# Patient Record
Sex: FEMALE | Race: BLACK OR AFRICAN AMERICAN | Employment: OTHER | URBAN - METROPOLITAN AREA
[De-identification: names, ages, dates, MRNs, and addresses within clinical notes are randomized per-mention and may not be internally consistent; named-entity substitution may affect disease eponyms.]

---

## 2017-03-30 ENCOUNTER — HOSPITAL ENCOUNTER (OUTPATIENT)
Age: 62
Setting detail: OBSERVATION
Discharge: HOME HEALTH | DRG: 202 | End: 2017-04-04
Attending: FAMILY MEDICINE | Admitting: FAMILY MEDICINE
Payer: COMMERCIAL

## 2017-03-30 ENCOUNTER — APPOINTMENT (OUTPATIENT)
Dept: GENERAL RADIOLOGY | Age: 62
DRG: 202 | End: 2017-03-30
Attending: FAMILY MEDICINE
Payer: COMMERCIAL

## 2017-03-30 DIAGNOSIS — J45.901 ASTHMATIC BRONCHITIS WITH ACUTE EXACERBATION: Primary | ICD-10-CM

## 2017-03-30 LAB
ABSOLUTE EOS #: 0.1 K/UL (ref 0–0.4)
ABSOLUTE LYMPH #: 1.5 K/UL (ref 1–4.8)
ABSOLUTE MONO #: 0.5 K/UL (ref 0.1–1.3)
ANION GAP SERPL CALCULATED.3IONS-SCNC: 13 MMOL/L (ref 9–17)
BASOPHILS # BLD: 0 % (ref 0–2)
BASOPHILS ABSOLUTE: 0 K/UL (ref 0–0.2)
BUN BLDV-MCNC: 15 MG/DL (ref 8–23)
BUN/CREAT BLD: ABNORMAL (ref 9–20)
CALCIUM SERPL-MCNC: 8.4 MG/DL (ref 8.6–10.4)
CHLORIDE BLD-SCNC: 102 MMOL/L (ref 98–107)
CO2: 24 MMOL/L (ref 20–31)
CREAT SERPL-MCNC: 0.95 MG/DL (ref 0.5–0.9)
DIFFERENTIAL TYPE: ABNORMAL
EOSINOPHILS RELATIVE PERCENT: 2 % (ref 0–4)
GFR AFRICAN AMERICAN: >60 ML/MIN
GFR NON-AFRICAN AMERICAN: 59 ML/MIN
GFR SERPL CREATININE-BSD FRML MDRD: ABNORMAL ML/MIN/{1.73_M2}
GFR SERPL CREATININE-BSD FRML MDRD: ABNORMAL ML/MIN/{1.73_M2}
GLUCOSE BLD-MCNC: 116 MG/DL (ref 70–99)
HCT VFR BLD CALC: 41 % (ref 36–46)
HEMOGLOBIN: 13 G/DL (ref 12–16)
LYMPHOCYTES # BLD: 28 % (ref 24–44)
MCH RBC QN AUTO: 28.9 PG (ref 26–34)
MCHC RBC AUTO-ENTMCNC: 31.8 G/DL (ref 31–37)
MCV RBC AUTO: 90.7 FL (ref 80–100)
MONOCYTES # BLD: 9 % (ref 1–7)
PDW BLD-RTO: 14.4 % (ref 11.5–14.9)
PLATELET # BLD: 199 K/UL (ref 150–450)
PLATELET ESTIMATE: ABNORMAL
PMV BLD AUTO: 8.1 FL (ref 6–12)
POTASSIUM SERPL-SCNC: 4.6 MMOL/L (ref 3.7–5.3)
RBC # BLD: 4.52 M/UL (ref 4–5.2)
RBC # BLD: ABNORMAL 10*6/UL
SEG NEUTROPHILS: 61 % (ref 36–66)
SEGMENTED NEUTROPHILS ABSOLUTE COUNT: 3.3 K/UL (ref 1.3–9.1)
SODIUM BLD-SCNC: 139 MMOL/L (ref 135–144)
WBC # BLD: 5.3 K/UL (ref 3.5–11)
WBC # BLD: ABNORMAL 10*3/UL

## 2017-03-30 PROCEDURE — 71020 XR CHEST STANDARD TWO VW: CPT

## 2017-03-30 PROCEDURE — 6360000002 HC RX W HCPCS: Performed by: FAMILY MEDICINE

## 2017-03-30 PROCEDURE — 80048 BASIC METABOLIC PNL TOTAL CA: CPT

## 2017-03-30 PROCEDURE — 36415 COLL VENOUS BLD VENIPUNCTURE: CPT

## 2017-03-30 PROCEDURE — 96365 THER/PROPH/DIAG IV INF INIT: CPT

## 2017-03-30 PROCEDURE — 96375 TX/PRO/DX INJ NEW DRUG ADDON: CPT

## 2017-03-30 PROCEDURE — 2580000003 HC RX 258: Performed by: FAMILY MEDICINE

## 2017-03-30 PROCEDURE — 94640 AIRWAY INHALATION TREATMENT: CPT

## 2017-03-30 PROCEDURE — 87040 BLOOD CULTURE FOR BACTERIA: CPT

## 2017-03-30 PROCEDURE — 1200000000 HC SEMI PRIVATE

## 2017-03-30 PROCEDURE — G0378 HOSPITAL OBSERVATION PER HR: HCPCS

## 2017-03-30 PROCEDURE — G0379 DIRECT REFER HOSPITAL OBSERV: HCPCS

## 2017-03-30 PROCEDURE — 85025 COMPLETE CBC W/AUTO DIFF WBC: CPT

## 2017-03-30 RX ORDER — ALBUTEROL SULFATE 2.5 MG/3ML
2.5 SOLUTION RESPIRATORY (INHALATION) 4 TIMES DAILY
Status: DISCONTINUED | OUTPATIENT
Start: 2017-03-30 | End: 2017-04-04 | Stop reason: HOSPADM

## 2017-03-30 RX ORDER — FUROSEMIDE 40 MG/1
40 TABLET ORAL DAILY
Status: DISCONTINUED | OUTPATIENT
Start: 2017-03-31 | End: 2017-04-04 | Stop reason: HOSPADM

## 2017-03-30 RX ORDER — FUROSEMIDE 20 MG/1
40 TABLET ORAL DAILY
COMMUNITY
End: 2019-09-04 | Stop reason: DRUGHIGH

## 2017-03-30 RX ORDER — METHYLPREDNISOLONE SODIUM SUCCINATE 125 MG/2ML
60 INJECTION, POWDER, LYOPHILIZED, FOR SOLUTION INTRAMUSCULAR; INTRAVENOUS EVERY 6 HOURS
Status: DISCONTINUED | OUTPATIENT
Start: 2017-03-30 | End: 2017-04-02

## 2017-03-30 RX ORDER — ALBUTEROL SULFATE 2.5 MG/3ML
2.5 SOLUTION RESPIRATORY (INHALATION) EVERY 6 HOURS PRN
Status: DISCONTINUED | OUTPATIENT
Start: 2017-03-30 | End: 2017-04-04 | Stop reason: HOSPADM

## 2017-03-30 RX ORDER — SERTRALINE HYDROCHLORIDE 25 MG/1
25 TABLET, FILM COATED ORAL NIGHTLY
COMMUNITY
End: 2019-09-04 | Stop reason: DRUGHIGH

## 2017-03-30 RX ORDER — FERROUS SULFATE 325(65) MG
325 TABLET ORAL
COMMUNITY

## 2017-03-30 RX ADMIN — ALBUTEROL SULFATE 2.5 MG: 2.5 SOLUTION RESPIRATORY (INHALATION) at 20:28

## 2017-03-30 RX ADMIN — METHYLPREDNISOLONE SODIUM SUCCINATE 60 MG: 125 INJECTION, POWDER, FOR SOLUTION INTRAMUSCULAR; INTRAVENOUS at 21:40

## 2017-03-30 RX ADMIN — CEFTRIAXONE SODIUM 1 G: 1 INJECTION, POWDER, FOR SOLUTION INTRAMUSCULAR; INTRAVENOUS at 21:40

## 2017-03-31 PROBLEM — M17.0 PRIMARY OSTEOARTHRITIS OF BOTH KNEES: Status: ACTIVE | Noted: 2017-03-31

## 2017-03-31 PROBLEM — J45.901 ASTHMATIC BRONCHITIS WITH ACUTE EXACERBATION: Status: ACTIVE | Noted: 2017-03-31

## 2017-03-31 PROCEDURE — 96366 THER/PROPH/DIAG IV INF ADDON: CPT

## 2017-03-31 PROCEDURE — 2580000003 HC RX 258: Performed by: FAMILY MEDICINE

## 2017-03-31 PROCEDURE — 96376 TX/PRO/DX INJ SAME DRUG ADON: CPT

## 2017-03-31 PROCEDURE — 6370000000 HC RX 637 (ALT 250 FOR IP): Performed by: FAMILY MEDICINE

## 2017-03-31 PROCEDURE — G0378 HOSPITAL OBSERVATION PER HR: HCPCS

## 2017-03-31 PROCEDURE — 94761 N-INVAS EAR/PLS OXIMETRY MLT: CPT

## 2017-03-31 PROCEDURE — 6360000002 HC RX W HCPCS: Performed by: FAMILY MEDICINE

## 2017-03-31 PROCEDURE — 1200000000 HC SEMI PRIVATE

## 2017-03-31 PROCEDURE — 94640 AIRWAY INHALATION TREATMENT: CPT

## 2017-03-31 RX ORDER — DICLOFENAC SODIUM 75 MG/1
75 TABLET, DELAYED RELEASE ORAL 2 TIMES DAILY
Status: DISCONTINUED | OUTPATIENT
Start: 2017-03-31 | End: 2017-03-31

## 2017-03-31 RX ORDER — AZITHROMYCIN 250 MG/1
500 TABLET, FILM COATED ORAL ONCE
Status: COMPLETED | OUTPATIENT
Start: 2017-03-31 | End: 2017-03-31

## 2017-03-31 RX ORDER — AZITHROMYCIN 250 MG/1
250 TABLET, FILM COATED ORAL DAILY
Status: COMPLETED | OUTPATIENT
Start: 2017-04-01 | End: 2017-04-04

## 2017-03-31 RX ORDER — MISOPROSTOL 200 UG/1
200 TABLET ORAL DAILY
Status: DISCONTINUED | OUTPATIENT
Start: 2017-03-31 | End: 2017-04-04 | Stop reason: HOSPADM

## 2017-03-31 RX ORDER — BENZONATATE 100 MG/1
200 CAPSULE ORAL 3 TIMES DAILY PRN
Status: DISCONTINUED | OUTPATIENT
Start: 2017-03-31 | End: 2017-04-04 | Stop reason: HOSPADM

## 2017-03-31 RX ORDER — IBUPROFEN 600 MG/1
600 TABLET ORAL
Status: DISCONTINUED | OUTPATIENT
Start: 2017-03-31 | End: 2017-04-04 | Stop reason: HOSPADM

## 2017-03-31 RX ADMIN — ALBUTEROL SULFATE 2.5 MG: 2.5 SOLUTION RESPIRATORY (INHALATION) at 15:30

## 2017-03-31 RX ADMIN — ALBUTEROL SULFATE 2.5 MG: 2.5 SOLUTION RESPIRATORY (INHALATION) at 11:48

## 2017-03-31 RX ADMIN — IBUPROFEN 600 MG: 600 TABLET, FILM COATED ORAL at 10:03

## 2017-03-31 RX ADMIN — METHYLPREDNISOLONE SODIUM SUCCINATE 60 MG: 125 INJECTION, POWDER, FOR SOLUTION INTRAMUSCULAR; INTRAVENOUS at 10:03

## 2017-03-31 RX ADMIN — ALBUTEROL SULFATE 2.5 MG: 2.5 SOLUTION RESPIRATORY (INHALATION) at 07:42

## 2017-03-31 RX ADMIN — BENZONATATE 200 MG: 100 CAPSULE ORAL at 19:58

## 2017-03-31 RX ADMIN — BENZONATATE 200 MG: 100 CAPSULE ORAL at 10:03

## 2017-03-31 RX ADMIN — MISOPROSTOL 200 MCG: 200 TABLET ORAL at 10:03

## 2017-03-31 RX ADMIN — SERTRALINE HYDROCHLORIDE 25 MG: 50 TABLET ORAL at 19:58

## 2017-03-31 RX ADMIN — METHYLPREDNISOLONE SODIUM SUCCINATE 60 MG: 125 INJECTION, POWDER, FOR SOLUTION INTRAMUSCULAR; INTRAVENOUS at 16:27

## 2017-03-31 RX ADMIN — AZITHROMYCIN 500 MG: 250 TABLET, FILM COATED ORAL at 10:03

## 2017-03-31 RX ADMIN — METHYLPREDNISOLONE SODIUM SUCCINATE 60 MG: 125 INJECTION, POWDER, FOR SOLUTION INTRAMUSCULAR; INTRAVENOUS at 04:13

## 2017-03-31 RX ADMIN — IBUPROFEN 600 MG: 600 TABLET, FILM COATED ORAL at 16:28

## 2017-03-31 RX ADMIN — METHYLPREDNISOLONE SODIUM SUCCINATE 60 MG: 125 INJECTION, POWDER, FOR SOLUTION INTRAMUSCULAR; INTRAVENOUS at 22:57

## 2017-03-31 RX ADMIN — CEFTRIAXONE SODIUM 1 G: 1 INJECTION, POWDER, FOR SOLUTION INTRAMUSCULAR; INTRAVENOUS at 17:11

## 2017-03-31 RX ADMIN — SERTRALINE HYDROCHLORIDE 25 MG: 50 TABLET ORAL at 00:02

## 2017-03-31 RX ADMIN — FUROSEMIDE 40 MG: 40 TABLET ORAL at 10:03

## 2017-03-31 RX ADMIN — ALBUTEROL SULFATE 2.5 MG: 2.5 SOLUTION RESPIRATORY (INHALATION) at 19:22

## 2017-03-31 ASSESSMENT — PAIN SCALES - GENERAL
PAINLEVEL_OUTOF10: 0
PAINLEVEL_OUTOF10: 0

## 2017-04-01 PROBLEM — G44.83 PRIMARY COUGH HEADACHE: Status: ACTIVE | Noted: 2017-04-01

## 2017-04-01 LAB — GLUCOSE BLD-MCNC: 156 MG/DL (ref 65–105)

## 2017-04-01 PROCEDURE — 94760 N-INVAS EAR/PLS OXIMETRY 1: CPT

## 2017-04-01 PROCEDURE — G0378 HOSPITAL OBSERVATION PER HR: HCPCS

## 2017-04-01 PROCEDURE — 82947 ASSAY GLUCOSE BLOOD QUANT: CPT

## 2017-04-01 PROCEDURE — 2580000003 HC RX 258: Performed by: FAMILY MEDICINE

## 2017-04-01 PROCEDURE — 6360000002 HC RX W HCPCS: Performed by: FAMILY MEDICINE

## 2017-04-01 PROCEDURE — 1200000000 HC SEMI PRIVATE

## 2017-04-01 PROCEDURE — 94640 AIRWAY INHALATION TREATMENT: CPT

## 2017-04-01 PROCEDURE — 96376 TX/PRO/DX INJ SAME DRUG ADON: CPT

## 2017-04-01 PROCEDURE — 96366 THER/PROPH/DIAG IV INF ADDON: CPT

## 2017-04-01 PROCEDURE — 6370000000 HC RX 637 (ALT 250 FOR IP): Performed by: FAMILY MEDICINE

## 2017-04-01 PROCEDURE — 94761 N-INVAS EAR/PLS OXIMETRY MLT: CPT

## 2017-04-01 RX ORDER — ACETAMINOPHEN 325 MG/1
325 TABLET ORAL EVERY 4 HOURS PRN
Status: DISCONTINUED | OUTPATIENT
Start: 2017-04-01 | End: 2017-04-04 | Stop reason: HOSPADM

## 2017-04-01 RX ORDER — ACETAMINOPHEN 325 MG/1
325 TABLET ORAL EVERY 6 HOURS PRN
Status: DISCONTINUED | OUTPATIENT
Start: 2017-04-01 | End: 2017-04-01

## 2017-04-01 RX ORDER — HYDROCODONE BITARTRATE AND ACETAMINOPHEN 5; 325 MG/1; MG/1
1 TABLET ORAL EVERY 4 HOURS PRN
Status: DISCONTINUED | OUTPATIENT
Start: 2017-04-01 | End: 2017-04-04 | Stop reason: HOSPADM

## 2017-04-01 RX ORDER — ACETAMINOPHEN 325 MG/1
650 TABLET ORAL EVERY 6 HOURS PRN
Status: DISCONTINUED | OUTPATIENT
Start: 2017-04-01 | End: 2017-04-01

## 2017-04-01 RX ADMIN — METHYLPREDNISOLONE SODIUM SUCCINATE 60 MG: 125 INJECTION, POWDER, FOR SOLUTION INTRAMUSCULAR; INTRAVENOUS at 04:59

## 2017-04-01 RX ADMIN — IBUPROFEN 600 MG: 600 TABLET, FILM COATED ORAL at 10:30

## 2017-04-01 RX ADMIN — MISOPROSTOL 200 MCG: 200 TABLET ORAL at 10:31

## 2017-04-01 RX ADMIN — IBUPROFEN 600 MG: 600 TABLET, FILM COATED ORAL at 18:43

## 2017-04-01 RX ADMIN — ACETAMINOPHEN 650 MG: 325 TABLET ORAL at 02:27

## 2017-04-01 RX ADMIN — ALBUTEROL SULFATE 2.5 MG: 2.5 SOLUTION RESPIRATORY (INHALATION) at 20:54

## 2017-04-01 RX ADMIN — IBUPROFEN 600 MG: 600 TABLET, FILM COATED ORAL at 14:46

## 2017-04-01 RX ADMIN — ALBUTEROL SULFATE 2.5 MG: 2.5 SOLUTION RESPIRATORY (INHALATION) at 02:41

## 2017-04-01 RX ADMIN — METHYLPREDNISOLONE SODIUM SUCCINATE 60 MG: 125 INJECTION, POWDER, FOR SOLUTION INTRAMUSCULAR; INTRAVENOUS at 21:13

## 2017-04-01 RX ADMIN — CEFTRIAXONE SODIUM 1 G: 1 INJECTION, POWDER, FOR SOLUTION INTRAMUSCULAR; INTRAVENOUS at 19:35

## 2017-04-01 RX ADMIN — AZITHROMYCIN 250 MG: 250 TABLET, FILM COATED ORAL at 10:30

## 2017-04-01 RX ADMIN — METHYLPREDNISOLONE SODIUM SUCCINATE 60 MG: 125 INJECTION, POWDER, FOR SOLUTION INTRAMUSCULAR; INTRAVENOUS at 10:30

## 2017-04-01 RX ADMIN — ALBUTEROL SULFATE 2.5 MG: 2.5 SOLUTION RESPIRATORY (INHALATION) at 07:41

## 2017-04-01 RX ADMIN — METHYLPREDNISOLONE SODIUM SUCCINATE 60 MG: 125 INJECTION, POWDER, FOR SOLUTION INTRAMUSCULAR; INTRAVENOUS at 18:55

## 2017-04-01 RX ADMIN — ALBUTEROL SULFATE 2.5 MG: 2.5 SOLUTION RESPIRATORY (INHALATION) at 11:42

## 2017-04-01 RX ADMIN — FUROSEMIDE 40 MG: 40 TABLET ORAL at 10:31

## 2017-04-01 RX ADMIN — BENZONATATE 200 MG: 100 CAPSULE ORAL at 10:31

## 2017-04-01 RX ADMIN — SERTRALINE HYDROCHLORIDE 25 MG: 50 TABLET ORAL at 21:13

## 2017-04-01 RX ADMIN — ALBUTEROL SULFATE 2.5 MG: 2.5 SOLUTION RESPIRATORY (INHALATION) at 16:01

## 2017-04-01 ASSESSMENT — PAIN SCALES - GENERAL
PAINLEVEL_OUTOF10: 5
PAINLEVEL_OUTOF10: 0
PAINLEVEL_OUTOF10: 5
PAINLEVEL_OUTOF10: 0
PAINLEVEL_OUTOF10: 6
PAINLEVEL_OUTOF10: 6

## 2017-04-02 PROCEDURE — 94761 N-INVAS EAR/PLS OXIMETRY MLT: CPT

## 2017-04-02 PROCEDURE — 96366 THER/PROPH/DIAG IV INF ADDON: CPT

## 2017-04-02 PROCEDURE — 6360000002 HC RX W HCPCS: Performed by: FAMILY MEDICINE

## 2017-04-02 PROCEDURE — 96376 TX/PRO/DX INJ SAME DRUG ADON: CPT

## 2017-04-02 PROCEDURE — 6370000000 HC RX 637 (ALT 250 FOR IP): Performed by: FAMILY MEDICINE

## 2017-04-02 PROCEDURE — 1200000000 HC SEMI PRIVATE

## 2017-04-02 PROCEDURE — G0378 HOSPITAL OBSERVATION PER HR: HCPCS

## 2017-04-02 PROCEDURE — 94664 DEMO&/EVAL PT USE INHALER: CPT

## 2017-04-02 PROCEDURE — 2580000003 HC RX 258: Performed by: FAMILY MEDICINE

## 2017-04-02 PROCEDURE — 94640 AIRWAY INHALATION TREATMENT: CPT

## 2017-04-02 RX ORDER — METHYLPREDNISOLONE SODIUM SUCCINATE 40 MG/ML
40 INJECTION, POWDER, LYOPHILIZED, FOR SOLUTION INTRAMUSCULAR; INTRAVENOUS EVERY 8 HOURS
Status: DISCONTINUED | OUTPATIENT
Start: 2017-04-02 | End: 2017-04-03

## 2017-04-02 RX ORDER — METHYLPREDNISOLONE SODIUM SUCCINATE 125 MG/2ML
INJECTION, POWDER, LYOPHILIZED, FOR SOLUTION INTRAMUSCULAR; INTRAVENOUS
Status: DISPENSED
Start: 2017-04-02 | End: 2017-04-02

## 2017-04-02 RX ADMIN — METHYLPREDNISOLONE SODIUM SUCCINATE 60 MG: 125 INJECTION, POWDER, FOR SOLUTION INTRAMUSCULAR; INTRAVENOUS at 04:13

## 2017-04-02 RX ADMIN — IBUPROFEN 600 MG: 600 TABLET, FILM COATED ORAL at 17:46

## 2017-04-02 RX ADMIN — ALBUTEROL SULFATE 2.5 MG: 2.5 SOLUTION RESPIRATORY (INHALATION) at 19:14

## 2017-04-02 RX ADMIN — IBUPROFEN 600 MG: 600 TABLET, FILM COATED ORAL at 09:24

## 2017-04-02 RX ADMIN — METHYLPREDNISOLONE SODIUM SUCCINATE 40 MG: 40 INJECTION, POWDER, FOR SOLUTION INTRAMUSCULAR; INTRAVENOUS at 17:47

## 2017-04-02 RX ADMIN — ALBUTEROL SULFATE 2.5 MG: 2.5 SOLUTION RESPIRATORY (INHALATION) at 15:41

## 2017-04-02 RX ADMIN — FUROSEMIDE 40 MG: 40 TABLET ORAL at 09:24

## 2017-04-02 RX ADMIN — SERTRALINE HYDROCHLORIDE 25 MG: 50 TABLET ORAL at 20:35

## 2017-04-02 RX ADMIN — IBUPROFEN 600 MG: 600 TABLET, FILM COATED ORAL at 11:33

## 2017-04-02 RX ADMIN — MISOPROSTOL 200 MCG: 200 TABLET ORAL at 09:26

## 2017-04-02 RX ADMIN — ALBUTEROL SULFATE 2.5 MG: 2.5 SOLUTION RESPIRATORY (INHALATION) at 11:07

## 2017-04-02 RX ADMIN — BECLOMETHASONE DIPROPIONATE 2 PUFF: 80 AEROSOL, METERED RESPIRATORY (INHALATION) at 11:33

## 2017-04-02 RX ADMIN — BECLOMETHASONE DIPROPIONATE 2 PUFF: 80 AEROSOL, METERED RESPIRATORY (INHALATION) at 20:35

## 2017-04-02 RX ADMIN — METHYLPREDNISOLONE SODIUM SUCCINATE 60 MG: 125 INJECTION, POWDER, FOR SOLUTION INTRAMUSCULAR; INTRAVENOUS at 09:29

## 2017-04-02 RX ADMIN — AZITHROMYCIN 250 MG: 250 TABLET, FILM COATED ORAL at 09:24

## 2017-04-02 RX ADMIN — ALBUTEROL SULFATE 2.5 MG: 2.5 SOLUTION RESPIRATORY (INHALATION) at 07:26

## 2017-04-02 RX ADMIN — CEFTRIAXONE SODIUM 1 G: 1 INJECTION, POWDER, FOR SOLUTION INTRAMUSCULAR; INTRAVENOUS at 17:45

## 2017-04-02 ASSESSMENT — PAIN DESCRIPTION - PAIN TYPE
TYPE: CHRONIC PAIN

## 2017-04-02 ASSESSMENT — PAIN SCALES - GENERAL
PAINLEVEL_OUTOF10: 3
PAINLEVEL_OUTOF10: 5
PAINLEVEL_OUTOF10: 5
PAINLEVEL_OUTOF10: 2
PAINLEVEL_OUTOF10: 5
PAINLEVEL_OUTOF10: 3

## 2017-04-02 ASSESSMENT — PAIN DESCRIPTION - LOCATION
LOCATION: GENERALIZED

## 2017-04-03 LAB
LV EF: 65 %
LVEF MODALITY: NORMAL

## 2017-04-03 PROCEDURE — 94762 N-INVAS EAR/PLS OXIMTRY CONT: CPT

## 2017-04-03 PROCEDURE — 94760 N-INVAS EAR/PLS OXIMETRY 1: CPT

## 2017-04-03 PROCEDURE — 6360000004 HC RX CONTRAST MEDICATION: Performed by: INTERNAL MEDICINE

## 2017-04-03 PROCEDURE — 6360000002 HC RX W HCPCS: Performed by: FAMILY MEDICINE

## 2017-04-03 PROCEDURE — 6360000002 HC RX W HCPCS: Performed by: INTERNAL MEDICINE

## 2017-04-03 PROCEDURE — 94640 AIRWAY INHALATION TREATMENT: CPT

## 2017-04-03 PROCEDURE — 93306 TTE W/DOPPLER COMPLETE: CPT

## 2017-04-03 PROCEDURE — 2580000003 HC RX 258: Performed by: FAMILY MEDICINE

## 2017-04-03 PROCEDURE — 1200000000 HC SEMI PRIVATE

## 2017-04-03 PROCEDURE — 6370000000 HC RX 637 (ALT 250 FOR IP): Performed by: FAMILY MEDICINE

## 2017-04-03 PROCEDURE — G0378 HOSPITAL OBSERVATION PER HR: HCPCS

## 2017-04-03 PROCEDURE — 94761 N-INVAS EAR/PLS OXIMETRY MLT: CPT

## 2017-04-03 PROCEDURE — 96366 THER/PROPH/DIAG IV INF ADDON: CPT

## 2017-04-03 PROCEDURE — 96376 TX/PRO/DX INJ SAME DRUG ADON: CPT

## 2017-04-03 RX ORDER — METHYLPREDNISOLONE SODIUM SUCCINATE 40 MG/ML
30 INJECTION, POWDER, LYOPHILIZED, FOR SOLUTION INTRAMUSCULAR; INTRAVENOUS EVERY 8 HOURS
Status: DISCONTINUED | OUTPATIENT
Start: 2017-04-03 | End: 2017-04-04

## 2017-04-03 RX ADMIN — ALBUTEROL SULFATE 2.5 MG: 2.5 SOLUTION RESPIRATORY (INHALATION) at 01:37

## 2017-04-03 RX ADMIN — METHYLPREDNISOLONE SODIUM SUCCINATE 40 MG: 40 INJECTION, POWDER, FOR SOLUTION INTRAMUSCULAR; INTRAVENOUS at 08:08

## 2017-04-03 RX ADMIN — FUROSEMIDE 40 MG: 40 TABLET ORAL at 08:04

## 2017-04-03 RX ADMIN — ALBUTEROL SULFATE 2.5 MG: 2.5 SOLUTION RESPIRATORY (INHALATION) at 12:05

## 2017-04-03 RX ADMIN — SERTRALINE HYDROCHLORIDE 25 MG: 50 TABLET ORAL at 22:46

## 2017-04-03 RX ADMIN — IBUPROFEN 600 MG: 600 TABLET, FILM COATED ORAL at 08:05

## 2017-04-03 RX ADMIN — BECLOMETHASONE DIPROPIONATE 2 PUFF: 80 AEROSOL, METERED RESPIRATORY (INHALATION) at 08:05

## 2017-04-03 RX ADMIN — MISOPROSTOL 200 MCG: 200 TABLET ORAL at 08:08

## 2017-04-03 RX ADMIN — METHYLPREDNISOLONE SODIUM SUCCINATE 30 MG: 40 INJECTION, POWDER, FOR SOLUTION INTRAMUSCULAR; INTRAVENOUS at 18:19

## 2017-04-03 RX ADMIN — BECLOMETHASONE DIPROPIONATE 2 PUFF: 80 AEROSOL, METERED RESPIRATORY (INHALATION) at 22:54

## 2017-04-03 RX ADMIN — CEFTRIAXONE SODIUM 1 G: 1 INJECTION, POWDER, FOR SOLUTION INTRAMUSCULAR; INTRAVENOUS at 18:19

## 2017-04-03 RX ADMIN — IBUPROFEN 600 MG: 600 TABLET, FILM COATED ORAL at 17:34

## 2017-04-03 RX ADMIN — ALBUTEROL SULFATE 2.5 MG: 2.5 SOLUTION RESPIRATORY (INHALATION) at 15:14

## 2017-04-03 RX ADMIN — ALBUTEROL SULFATE 2.5 MG: 2.5 SOLUTION RESPIRATORY (INHALATION) at 20:31

## 2017-04-03 RX ADMIN — ALBUTEROL SULFATE 2.5 MG: 2.5 SOLUTION RESPIRATORY (INHALATION) at 08:25

## 2017-04-03 RX ADMIN — AZITHROMYCIN 250 MG: 250 TABLET, FILM COATED ORAL at 08:05

## 2017-04-03 RX ADMIN — PERFLUTREN 2.2 MG: 6.52 INJECTION, SUSPENSION INTRAVENOUS at 13:41

## 2017-04-03 RX ADMIN — IBUPROFEN 600 MG: 600 TABLET, FILM COATED ORAL at 13:46

## 2017-04-03 RX ADMIN — METHYLPREDNISOLONE SODIUM SUCCINATE 40 MG: 40 INJECTION, POWDER, FOR SOLUTION INTRAMUSCULAR; INTRAVENOUS at 01:21

## 2017-04-03 ASSESSMENT — PAIN SCALES - GENERAL
PAINLEVEL_OUTOF10: 0
PAINLEVEL_OUTOF10: 5
PAINLEVEL_OUTOF10: 5
PAINLEVEL_OUTOF10: 3

## 2017-04-04 VITALS
TEMPERATURE: 97.2 F | WEIGHT: 293 LBS | RESPIRATION RATE: 16 BRPM | HEART RATE: 81 BPM | BODY MASS INDEX: 47.09 KG/M2 | HEIGHT: 66 IN | SYSTOLIC BLOOD PRESSURE: 129 MMHG | OXYGEN SATURATION: 96 % | DIASTOLIC BLOOD PRESSURE: 69 MMHG

## 2017-04-04 PROCEDURE — 6360000002 HC RX W HCPCS: Performed by: FAMILY MEDICINE

## 2017-04-04 PROCEDURE — 94761 N-INVAS EAR/PLS OXIMETRY MLT: CPT

## 2017-04-04 PROCEDURE — 94762 N-INVAS EAR/PLS OXIMTRY CONT: CPT

## 2017-04-04 PROCEDURE — G0378 HOSPITAL OBSERVATION PER HR: HCPCS

## 2017-04-04 PROCEDURE — 94640 AIRWAY INHALATION TREATMENT: CPT

## 2017-04-04 PROCEDURE — 6370000000 HC RX 637 (ALT 250 FOR IP): Performed by: FAMILY MEDICINE

## 2017-04-04 PROCEDURE — 6360000002 HC RX W HCPCS: Performed by: INTERNAL MEDICINE

## 2017-04-04 PROCEDURE — 96376 TX/PRO/DX INJ SAME DRUG ADON: CPT

## 2017-04-04 RX ORDER — CEFUROXIME AXETIL 250 MG/1
500 TABLET ORAL EVERY 12 HOURS SCHEDULED
Status: DISCONTINUED | OUTPATIENT
Start: 2017-04-04 | End: 2017-04-04 | Stop reason: HOSPADM

## 2017-04-04 RX ORDER — AMLODIPINE BESYLATE AND BENAZEPRIL HYDROCHLORIDE 10; 20 MG/1; MG/1
1 CAPSULE ORAL DAILY
Qty: 30 CAPSULE | Refills: 3 | Status: SHIPPED | OUTPATIENT
Start: 2017-04-04 | End: 2017-05-01

## 2017-04-04 RX ORDER — BENZONATATE 200 MG/1
200 CAPSULE ORAL 3 TIMES DAILY PRN
Qty: 30 CAPSULE | Refills: 0 | Status: SHIPPED | OUTPATIENT
Start: 2017-04-04 | End: 2017-04-11

## 2017-04-04 RX ORDER — ACETAMINOPHEN 325 MG/1
325 TABLET ORAL EVERY 4 HOURS PRN
Qty: 120 TABLET | Refills: 3 | Status: SHIPPED | OUTPATIENT
Start: 2017-04-04 | End: 2017-05-01

## 2017-04-04 RX ORDER — ALBUTEROL SULFATE 2.5 MG/3ML
2.5 SOLUTION RESPIRATORY (INHALATION) 4 TIMES DAILY
Qty: 120 EACH | Refills: 3 | Status: ON HOLD | OUTPATIENT
Start: 2017-04-04 | End: 2019-05-25 | Stop reason: HOSPADM

## 2017-04-04 RX ORDER — PREDNISONE 1 MG/1
15 TABLET ORAL DAILY
Qty: 9 TABLET | Refills: 0 | Status: SHIPPED | OUTPATIENT
Start: 2017-04-07 | End: 2017-04-10

## 2017-04-04 RX ORDER — PREDNISONE 20 MG/1
20 TABLET ORAL DAILY
Qty: 2 TABLET | Refills: 0 | Status: SHIPPED | OUTPATIENT
Start: 2017-04-04 | End: 2017-04-06

## 2017-04-04 RX ORDER — PREDNISONE 10 MG/1
10 TABLET ORAL DAILY
Qty: 7 TABLET | Refills: 0 | Status: SHIPPED | OUTPATIENT
Start: 2017-04-11 | End: 2017-04-18

## 2017-04-04 RX ORDER — PREDNISONE 10 MG/1
10 TABLET ORAL DAILY
Status: DISCONTINUED | OUTPATIENT
Start: 2017-04-10 | End: 2017-04-04 | Stop reason: HOSPADM

## 2017-04-04 RX ORDER — PREDNISONE 10 MG/1
5 TABLET ORAL DAILY
Status: DISCONTINUED | OUTPATIENT
Start: 2017-04-13 | End: 2017-04-04 | Stop reason: HOSPADM

## 2017-04-04 RX ORDER — PREDNISONE 10 MG/1
15 TABLET ORAL DAILY
Status: DISCONTINUED | OUTPATIENT
Start: 2017-04-07 | End: 2017-04-04 | Stop reason: HOSPADM

## 2017-04-04 RX ORDER — PREDNISONE 20 MG/1
20 TABLET ORAL DAILY
Status: DISCONTINUED | OUTPATIENT
Start: 2017-04-04 | End: 2017-04-04 | Stop reason: HOSPADM

## 2017-04-04 RX ORDER — CEFUROXIME AXETIL 500 MG/1
500 TABLET ORAL EVERY 12 HOURS SCHEDULED
Qty: 8 TABLET | Refills: 0 | Status: SHIPPED | OUTPATIENT
Start: 2017-04-04 | End: 2017-04-08

## 2017-04-04 RX ADMIN — FUROSEMIDE 40 MG: 40 TABLET ORAL at 08:44

## 2017-04-04 RX ADMIN — BENZONATATE 200 MG: 100 CAPSULE ORAL at 08:44

## 2017-04-04 RX ADMIN — AZITHROMYCIN 250 MG: 250 TABLET, FILM COATED ORAL at 08:44

## 2017-04-04 RX ADMIN — METHYLPREDNISOLONE SODIUM SUCCINATE 30 MG: 40 INJECTION, POWDER, FOR SOLUTION INTRAMUSCULAR; INTRAVENOUS at 08:44

## 2017-04-04 RX ADMIN — ALBUTEROL SULFATE 2.5 MG: 2.5 SOLUTION RESPIRATORY (INHALATION) at 07:34

## 2017-04-04 RX ADMIN — METHYLPREDNISOLONE SODIUM SUCCINATE 30 MG: 40 INJECTION, POWDER, FOR SOLUTION INTRAMUSCULAR; INTRAVENOUS at 01:43

## 2017-04-04 RX ADMIN — IBUPROFEN 600 MG: 600 TABLET, FILM COATED ORAL at 08:44

## 2017-04-04 RX ADMIN — ALBUTEROL SULFATE 2.5 MG: 2.5 SOLUTION RESPIRATORY (INHALATION) at 12:09

## 2017-04-04 RX ADMIN — ALBUTEROL SULFATE 2.5 MG: 2.5 SOLUTION RESPIRATORY (INHALATION) at 15:03

## 2017-04-04 RX ADMIN — BECLOMETHASONE DIPROPIONATE 2 PUFF: 80 AEROSOL, METERED RESPIRATORY (INHALATION) at 08:44

## 2017-04-04 ASSESSMENT — PAIN SCALES - GENERAL
PAINLEVEL_OUTOF10: 4
PAINLEVEL_OUTOF10: 0

## 2017-04-05 LAB
CULTURE: NORMAL
CULTURE: NORMAL
Lab: NORMAL
Lab: NORMAL
SPECIMEN DESCRIPTION: NORMAL
SPECIMEN DESCRIPTION: NORMAL
STATUS: NORMAL

## 2017-05-01 ENCOUNTER — APPOINTMENT (OUTPATIENT)
Dept: CT IMAGING | Age: 62
DRG: 389 | End: 2017-05-01
Payer: COMMERCIAL

## 2017-05-01 ENCOUNTER — APPOINTMENT (OUTPATIENT)
Dept: GENERAL RADIOLOGY | Age: 62
DRG: 394 | End: 2017-05-01
Payer: COMMERCIAL

## 2017-05-01 ENCOUNTER — HOSPITAL ENCOUNTER (EMERGENCY)
Age: 62
Discharge: ANOTHER HOSPITAL | DRG: 389 | End: 2017-05-01
Attending: EMERGENCY MEDICINE | Admitting: EMERGENCY MEDICINE
Payer: COMMERCIAL

## 2017-05-01 ENCOUNTER — HOSPITAL ENCOUNTER (INPATIENT)
Age: 62
LOS: 4 days | Discharge: HOME | DRG: 394 | End: 2017-05-05
Attending: EMERGENCY MEDICINE | Admitting: FAMILY MEDICINE
Payer: COMMERCIAL

## 2017-05-01 VITALS
HEIGHT: 65 IN | BODY MASS INDEX: 48.82 KG/M2 | DIASTOLIC BLOOD PRESSURE: 56 MMHG | SYSTOLIC BLOOD PRESSURE: 117 MMHG | TEMPERATURE: 98.2 F | OXYGEN SATURATION: 99 % | WEIGHT: 293 LBS | HEART RATE: 71 BPM | RESPIRATION RATE: 16 BRPM

## 2017-05-01 DIAGNOSIS — R11.0 NAUSEA: ICD-10-CM

## 2017-05-01 DIAGNOSIS — R10.9 ABDOMINAL PAIN, UNSPECIFIED LOCATION: ICD-10-CM

## 2017-05-01 DIAGNOSIS — K56.609 SBO (SMALL BOWEL OBSTRUCTION) (HCC): Primary | ICD-10-CM

## 2017-05-01 PROBLEM — E66.01 MORBID OBESITY DUE TO EXCESS CALORIES (HCC): Status: ACTIVE | Noted: 2017-05-01

## 2017-05-01 PROBLEM — K43.9 VENTRAL HERNIA: Status: ACTIVE | Noted: 2017-05-01

## 2017-05-01 LAB
ABSOLUTE EOS #: 0 K/UL (ref 0–0.4)
ABSOLUTE LYMPH #: 1 K/UL (ref 1–4.8)
ABSOLUTE MONO #: 0.4 K/UL (ref 0.1–1.3)
ALBUMIN SERPL-MCNC: 3.7 G/DL (ref 3.5–5.2)
ALBUMIN/GLOBULIN RATIO: ABNORMAL (ref 1–2.5)
ALP BLD-CCNC: 336 U/L (ref 35–104)
ALT SERPL-CCNC: 24 U/L (ref 5–33)
ANION GAP SERPL CALCULATED.3IONS-SCNC: 13 MMOL/L (ref 9–17)
AST SERPL-CCNC: 28 U/L
BASOPHILS # BLD: 1 %
BASOPHILS ABSOLUTE: 0 K/UL (ref 0–0.2)
BILIRUB SERPL-MCNC: 0.87 MG/DL (ref 0.3–1.2)
BILIRUBIN URINE: NEGATIVE
BUN BLDV-MCNC: 27 MG/DL (ref 8–23)
BUN/CREAT BLD: ABNORMAL (ref 9–20)
CALCIUM SERPL-MCNC: 8.2 MG/DL (ref 8.6–10.4)
CHLORIDE BLD-SCNC: 104 MMOL/L (ref 98–107)
CO2: 23 MMOL/L (ref 20–31)
COLOR: YELLOW
COMMENT UA: NORMAL
CREAT SERPL-MCNC: 1.01 MG/DL (ref 0.5–0.9)
DIFFERENTIAL TYPE: ABNORMAL
EOSINOPHILS RELATIVE PERCENT: 0 %
GFR AFRICAN AMERICAN: >60 ML/MIN
GFR NON-AFRICAN AMERICAN: 55 ML/MIN
GFR SERPL CREATININE-BSD FRML MDRD: ABNORMAL ML/MIN/{1.73_M2}
GFR SERPL CREATININE-BSD FRML MDRD: ABNORMAL ML/MIN/{1.73_M2}
GLUCOSE BLD-MCNC: 115 MG/DL (ref 70–99)
GLUCOSE URINE: NEGATIVE
HCT VFR BLD CALC: 39.6 % (ref 36–46)
HEMOGLOBIN: 13 G/DL (ref 12–16)
INR BLD: 0.9
KETONES, URINE: NEGATIVE
LACTIC ACID, WHOLE BLOOD: 0.7 MMOL/L (ref 0.7–2.1)
LEUKOCYTE ESTERASE, URINE: NEGATIVE
LIPASE: 17 U/L (ref 13–60)
LYMPHOCYTES # BLD: 14 %
MCH RBC QN AUTO: 29.6 PG (ref 26–34)
MCHC RBC AUTO-ENTMCNC: 32.8 G/DL (ref 31–37)
MCV RBC AUTO: 90.2 FL (ref 80–100)
MONOCYTES # BLD: 6 %
NITRITE, URINE: NEGATIVE
PDW BLD-RTO: 15.5 % (ref 11.5–14.9)
PH UA: 8 (ref 5–8)
PLATELET # BLD: 200 K/UL (ref 150–450)
PLATELET ESTIMATE: ABNORMAL
PMV BLD AUTO: 7.9 FL (ref 6–12)
POTASSIUM SERPL-SCNC: 4.2 MMOL/L (ref 3.7–5.3)
PROTEIN UA: NEGATIVE
PROTHROMBIN TIME: 10.2 SEC (ref 9.4–12.6)
RBC # BLD: 4.39 M/UL (ref 4–5.2)
RBC # BLD: ABNORMAL 10*6/UL
SEG NEUTROPHILS: 79 %
SEGMENTED NEUTROPHILS ABSOLUTE COUNT: 5.8 K/UL (ref 1.3–9.1)
SODIUM BLD-SCNC: 140 MMOL/L (ref 135–144)
SPECIFIC GRAVITY UA: 1.02 (ref 1–1.03)
TOTAL PROTEIN: 6.8 G/DL (ref 6.4–8.3)
TURBIDITY: CLEAR
URINE HGB: NEGATIVE
UROBILINOGEN, URINE: NORMAL
WBC # BLD: 7.3 K/UL (ref 3.5–11)
WBC # BLD: ABNORMAL 10*3/UL

## 2017-05-01 PROCEDURE — 83605 ASSAY OF LACTIC ACID: CPT

## 2017-05-01 PROCEDURE — 96375 TX/PRO/DX INJ NEW DRUG ADDON: CPT

## 2017-05-01 PROCEDURE — 81003 URINALYSIS AUTO W/O SCOPE: CPT

## 2017-05-01 PROCEDURE — 1200000000 HC SEMI PRIVATE

## 2017-05-01 PROCEDURE — 83690 ASSAY OF LIPASE: CPT

## 2017-05-01 PROCEDURE — 2580000003 HC RX 258: Performed by: EMERGENCY MEDICINE

## 2017-05-01 PROCEDURE — 96376 TX/PRO/DX INJ SAME DRUG ADON: CPT

## 2017-05-01 PROCEDURE — 99285 EMERGENCY DEPT VISIT HI MDM: CPT

## 2017-05-01 PROCEDURE — 74000 XR ABDOMEN LIMITED (KUB): CPT

## 2017-05-01 PROCEDURE — 36415 COLL VENOUS BLD VENIPUNCTURE: CPT

## 2017-05-01 PROCEDURE — 74176 CT ABD & PELVIS W/O CONTRAST: CPT

## 2017-05-01 PROCEDURE — 85025 COMPLETE CBC W/AUTO DIFF WBC: CPT

## 2017-05-01 PROCEDURE — 96374 THER/PROPH/DIAG INJ IV PUSH: CPT

## 2017-05-01 PROCEDURE — 2580000003 HC RX 258: Performed by: NURSE PRACTITIONER

## 2017-05-01 PROCEDURE — 80053 COMPREHEN METABOLIC PANEL: CPT

## 2017-05-01 PROCEDURE — 6360000002 HC RX W HCPCS: Performed by: EMERGENCY MEDICINE

## 2017-05-01 PROCEDURE — 85610 PROTHROMBIN TIME: CPT

## 2017-05-01 RX ORDER — SODIUM CHLORIDE 0.9 % (FLUSH) 0.9 %
10 SYRINGE (ML) INJECTION EVERY 12 HOURS SCHEDULED
Status: CANCELLED | OUTPATIENT
Start: 2017-05-01

## 2017-05-01 RX ORDER — LANOLIN ALCOHOL/MO/W.PET/CERES
325 CREAM (GRAM) TOPICAL
Status: DISCONTINUED | OUTPATIENT
Start: 2017-05-02 | End: 2017-05-05 | Stop reason: HOSPADM

## 2017-05-01 RX ORDER — ONDANSETRON 2 MG/ML
4 INJECTION INTRAMUSCULAR; INTRAVENOUS ONCE
Status: COMPLETED | OUTPATIENT
Start: 2017-05-01 | End: 2017-05-01

## 2017-05-01 RX ORDER — POTASSIUM CHLORIDE 7.45 MG/ML
10 INJECTION INTRAVENOUS PRN
Status: DISCONTINUED | OUTPATIENT
Start: 2017-05-01 | End: 2017-05-05 | Stop reason: HOSPADM

## 2017-05-01 RX ORDER — MISOPROSTOL 100 UG/1
200 TABLET ORAL DAILY
Status: DISCONTINUED | OUTPATIENT
Start: 2017-05-02 | End: 2017-05-05 | Stop reason: HOSPADM

## 2017-05-01 RX ORDER — ONDANSETRON 2 MG/ML
4 INJECTION INTRAMUSCULAR; INTRAVENOUS EVERY 6 HOURS PRN
Status: DISCONTINUED | OUTPATIENT
Start: 2017-05-01 | End: 2017-05-05 | Stop reason: HOSPADM

## 2017-05-01 RX ORDER — SODIUM CHLORIDE 0.9 % (FLUSH) 0.9 %
10 SYRINGE (ML) INJECTION EVERY 12 HOURS SCHEDULED
Status: DISCONTINUED | OUTPATIENT
Start: 2017-05-01 | End: 2017-05-05 | Stop reason: HOSPADM

## 2017-05-01 RX ORDER — POTASSIUM CHLORIDE 20 MEQ/1
40 TABLET, EXTENDED RELEASE ORAL PRN
Status: DISCONTINUED | OUTPATIENT
Start: 2017-05-01 | End: 2017-05-05 | Stop reason: HOSPADM

## 2017-05-01 RX ORDER — BISACODYL 10 MG
10 SUPPOSITORY, RECTAL RECTAL DAILY PRN
Status: DISCONTINUED | OUTPATIENT
Start: 2017-05-01 | End: 2017-05-05 | Stop reason: HOSPADM

## 2017-05-01 RX ORDER — ESOMEPRAZOLE MAGNESIUM 20 MG/1
20 FOR SUSPENSION ORAL DAILY
Status: ON HOLD | COMMUNITY
End: 2019-05-25 | Stop reason: HOSPADM

## 2017-05-01 RX ORDER — ACETAMINOPHEN 325 MG/1
650 TABLET ORAL EVERY 4 HOURS PRN
Status: DISCONTINUED | OUTPATIENT
Start: 2017-05-01 | End: 2017-05-05 | Stop reason: HOSPADM

## 2017-05-01 RX ORDER — SODIUM CHLORIDE 0.9 % (FLUSH) 0.9 %
10 SYRINGE (ML) INJECTION PRN
Status: CANCELLED | OUTPATIENT
Start: 2017-05-01

## 2017-05-01 RX ORDER — ESOMEPRAZOLE MAGNESIUM 20 MG/1
20 FOR SUSPENSION ORAL DAILY
Status: DISCONTINUED | OUTPATIENT
Start: 2017-05-02 | End: 2017-05-01 | Stop reason: CLARIF

## 2017-05-01 RX ORDER — DEXTROSE, SODIUM CHLORIDE, AND POTASSIUM CHLORIDE 5; .45; .15 G/100ML; G/100ML; G/100ML
INJECTION INTRAVENOUS CONTINUOUS
Status: CANCELLED | OUTPATIENT
Start: 2017-05-01

## 2017-05-01 RX ORDER — POTASSIUM CHLORIDE 20MEQ/15ML
40 LIQUID (ML) ORAL PRN
Status: DISCONTINUED | OUTPATIENT
Start: 2017-05-01 | End: 2017-05-05 | Stop reason: HOSPADM

## 2017-05-01 RX ORDER — MAGNESIUM SULFATE 1 G/100ML
1 INJECTION INTRAVENOUS PRN
Status: DISCONTINUED | OUTPATIENT
Start: 2017-05-01 | End: 2017-05-05 | Stop reason: HOSPADM

## 2017-05-01 RX ORDER — SERTRALINE HYDROCHLORIDE 25 MG/1
25 TABLET, FILM COATED ORAL NIGHTLY
Status: DISCONTINUED | OUTPATIENT
Start: 2017-05-01 | End: 2017-05-05 | Stop reason: HOSPADM

## 2017-05-01 RX ORDER — 0.9 % SODIUM CHLORIDE 0.9 %
1000 INTRAVENOUS SOLUTION INTRAVENOUS ONCE
Status: COMPLETED | OUTPATIENT
Start: 2017-05-01 | End: 2017-05-01

## 2017-05-01 RX ORDER — FUROSEMIDE 40 MG/1
40 TABLET ORAL DAILY
Status: DISCONTINUED | OUTPATIENT
Start: 2017-05-02 | End: 2017-05-05 | Stop reason: HOSPADM

## 2017-05-01 RX ORDER — PANTOPRAZOLE SODIUM 40 MG/1
40 TABLET, DELAYED RELEASE ORAL
Status: DISCONTINUED | OUTPATIENT
Start: 2017-05-02 | End: 2017-05-05 | Stop reason: HOSPADM

## 2017-05-01 RX ORDER — NICOTINE 21 MG/24HR
1 PATCH, TRANSDERMAL 24 HOURS TRANSDERMAL DAILY PRN
Status: DISCONTINUED | OUTPATIENT
Start: 2017-05-01 | End: 2017-05-05 | Stop reason: HOSPADM

## 2017-05-01 RX ORDER — ALBUTEROL SULFATE 2.5 MG/3ML
2.5 SOLUTION RESPIRATORY (INHALATION) 4 TIMES DAILY
Status: DISCONTINUED | OUTPATIENT
Start: 2017-05-01 | End: 2017-05-05 | Stop reason: HOSPADM

## 2017-05-01 RX ORDER — SODIUM CHLORIDE 0.9 % (FLUSH) 0.9 %
10 SYRINGE (ML) INJECTION PRN
Status: DISCONTINUED | OUTPATIENT
Start: 2017-05-01 | End: 2017-05-05 | Stop reason: HOSPADM

## 2017-05-01 RX ORDER — SODIUM CHLORIDE 9 MG/ML
INJECTION, SOLUTION INTRAVENOUS CONTINUOUS
Status: DISCONTINUED | OUTPATIENT
Start: 2017-05-01 | End: 2017-05-05 | Stop reason: HOSPADM

## 2017-05-01 RX ORDER — ACETAMINOPHEN 325 MG/1
650 TABLET ORAL EVERY 4 HOURS PRN
Status: CANCELLED | OUTPATIENT
Start: 2017-05-01

## 2017-05-01 RX ADMIN — SODIUM CHLORIDE: 9 INJECTION, SOLUTION INTRAVENOUS at 22:44

## 2017-05-01 RX ADMIN — Medication 10 ML: at 22:43

## 2017-05-01 RX ADMIN — SODIUM CHLORIDE 1000 ML: 9 INJECTION, SOLUTION INTRAVENOUS at 14:22

## 2017-05-01 RX ADMIN — HYDROMORPHONE HYDROCHLORIDE 1 MG: 1 INJECTION, SOLUTION INTRAMUSCULAR; INTRAVENOUS; SUBCUTANEOUS at 16:27

## 2017-05-01 RX ADMIN — ONDANSETRON 4 MG: 2 INJECTION INTRAMUSCULAR; INTRAVENOUS at 16:26

## 2017-05-01 RX ADMIN — HYDROMORPHONE HYDROCHLORIDE 1 MG: 1 INJECTION, SOLUTION INTRAMUSCULAR; INTRAVENOUS; SUBCUTANEOUS at 14:24

## 2017-05-01 RX ADMIN — ONDANSETRON 4 MG: 2 INJECTION INTRAMUSCULAR; INTRAVENOUS at 14:23

## 2017-05-01 ASSESSMENT — ENCOUNTER SYMPTOMS
NAUSEA: 1
EYE PAIN: 0
VOMITING: 0
VOMITING: 1
ABDOMINAL PAIN: 1
SORE THROAT: 0
BACK PAIN: 0
RHINORRHEA: 0
DIARRHEA: 0
COUGH: 0
WHEEZING: 0
SHORTNESS OF BREATH: 0
NAUSEA: 1
EYE DISCHARGE: 0
CHEST TIGHTNESS: 0
APNEA: 0
RHINORRHEA: 0
STRIDOR: 0
TROUBLE SWALLOWING: 0
VOICE CHANGE: 0
ABDOMINAL DISTENTION: 0
PHOTOPHOBIA: 0
COUGH: 0
COLOR CHANGE: 0
CHOKING: 0
CONSTIPATION: 0
SHORTNESS OF BREATH: 0
EYE PAIN: 0
EYE REDNESS: 0
BACK PAIN: 0
EYE REDNESS: 0
ABDOMINAL PAIN: 1
BLOOD IN STOOL: 0

## 2017-05-01 ASSESSMENT — PAIN DESCRIPTION - LOCATION
LOCATION: ABDOMEN
LOCATION: ABDOMEN

## 2017-05-01 ASSESSMENT — PAIN SCALES - GENERAL
PAINLEVEL_OUTOF10: 10
PAINLEVEL_OUTOF10: 10
PAINLEVEL_OUTOF10: 8
PAINLEVEL_OUTOF10: 0
PAINLEVEL_OUTOF10: 9

## 2017-05-01 ASSESSMENT — PAIN DESCRIPTION - DESCRIPTORS
DESCRIPTORS: ACHING
DESCRIPTORS: ACHING

## 2017-05-01 ASSESSMENT — PAIN DESCRIPTION - FREQUENCY
FREQUENCY: CONTINUOUS
FREQUENCY: CONTINUOUS

## 2017-05-01 ASSESSMENT — PAIN DESCRIPTION - PAIN TYPE
TYPE: ACUTE PAIN
TYPE: ACUTE PAIN

## 2017-05-01 ASSESSMENT — PAIN DESCRIPTION - ORIENTATION
ORIENTATION: MID
ORIENTATION: LEFT

## 2017-05-02 ENCOUNTER — APPOINTMENT (OUTPATIENT)
Dept: GENERAL RADIOLOGY | Age: 62
DRG: 394 | End: 2017-05-02
Payer: COMMERCIAL

## 2017-05-02 PROBLEM — J45.20 INTERMITTENT ASTHMA: Status: ACTIVE | Noted: 2017-05-02

## 2017-05-02 LAB
-: NORMAL
ABSOLUTE EOS #: 0.1 K/UL (ref 0–0.4)
ABSOLUTE LYMPH #: 1.2 K/UL (ref 1–4.8)
ABSOLUTE MONO #: 0.3 K/UL (ref 0.1–1.2)
ALBUMIN SERPL-MCNC: 3.3 G/DL (ref 3.5–5.2)
ALBUMIN/GLOBULIN RATIO: 1.1 (ref 1–2.5)
ALP BLD-CCNC: 490 U/L (ref 35–104)
ALT SERPL-CCNC: 186 U/L (ref 5–33)
AMORPHOUS: NORMAL
ANION GAP SERPL CALCULATED.3IONS-SCNC: 12 MMOL/L (ref 9–17)
AST SERPL-CCNC: 418 U/L
BACTERIA: NORMAL
BASOPHILS # BLD: 1 %
BASOPHILS ABSOLUTE: 0 K/UL (ref 0–0.2)
BILIRUB SERPL-MCNC: 1 MG/DL (ref 0.3–1.2)
BILIRUBIN URINE: NEGATIVE
BUN BLDV-MCNC: 20 MG/DL (ref 8–23)
BUN/CREAT BLD: ABNORMAL (ref 9–20)
CALCIUM SERPL-MCNC: 7.6 MG/DL (ref 8.6–10.4)
CASTS UA: NORMAL /LPF (ref 0–8)
CHLORIDE BLD-SCNC: 107 MMOL/L (ref 98–107)
CO2: 22 MMOL/L (ref 20–31)
COLOR: YELLOW
COMMENT UA: ABNORMAL
CREAT SERPL-MCNC: 0.79 MG/DL (ref 0.5–0.9)
CRYSTALS, UA: NORMAL /HPF
DIFFERENTIAL TYPE: ABNORMAL
EOSINOPHILS RELATIVE PERCENT: 2 %
EPITHELIAL CELLS UA: NORMAL /HPF (ref 0–5)
GFR AFRICAN AMERICAN: >60 ML/MIN
GFR NON-AFRICAN AMERICAN: >60 ML/MIN
GFR SERPL CREATININE-BSD FRML MDRD: ABNORMAL ML/MIN/{1.73_M2}
GFR SERPL CREATININE-BSD FRML MDRD: ABNORMAL ML/MIN/{1.73_M2}
GLUCOSE BLD-MCNC: 106 MG/DL (ref 70–99)
GLUCOSE URINE: NEGATIVE
HCT VFR BLD CALC: 36.4 % (ref 36–46)
HEMOGLOBIN: 11.8 G/DL (ref 12–16)
KETONES, URINE: NEGATIVE
LEUKOCYTE ESTERASE, URINE: ABNORMAL
LYMPHOCYTES # BLD: 21 %
MAGNESIUM: 2.7 MG/DL (ref 1.6–2.6)
MCH RBC QN AUTO: 29.4 PG (ref 26–34)
MCHC RBC AUTO-ENTMCNC: 32.5 G/DL (ref 31–37)
MCV RBC AUTO: 90.4 FL (ref 80–100)
MONOCYTES # BLD: 6 %
MUCUS: NORMAL
NITRITE, URINE: NEGATIVE
OTHER OBSERVATIONS UA: NORMAL
PDW BLD-RTO: 16.7 % (ref 12.5–15.4)
PH UA: 7 (ref 5–8)
PLATELET # BLD: 198 K/UL (ref 140–450)
PLATELET ESTIMATE: ABNORMAL
PMV BLD AUTO: 7.6 FL (ref 6–12)
POTASSIUM SERPL-SCNC: 4.6 MMOL/L (ref 3.7–5.3)
PROTEIN UA: NEGATIVE
RBC # BLD: 4.02 M/UL (ref 4–5.2)
RBC # BLD: ABNORMAL 10*6/UL
RBC UA: NORMAL /HPF (ref 0–4)
RENAL EPITHELIAL, UA: NORMAL /HPF
SEG NEUTROPHILS: 70 %
SEGMENTED NEUTROPHILS ABSOLUTE COUNT: 4 K/UL (ref 1.8–7.7)
SODIUM BLD-SCNC: 141 MMOL/L (ref 135–144)
SPECIFIC GRAVITY UA: 1.02 (ref 1–1.03)
TOTAL PROTEIN: 6.2 G/DL (ref 6.4–8.3)
TRICHOMONAS: NORMAL
TURBIDITY: CLEAR
URINE HGB: NEGATIVE
UROBILINOGEN, URINE: ABNORMAL
WBC # BLD: 5.6 K/UL (ref 3.5–11)
WBC # BLD: ABNORMAL 10*3/UL
WBC UA: NORMAL /HPF (ref 0–5)
YEAST: NORMAL

## 2017-05-02 PROCEDURE — 6370000000 HC RX 637 (ALT 250 FOR IP): Performed by: NURSE PRACTITIONER

## 2017-05-02 PROCEDURE — 74020 XR ABDOMEN STANDARD: CPT

## 2017-05-02 PROCEDURE — 99222 1ST HOSP IP/OBS MODERATE 55: CPT | Performed by: FAMILY MEDICINE

## 2017-05-02 PROCEDURE — 36415 COLL VENOUS BLD VENIPUNCTURE: CPT

## 2017-05-02 PROCEDURE — 6360000002 HC RX W HCPCS: Performed by: NURSE PRACTITIONER

## 2017-05-02 PROCEDURE — 81001 URINALYSIS AUTO W/SCOPE: CPT

## 2017-05-02 PROCEDURE — 1200000000 HC SEMI PRIVATE

## 2017-05-02 PROCEDURE — 85025 COMPLETE CBC W/AUTO DIFF WBC: CPT

## 2017-05-02 PROCEDURE — 94640 AIRWAY INHALATION TREATMENT: CPT

## 2017-05-02 PROCEDURE — 83735 ASSAY OF MAGNESIUM: CPT

## 2017-05-02 PROCEDURE — 80053 COMPREHEN METABOLIC PANEL: CPT

## 2017-05-02 PROCEDURE — 2580000003 HC RX 258: Performed by: NURSE PRACTITIONER

## 2017-05-02 RX ADMIN — HYDROMORPHONE HYDROCHLORIDE 0.5 MG: 1 INJECTION, SOLUTION INTRAMUSCULAR; INTRAVENOUS; SUBCUTANEOUS at 19:13

## 2017-05-02 RX ADMIN — ALBUTEROL SULFATE 2.5 MG: 2.5 SOLUTION RESPIRATORY (INHALATION) at 12:11

## 2017-05-02 RX ADMIN — ALBUTEROL SULFATE 2.5 MG: 2.5 SOLUTION RESPIRATORY (INHALATION) at 08:26

## 2017-05-02 RX ADMIN — BECLOMETHASONE DIPROPIONATE 2 PUFF: 80 AEROSOL, METERED RESPIRATORY (INHALATION) at 08:26

## 2017-05-02 RX ADMIN — HYDROMORPHONE HYDROCHLORIDE 0.5 MG: 1 INJECTION, SOLUTION INTRAMUSCULAR; INTRAVENOUS; SUBCUTANEOUS at 06:55

## 2017-05-02 RX ADMIN — ENOXAPARIN SODIUM 40 MG: 40 INJECTION SUBCUTANEOUS at 08:35

## 2017-05-02 RX ADMIN — Medication 10 ML: at 08:35

## 2017-05-02 RX ADMIN — ALBUTEROL SULFATE 2.5 MG: 2.5 SOLUTION RESPIRATORY (INHALATION) at 00:39

## 2017-05-02 RX ADMIN — ALBUTEROL SULFATE 2.5 MG: 2.5 SOLUTION RESPIRATORY (INHALATION) at 15:32

## 2017-05-02 RX ADMIN — HYDROMORPHONE HYDROCHLORIDE 0.5 MG: 1 INJECTION, SOLUTION INTRAMUSCULAR; INTRAVENOUS; SUBCUTANEOUS at 00:51

## 2017-05-02 RX ADMIN — ONDANSETRON 4 MG: 2 INJECTION, SOLUTION INTRAMUSCULAR; INTRAVENOUS at 19:11

## 2017-05-02 RX ADMIN — ENOXAPARIN SODIUM 40 MG: 40 INJECTION SUBCUTANEOUS at 22:11

## 2017-05-02 ASSESSMENT — PAIN SCALES - GENERAL
PAINLEVEL_OUTOF10: 5
PAINLEVEL_OUTOF10: 4
PAINLEVEL_OUTOF10: 6
PAINLEVEL_OUTOF10: 10
PAINLEVEL_OUTOF10: 8

## 2017-05-02 ASSESSMENT — ENCOUNTER SYMPTOMS
BLOOD IN STOOL: 0
SHORTNESS OF BREATH: 0
SINUS PRESSURE: 0
WHEEZING: 0
DIARRHEA: 0
VOMITING: 1
VOICE CHANGE: 0
ABDOMINAL PAIN: 1
CONSTIPATION: 0
SORE THROAT: 0
NAUSEA: 1
COUGH: 0

## 2017-05-02 ASSESSMENT — PAIN DESCRIPTION - LOCATION: LOCATION: ABDOMEN

## 2017-05-02 ASSESSMENT — PAIN DESCRIPTION - ONSET: ONSET: ON-GOING

## 2017-05-02 ASSESSMENT — PAIN DESCRIPTION - FREQUENCY: FREQUENCY: CONTINUOUS

## 2017-05-02 ASSESSMENT — PAIN DESCRIPTION - ORIENTATION: ORIENTATION: MID

## 2017-05-02 ASSESSMENT — PAIN DESCRIPTION - DESCRIPTORS: DESCRIPTORS: ACHING;CONSTANT;DISCOMFORT

## 2017-05-02 ASSESSMENT — PAIN DESCRIPTION - PAIN TYPE: TYPE: ACUTE PAIN

## 2017-05-02 ASSESSMENT — PAIN DESCRIPTION - PROGRESSION: CLINICAL_PROGRESSION: NOT CHANGED

## 2017-05-03 ENCOUNTER — APPOINTMENT (OUTPATIENT)
Dept: GENERAL RADIOLOGY | Age: 62
DRG: 394 | End: 2017-05-03
Payer: COMMERCIAL

## 2017-05-03 LAB
ALBUMIN SERPL-MCNC: 3.3 G/DL (ref 3.5–5.2)
ALBUMIN/GLOBULIN RATIO: 1.2 (ref 1–2.5)
ALP BLD-CCNC: 436 U/L (ref 35–104)
ALT SERPL-CCNC: 122 U/L (ref 5–33)
ANION GAP SERPL CALCULATED.3IONS-SCNC: 13 MMOL/L (ref 9–17)
AST SERPL-CCNC: 120 U/L
BILIRUB SERPL-MCNC: 1.04 MG/DL (ref 0.3–1.2)
BILIRUBIN DIRECT: 0.22 MG/DL
BILIRUBIN, INDIRECT: 0.82 MG/DL (ref 0–1)
BUN BLDV-MCNC: 14 MG/DL (ref 8–23)
BUN/CREAT BLD: ABNORMAL (ref 9–20)
CALCIUM SERPL-MCNC: 8.1 MG/DL (ref 8.6–10.4)
CHLORIDE BLD-SCNC: 104 MMOL/L (ref 98–107)
CO2: 22 MMOL/L (ref 20–31)
CREAT SERPL-MCNC: 0.76 MG/DL (ref 0.5–0.9)
GFR AFRICAN AMERICAN: >60 ML/MIN
GFR NON-AFRICAN AMERICAN: >60 ML/MIN
GFR SERPL CREATININE-BSD FRML MDRD: ABNORMAL ML/MIN/{1.73_M2}
GFR SERPL CREATININE-BSD FRML MDRD: ABNORMAL ML/MIN/{1.73_M2}
GLOBULIN: ABNORMAL G/DL (ref 1.5–3.8)
GLUCOSE BLD-MCNC: 91 MG/DL (ref 70–99)
POTASSIUM SERPL-SCNC: 4.5 MMOL/L (ref 3.7–5.3)
SODIUM BLD-SCNC: 139 MMOL/L (ref 135–144)
TOTAL PROTEIN: 6.1 G/DL (ref 6.4–8.3)

## 2017-05-03 PROCEDURE — 6360000002 HC RX W HCPCS: Performed by: NURSE PRACTITIONER

## 2017-05-03 PROCEDURE — 80076 HEPATIC FUNCTION PANEL: CPT

## 2017-05-03 PROCEDURE — 1200000000 HC SEMI PRIVATE

## 2017-05-03 PROCEDURE — 2580000003 HC RX 258: Performed by: NURSE PRACTITIONER

## 2017-05-03 PROCEDURE — 94640 AIRWAY INHALATION TREATMENT: CPT

## 2017-05-03 PROCEDURE — 80048 BASIC METABOLIC PNL TOTAL CA: CPT

## 2017-05-03 PROCEDURE — 36415 COLL VENOUS BLD VENIPUNCTURE: CPT

## 2017-05-03 PROCEDURE — 74020 XR ABDOMEN STANDARD: CPT

## 2017-05-03 PROCEDURE — 99232 SBSQ HOSP IP/OBS MODERATE 35: CPT | Performed by: FAMILY MEDICINE

## 2017-05-03 RX ORDER — HYDRALAZINE HYDROCHLORIDE 20 MG/ML
5 INJECTION INTRAMUSCULAR; INTRAVENOUS EVERY 6 HOURS PRN
Status: DISCONTINUED | OUTPATIENT
Start: 2017-05-03 | End: 2017-05-05 | Stop reason: HOSPADM

## 2017-05-03 RX ADMIN — ENOXAPARIN SODIUM 40 MG: 40 INJECTION SUBCUTANEOUS at 21:52

## 2017-05-03 RX ADMIN — ONDANSETRON 4 MG: 2 INJECTION, SOLUTION INTRAMUSCULAR; INTRAVENOUS at 14:24

## 2017-05-03 RX ADMIN — Medication 10 ML: at 08:41

## 2017-05-03 RX ADMIN — ENOXAPARIN SODIUM 40 MG: 40 INJECTION SUBCUTANEOUS at 08:41

## 2017-05-03 RX ADMIN — HYDROMORPHONE HYDROCHLORIDE 0.5 MG: 1 INJECTION, SOLUTION INTRAMUSCULAR; INTRAVENOUS; SUBCUTANEOUS at 15:49

## 2017-05-03 RX ADMIN — ALBUTEROL SULFATE 2.5 MG: 2.5 SOLUTION RESPIRATORY (INHALATION) at 11:39

## 2017-05-03 ASSESSMENT — PAIN SCALES - GENERAL: PAINLEVEL_OUTOF10: 9

## 2017-05-03 ASSESSMENT — ENCOUNTER SYMPTOMS
DIARRHEA: 0
SINUS PRESSURE: 0
CONSTIPATION: 0
VOICE CHANGE: 0
ABDOMINAL PAIN: 1
BLOOD IN STOOL: 0
WHEEZING: 0
SORE THROAT: 0
NAUSEA: 0
COUGH: 0
VOMITING: 0
SHORTNESS OF BREATH: 0

## 2017-05-04 ENCOUNTER — APPOINTMENT (OUTPATIENT)
Dept: GENERAL RADIOLOGY | Age: 62
DRG: 394 | End: 2017-05-04
Payer: COMMERCIAL

## 2017-05-04 LAB
ANION GAP SERPL CALCULATED.3IONS-SCNC: 12 MMOL/L (ref 9–17)
BUN BLDV-MCNC: 13 MG/DL (ref 8–23)
BUN/CREAT BLD: ABNORMAL (ref 9–20)
CALCIUM SERPL-MCNC: 8.3 MG/DL (ref 8.6–10.4)
CHLORIDE BLD-SCNC: 105 MMOL/L (ref 98–107)
CO2: 21 MMOL/L (ref 20–31)
CREAT SERPL-MCNC: 0.8 MG/DL (ref 0.5–0.9)
GFR AFRICAN AMERICAN: >60 ML/MIN
GFR NON-AFRICAN AMERICAN: >60 ML/MIN
GFR SERPL CREATININE-BSD FRML MDRD: ABNORMAL ML/MIN/{1.73_M2}
GFR SERPL CREATININE-BSD FRML MDRD: ABNORMAL ML/MIN/{1.73_M2}
GLUCOSE BLD-MCNC: 84 MG/DL (ref 70–99)
POTASSIUM SERPL-SCNC: 4.4 MMOL/L (ref 3.7–5.3)
SODIUM BLD-SCNC: 138 MMOL/L (ref 135–144)

## 2017-05-04 PROCEDURE — 36415 COLL VENOUS BLD VENIPUNCTURE: CPT

## 2017-05-04 PROCEDURE — 6360000002 HC RX W HCPCS: Performed by: NURSE PRACTITIONER

## 2017-05-04 PROCEDURE — 2580000003 HC RX 258: Performed by: NURSE PRACTITIONER

## 2017-05-04 PROCEDURE — 80048 BASIC METABOLIC PNL TOTAL CA: CPT

## 2017-05-04 PROCEDURE — 74020 XR ABDOMEN STANDARD: CPT

## 2017-05-04 PROCEDURE — 1200000000 HC SEMI PRIVATE

## 2017-05-04 PROCEDURE — 94640 AIRWAY INHALATION TREATMENT: CPT

## 2017-05-04 PROCEDURE — 6370000000 HC RX 637 (ALT 250 FOR IP): Performed by: NURSE PRACTITIONER

## 2017-05-04 PROCEDURE — 99232 SBSQ HOSP IP/OBS MODERATE 35: CPT | Performed by: INTERNAL MEDICINE

## 2017-05-04 RX ADMIN — ENOXAPARIN SODIUM 40 MG: 40 INJECTION SUBCUTANEOUS at 20:22

## 2017-05-04 RX ADMIN — SERTRALINE 25 MG: 25 TABLET, FILM COATED ORAL at 20:22

## 2017-05-04 RX ADMIN — ALBUTEROL SULFATE 2.5 MG: 2.5 SOLUTION RESPIRATORY (INHALATION) at 21:19

## 2017-05-04 RX ADMIN — ALBUTEROL SULFATE 2.5 MG: 2.5 SOLUTION RESPIRATORY (INHALATION) at 15:32

## 2017-05-04 RX ADMIN — ALBUTEROL SULFATE 2.5 MG: 2.5 SOLUTION RESPIRATORY (INHALATION) at 13:16

## 2017-05-04 RX ADMIN — BECLOMETHASONE DIPROPIONATE 2 PUFF: 80 AEROSOL, METERED RESPIRATORY (INHALATION) at 08:03

## 2017-05-04 RX ADMIN — ENOXAPARIN SODIUM 40 MG: 40 INJECTION SUBCUTANEOUS at 10:40

## 2017-05-04 RX ADMIN — SODIUM CHLORIDE: 9 INJECTION, SOLUTION INTRAVENOUS at 19:33

## 2017-05-04 RX ADMIN — ALBUTEROL SULFATE 2.5 MG: 2.5 SOLUTION RESPIRATORY (INHALATION) at 08:01

## 2017-05-05 VITALS
WEIGHT: 293 LBS | RESPIRATION RATE: 15 BRPM | DIASTOLIC BLOOD PRESSURE: 85 MMHG | HEART RATE: 67 BPM | OXYGEN SATURATION: 98 % | SYSTOLIC BLOOD PRESSURE: 128 MMHG | BODY MASS INDEX: 48.82 KG/M2 | TEMPERATURE: 97.7 F | HEIGHT: 65 IN

## 2017-05-05 LAB
ANION GAP SERPL CALCULATED.3IONS-SCNC: 16 MMOL/L (ref 9–17)
BUN BLDV-MCNC: 11 MG/DL (ref 8–23)
BUN/CREAT BLD: ABNORMAL (ref 9–20)
CALCIUM SERPL-MCNC: 8.3 MG/DL (ref 8.6–10.4)
CHLORIDE BLD-SCNC: 106 MMOL/L (ref 98–107)
CO2: 18 MMOL/L (ref 20–31)
CREAT SERPL-MCNC: 0.73 MG/DL (ref 0.5–0.9)
GFR AFRICAN AMERICAN: >60 ML/MIN
GFR NON-AFRICAN AMERICAN: >60 ML/MIN
GFR SERPL CREATININE-BSD FRML MDRD: ABNORMAL ML/MIN/{1.73_M2}
GFR SERPL CREATININE-BSD FRML MDRD: ABNORMAL ML/MIN/{1.73_M2}
GLUCOSE BLD-MCNC: 96 MG/DL (ref 70–99)
POTASSIUM SERPL-SCNC: 4.3 MMOL/L (ref 3.7–5.3)
SODIUM BLD-SCNC: 140 MMOL/L (ref 135–144)

## 2017-05-05 PROCEDURE — 99238 HOSP IP/OBS DSCHRG MGMT 30/<: CPT | Performed by: INTERNAL MEDICINE

## 2017-05-05 PROCEDURE — 6360000002 HC RX W HCPCS: Performed by: NURSE PRACTITIONER

## 2017-05-05 PROCEDURE — 6370000000 HC RX 637 (ALT 250 FOR IP): Performed by: NURSE PRACTITIONER

## 2017-05-05 PROCEDURE — 2580000003 HC RX 258: Performed by: NURSE PRACTITIONER

## 2017-05-05 PROCEDURE — 36415 COLL VENOUS BLD VENIPUNCTURE: CPT

## 2017-05-05 PROCEDURE — 80048 BASIC METABOLIC PNL TOTAL CA: CPT

## 2017-05-05 PROCEDURE — 94640 AIRWAY INHALATION TREATMENT: CPT

## 2017-05-05 RX ADMIN — PANTOPRAZOLE SODIUM 40 MG: 40 TABLET, DELAYED RELEASE ORAL at 05:55

## 2017-05-05 RX ADMIN — FUROSEMIDE 40 MG: 40 TABLET ORAL at 10:47

## 2017-05-05 RX ADMIN — BECLOMETHASONE DIPROPIONATE 2 PUFF: 80 AEROSOL, METERED RESPIRATORY (INHALATION) at 10:09

## 2017-05-05 RX ADMIN — MISOPROSTOL 200 MCG: 100 TABLET ORAL at 10:47

## 2017-05-05 RX ADMIN — ENOXAPARIN SODIUM 40 MG: 40 INJECTION SUBCUTANEOUS at 10:47

## 2017-05-05 RX ADMIN — ALBUTEROL SULFATE 2.5 MG: 2.5 SOLUTION RESPIRATORY (INHALATION) at 12:40

## 2017-05-05 RX ADMIN — ALBUTEROL SULFATE 2.5 MG: 2.5 SOLUTION RESPIRATORY (INHALATION) at 10:08

## 2017-05-05 RX ADMIN — Medication 10 ML: at 10:47

## 2017-05-05 RX ADMIN — FERROUS SULFATE TAB EC 325 MG (65 MG FE EQUIVALENT) 325 MG: 325 (65 FE) TABLET DELAYED RESPONSE at 10:47

## 2017-05-05 ASSESSMENT — ENCOUNTER SYMPTOMS
DIARRHEA: 0
SORE THROAT: 0
COUGH: 0
NAUSEA: 0
ABDOMINAL PAIN: 0
CHOKING: 0
SHORTNESS OF BREATH: 0
ABDOMINAL DISTENTION: 0

## 2017-05-05 ASSESSMENT — PAIN DESCRIPTION - PROGRESSION
CLINICAL_PROGRESSION: NOT CHANGED

## 2017-05-05 ASSESSMENT — PAIN SCALES - GENERAL: PAINLEVEL_OUTOF10: 3

## 2017-05-05 ASSESSMENT — PAIN DESCRIPTION - ORIENTATION: ORIENTATION: POSTERIOR

## 2017-05-05 ASSESSMENT — PAIN DESCRIPTION - DESCRIPTORS: DESCRIPTORS: ACHING

## 2017-05-05 ASSESSMENT — PAIN DESCRIPTION - FREQUENCY: FREQUENCY: CONTINUOUS

## 2017-05-05 ASSESSMENT — PAIN DESCRIPTION - LOCATION: LOCATION: KNEE;BACK

## 2017-05-05 ASSESSMENT — PAIN DESCRIPTION - ONSET: ONSET: ON-GOING

## 2017-05-05 ASSESSMENT — PAIN DESCRIPTION - PAIN TYPE: TYPE: CHRONIC PAIN

## 2017-12-22 ENCOUNTER — HOSPITAL ENCOUNTER (OUTPATIENT)
Age: 62
Discharge: HOME | End: 2017-12-22
Payer: COMMERCIAL

## 2017-12-22 LAB
ALBUMIN SERPL-MCNC: 4.2 G/DL (ref 3.5–5.2)
ALBUMIN/GLOBULIN RATIO: ABNORMAL (ref 1–2.5)
ALP BLD-CCNC: 394 U/L (ref 35–104)
ALT SERPL-CCNC: 15 U/L (ref 5–33)
ANION GAP SERPL CALCULATED.3IONS-SCNC: 18 MMOL/L (ref 9–17)
AST SERPL-CCNC: 21 U/L
BILIRUB SERPL-MCNC: 0.85 MG/DL (ref 0.3–1.2)
BUN BLDV-MCNC: 15 MG/DL (ref 8–23)
BUN/CREAT BLD: 15 (ref 9–20)
CALCIUM SERPL-MCNC: 7.5 MG/DL (ref 8.6–10.4)
CHLORIDE BLD-SCNC: 107 MMOL/L (ref 98–107)
CO2: 20 MMOL/L (ref 20–31)
CREAT SERPL-MCNC: 1.03 MG/DL (ref 0.5–0.9)
GFR AFRICAN AMERICAN: >60 ML/MIN
GFR NON-AFRICAN AMERICAN: 54 ML/MIN
GFR SERPL CREATININE-BSD FRML MDRD: ABNORMAL ML/MIN/{1.73_M2}
GFR SERPL CREATININE-BSD FRML MDRD: ABNORMAL ML/MIN/{1.73_M2}
GLUCOSE BLD-MCNC: 144 MG/DL (ref 70–99)
POTASSIUM SERPL-SCNC: 3.6 MMOL/L (ref 3.7–5.3)
SODIUM BLD-SCNC: 145 MMOL/L (ref 135–144)
THYROXINE, FREE: 0.88 NG/DL (ref 0.93–1.7)
TOTAL PROTEIN: 6.8 G/DL (ref 6.4–8.3)
TSH SERPL DL<=0.05 MIU/L-ACNC: 3 MIU/L (ref 0.3–5)

## 2017-12-22 PROCEDURE — 84439 ASSAY OF FREE THYROXINE: CPT

## 2017-12-22 PROCEDURE — 80053 COMPREHEN METABOLIC PANEL: CPT

## 2017-12-22 PROCEDURE — 84443 ASSAY THYROID STIM HORMONE: CPT

## 2017-12-22 PROCEDURE — 84481 FREE ASSAY (FT-3): CPT

## 2017-12-22 PROCEDURE — 36415 COLL VENOUS BLD VENIPUNCTURE: CPT

## 2017-12-23 LAB — T3 FREE: 4.25 PG/ML (ref 2.02–4.43)

## 2018-01-21 ENCOUNTER — HOSPITAL ENCOUNTER (EMERGENCY)
Age: 63
Discharge: HOME | End: 2018-01-21
Attending: EMERGENCY MEDICINE
Payer: COMMERCIAL

## 2018-01-21 ENCOUNTER — APPOINTMENT (OUTPATIENT)
Dept: GENERAL RADIOLOGY | Age: 63
End: 2018-01-21
Payer: COMMERCIAL

## 2018-01-21 VITALS
OXYGEN SATURATION: 98 % | WEIGHT: 293 LBS | DIASTOLIC BLOOD PRESSURE: 63 MMHG | HEART RATE: 69 BPM | TEMPERATURE: 97.5 F | BODY MASS INDEX: 48.82 KG/M2 | SYSTOLIC BLOOD PRESSURE: 119 MMHG | HEIGHT: 65 IN | RESPIRATION RATE: 18 BRPM

## 2018-01-21 DIAGNOSIS — S62.91XA CLOSED FRACTURE OF RIGHT HAND, INITIAL ENCOUNTER: Primary | ICD-10-CM

## 2018-01-21 PROCEDURE — 99283 EMERGENCY DEPT VISIT LOW MDM: CPT

## 2018-01-21 PROCEDURE — 73110 X-RAY EXAM OF WRIST: CPT

## 2018-01-21 PROCEDURE — 73130 X-RAY EXAM OF HAND: CPT

## 2018-01-21 PROCEDURE — 6370000000 HC RX 637 (ALT 250 FOR IP): Performed by: NURSE PRACTITIONER

## 2018-01-21 PROCEDURE — 29125 APPL SHORT ARM SPLINT STATIC: CPT

## 2018-01-21 RX ORDER — HYDROCODONE BITARTRATE AND ACETAMINOPHEN 5; 325 MG/1; MG/1
1 TABLET ORAL EVERY 6 HOURS PRN
Qty: 10 TABLET | Refills: 0 | Status: SHIPPED | OUTPATIENT
Start: 2018-01-21 | End: 2018-01-24

## 2018-01-21 RX ORDER — OMEPRAZOLE 20 MG/1
20 CAPSULE, DELAYED RELEASE ORAL DAILY
COMMUNITY

## 2018-01-21 RX ORDER — HYDROCODONE BITARTRATE AND ACETAMINOPHEN 5; 325 MG/1; MG/1
1 TABLET ORAL ONCE
Status: COMPLETED | OUTPATIENT
Start: 2018-01-21 | End: 2018-01-21

## 2018-01-21 RX ADMIN — HYDROCODONE BITARTRATE AND ACETAMINOPHEN 1 TABLET: 5; 325 TABLET ORAL at 09:17

## 2018-01-21 ASSESSMENT — ENCOUNTER SYMPTOMS
DIARRHEA: 0
VOMITING: 0
SHORTNESS OF BREATH: 0
CONSTIPATION: 0
ABDOMINAL PAIN: 0
WHEEZING: 0
RHINORRHEA: 0
SORE THROAT: 0
SINUS PRESSURE: 0
COLOR CHANGE: 0
COUGH: 0
NAUSEA: 0

## 2018-01-21 ASSESSMENT — PAIN SCALES - GENERAL
PAINLEVEL_OUTOF10: 10
PAINLEVEL_OUTOF10: 10

## 2018-01-21 ASSESSMENT — PAIN DESCRIPTION - LOCATION: LOCATION: HAND

## 2018-01-21 ASSESSMENT — PAIN DESCRIPTION - ORIENTATION: ORIENTATION: RIGHT

## 2018-01-21 ASSESSMENT — PAIN DESCRIPTION - PAIN TYPE: TYPE: ACUTE PAIN

## 2018-01-21 ASSESSMENT — PAIN DESCRIPTION - DESCRIPTORS: DESCRIPTORS: ACHING

## 2018-01-21 NOTE — ED PROVIDER NOTES
°C) -- 69 18 98 % 5' 5\" (1.651 m) (!) 350 lb (158.8 kg)       Physical Exam   Constitutional: She is oriented to person, place, and time. She appears well-developed and well-nourished. HENT:   Head: Normocephalic and atraumatic. Mouth/Throat: Oropharynx is clear and moist.   Eyes: Conjunctivae are normal. Pupils are equal, round, and reactive to light. Neck: Normal range of motion. Neck supple. Cardiovascular: Normal rate and regular rhythm. Pulmonary/Chest: Effort normal and breath sounds normal. No stridor. No respiratory distress. Abdominal: Soft. Bowel sounds are normal.   Musculoskeletal: Normal range of motion. Right wrist: She exhibits tenderness. Hands:  Lymphadenopathy:     She has no cervical adenopathy. Neurological: She is alert and oriented to person, place, and time. Skin: Skin is warm and dry. No rash noted. Psychiatric: She has a normal mood and affect. Vitals reviewed. RADIOLOGY:   Non-plain film images such as CT, Ultrasound and MRI are read by the radiologist. Plain radiographic images are visualized and preliminarily interpreted by the emergency physician with the below findings:    Xr Wrist Right (min 3 Views)    Result Date: 1/21/2018  EXAMINATION: 3 VIEWS OF THE RIGHT HAND; 4 VIEWS OF THE RIGHT WRIST 1/21/2018 9:22 am COMPARISON: None HISTORY: ORDERING SYSTEM PROVIDED HISTORY: Pain TECHNOLOGIST PROVIDED HISTORY: Reason for exam:->Pain Ordering Physician Provided Reason for Exam: Patient states she fell at 3am, pain to right wrist and right hand Acuity: Unknown Type of Exam: Unknown FINDINGS: Right wrist:  Soft tissues are within normal limits. No acute fracture or dislocation at the right wrist.  Scaphoid bone appears to be intact. Joint spaces at the wrist are preserved. No bony erosion. Right hand: There is a nondisplaced fracture at the proximal metaphysis of the 5th metacarpal.  No other acute fractures are identified. No dislocation.   There is mild to moderate degenerative change at the 1st carpometacarpal joint, 1st MCP joint, and triscaphe joints. No bony erosion. 1.  Right wrist:  No acute osseous abnormality. 2.  Right hand: Nondisplaced acute fracture at the proximal metaphysis of the 5th metacarpal.     Xr Hand Right (min 3 Views)    Result Date: 1/21/2018  EXAMINATION: 3 VIEWS OF THE RIGHT HAND; 4 VIEWS OF THE RIGHT WRIST 1/21/2018 9:22 am COMPARISON: None HISTORY: ORDERING SYSTEM PROVIDED HISTORY: Pain TECHNOLOGIST PROVIDED HISTORY: Reason for exam:->Pain Ordering Physician Provided Reason for Exam: Patient states she fell at 3am, pain to right wrist and right hand Acuity: Unknown Type of Exam: Unknown FINDINGS: Right wrist:  Soft tissues are within normal limits. No acute fracture or dislocation at the right wrist.  Scaphoid bone appears to be intact. Joint spaces at the wrist are preserved. No bony erosion. Right hand: There is a nondisplaced fracture at the proximal metaphysis of the 5th metacarpal.  No other acute fractures are identified. No dislocation. There is mild to moderate degenerative change at the 1st carpometacarpal joint, 1st MCP joint, and triscaphe joints. No bony erosion. 1.  Right wrist:  No acute osseous abnormality. 2.  Right hand: Nondisplaced acute fracture at the proximal metaphysis of the 5th metacarpal.     Interpretation per the Radiologist below, if available at the time of this note:    XR HAND RIGHT (MIN 3 VIEWS)   Preliminary Result   1. Right wrist:  No acute osseous abnormality. 2.  Right hand: Nondisplaced acute fracture at the proximal metaphysis of the   5th metacarpal.         XR WRIST RIGHT (MIN 3 VIEWS)   Preliminary Result   1. Right wrist:  No acute osseous abnormality.       2.  Right hand: Nondisplaced acute fracture at the proximal metaphysis of the   5th metacarpal.                 LABS:  Labs Reviewed - No data to display    All other labs were within normal range or not returned as of this dictation. EMERGENCY DEPARTMENT COURSE and DIFFERENTIAL DIAGNOSIS/MDM:   Vitals:    Vitals:    01/21/18 0844   BP: 119/63   Pulse: 69   Resp: 18   Temp: 97.5 °F (36.4 °C)   SpO2: 98%   Weight: (!) 350 lb (158.8 kg)   Height: 5' 5\" (1.651 m)       Medical Decision Making: He was placed in ulnar gutter splint, application checked by me and found to be appropriate the patient is neurovascularly intact. She was given some medication and orthopedic follow-up care. FINAL IMPRESSION      1. Closed fracture of right hand, initial encounter          DISPOSITION/PLAN   DISPOSITION Decision To Discharge 01/21/2018 09:52:50 AM      PATIENT REFERRED TO:   Pelon Griffith DO  [address] [place] [address]  [phone]            DISCHARGE MEDICATIONS:     New Prescriptions    HYDROCODONE-ACETAMINOPHEN (NORCO) 5-325 MG PER TABLET    Take 1 tablet by mouth every 6 hours as needed for Pain for up to 3 days.            (Please note that portions of this note were completed with a voice recognition program.  Efforts were made to edit the dictations but occasionally words are mis-transcribed.)    Vinnie Her NP, CNP  Certified Nurse Practitioner            Racheal Sebastian  01/21/18 4531

## 2018-06-26 ENCOUNTER — HOSPITAL ENCOUNTER (OUTPATIENT)
Age: 63
Discharge: HOME | End: 2018-06-26
Payer: COMMERCIAL

## 2018-06-26 LAB
ABSOLUTE EOS #: 0.3 K/UL (ref 0–0.4)
ABSOLUTE IMMATURE GRANULOCYTE: ABNORMAL K/UL (ref 0–0.3)
ABSOLUTE LYMPH #: 1.7 K/UL (ref 1–4.8)
ABSOLUTE MONO #: 0.3 K/UL (ref 0.2–0.8)
ALBUMIN SERPL-MCNC: 4.2 G/DL (ref 3.5–5.2)
ALBUMIN/GLOBULIN RATIO: ABNORMAL (ref 1–2.5)
ALP BLD-CCNC: 453 U/L (ref 35–104)
ALT SERPL-CCNC: 17 U/L (ref 5–33)
ANION GAP SERPL CALCULATED.3IONS-SCNC: 17 MMOL/L (ref 9–17)
AST SERPL-CCNC: 24 U/L
BASOPHILS # BLD: 1 % (ref 0–2)
BASOPHILS ABSOLUTE: 0.1 K/UL (ref 0–0.2)
BILIRUB SERPL-MCNC: 0.83 MG/DL (ref 0.3–1.2)
BUN BLDV-MCNC: 37 MG/DL (ref 8–23)
BUN/CREAT BLD: 30 (ref 9–20)
CALCIUM SERPL-MCNC: 7 MG/DL (ref 8.6–10.4)
CHLORIDE BLD-SCNC: 109 MMOL/L (ref 98–107)
CO2: 18 MMOL/L (ref 20–31)
CREAT SERPL-MCNC: 1.23 MG/DL (ref 0.5–0.9)
DIFFERENTIAL TYPE: ABNORMAL
EOSINOPHILS RELATIVE PERCENT: 5 % (ref 1–4)
GFR AFRICAN AMERICAN: 53 ML/MIN
GFR NON-AFRICAN AMERICAN: 44 ML/MIN
GFR SERPL CREATININE-BSD FRML MDRD: ABNORMAL ML/MIN/{1.73_M2}
GFR SERPL CREATININE-BSD FRML MDRD: ABNORMAL ML/MIN/{1.73_M2}
GLUCOSE BLD-MCNC: 98 MG/DL (ref 70–99)
HCT VFR BLD CALC: 41.3 % (ref 36–46)
HEMOGLOBIN: 13.2 G/DL (ref 12–16)
IMMATURE GRANULOCYTES: ABNORMAL %
LYMPHOCYTES # BLD: 26 % (ref 24–44)
MCH RBC QN AUTO: 29.3 PG (ref 26–34)
MCHC RBC AUTO-ENTMCNC: 32 G/DL (ref 31–37)
MCV RBC AUTO: 91.6 FL (ref 80–100)
MONOCYTES # BLD: 5 % (ref 1–7)
NRBC AUTOMATED: ABNORMAL PER 100 WBC
PDW BLD-RTO: 15.8 % (ref 11.5–14.5)
PLATELET # BLD: 225 K/UL (ref 130–400)
PLATELET ESTIMATE: ABNORMAL
PMV BLD AUTO: 8.2 FL (ref 6–12)
POTASSIUM SERPL-SCNC: 5 MMOL/L (ref 3.7–5.3)
RBC # BLD: 4.51 M/UL (ref 4–5.2)
RBC # BLD: ABNORMAL 10*6/UL
SEG NEUTROPHILS: 63 % (ref 36–66)
SEGMENTED NEUTROPHILS ABSOLUTE COUNT: 4.2 K/UL (ref 1.8–7.7)
SODIUM BLD-SCNC: 144 MMOL/L (ref 135–144)
TOTAL PROTEIN: 7.2 G/DL (ref 6.4–8.3)
WBC # BLD: 6.6 K/UL (ref 3.5–11)
WBC # BLD: ABNORMAL 10*3/UL

## 2018-06-26 PROCEDURE — 85025 COMPLETE CBC W/AUTO DIFF WBC: CPT

## 2018-06-26 PROCEDURE — 80053 COMPREHEN METABOLIC PANEL: CPT

## 2018-06-26 PROCEDURE — 36415 COLL VENOUS BLD VENIPUNCTURE: CPT

## 2018-12-03 ENCOUNTER — HOSPITAL ENCOUNTER (OUTPATIENT)
Age: 63
Discharge: HOME | End: 2018-12-03
Payer: MEDICARE

## 2018-12-03 LAB
ABSOLUTE EOS #: 0.19 K/UL (ref 0–0.44)
ABSOLUTE IMMATURE GRANULOCYTE: 0.04 K/UL (ref 0–0.3)
ABSOLUTE LYMPH #: 1.77 K/UL (ref 1.1–3.7)
ABSOLUTE MONO #: 0.34 K/UL (ref 0.1–1.2)
ANION GAP SERPL CALCULATED.3IONS-SCNC: 13 MMOL/L (ref 9–17)
BASOPHILS # BLD: 1 % (ref 0–2)
BASOPHILS ABSOLUTE: 0.04 K/UL (ref 0–0.2)
BUN BLDV-MCNC: 26 MG/DL (ref 8–23)
BUN/CREAT BLD: ABNORMAL (ref 9–20)
CALCIUM SERPL-MCNC: 7.8 MG/DL (ref 8.6–10.4)
CHLORIDE BLD-SCNC: 113 MMOL/L (ref 98–107)
CO2: 16 MMOL/L (ref 20–31)
CREAT SERPL-MCNC: 1.17 MG/DL (ref 0.5–0.9)
DIFFERENTIAL TYPE: ABNORMAL
EKG ATRIAL RATE: 63 BPM
EKG P AXIS: 80 DEGREES
EKG P-R INTERVAL: 214 MS
EKG Q-T INTERVAL: 498 MS
EKG QRS DURATION: 74 MS
EKG QTC CALCULATION (BAZETT): 509 MS
EKG R AXIS: 30 DEGREES
EKG T AXIS: 19 DEGREES
EKG VENTRICULAR RATE: 63 BPM
EOSINOPHILS RELATIVE PERCENT: 4 % (ref 1–4)
GFR AFRICAN AMERICAN: 56 ML/MIN
GFR NON-AFRICAN AMERICAN: 47 ML/MIN
GFR SERPL CREATININE-BSD FRML MDRD: ABNORMAL ML/MIN/{1.73_M2}
GFR SERPL CREATININE-BSD FRML MDRD: ABNORMAL ML/MIN/{1.73_M2}
GLUCOSE BLD-MCNC: 98 MG/DL (ref 70–99)
HCT VFR BLD CALC: 41.1 % (ref 36.3–47.1)
HEMOGLOBIN: 12.1 G/DL (ref 11.9–15.1)
IMMATURE GRANULOCYTES: 1 %
LYMPHOCYTES # BLD: 33 % (ref 24–43)
MCH RBC QN AUTO: 29.5 PG (ref 25.2–33.5)
MCHC RBC AUTO-ENTMCNC: 29.4 G/DL (ref 28.4–34.8)
MCV RBC AUTO: 100.2 FL (ref 82.6–102.9)
MONOCYTES # BLD: 6 % (ref 3–12)
NRBC AUTOMATED: 0 PER 100 WBC
PDW BLD-RTO: 14.6 % (ref 11.8–14.4)
PLATELET # BLD: 199 K/UL (ref 138–453)
PLATELET ESTIMATE: ABNORMAL
PMV BLD AUTO: 10.8 FL (ref 8.1–13.5)
POTASSIUM SERPL-SCNC: 4.9 MMOL/L (ref 3.7–5.3)
RBC # BLD: 4.1 M/UL (ref 3.95–5.11)
RBC # BLD: ABNORMAL 10*6/UL
SEG NEUTROPHILS: 55 % (ref 36–65)
SEGMENTED NEUTROPHILS ABSOLUTE COUNT: 3.02 K/UL (ref 1.5–8.1)
SODIUM BLD-SCNC: 142 MMOL/L (ref 135–144)
WBC # BLD: 5.4 K/UL (ref 3.5–11.3)
WBC # BLD: ABNORMAL 10*3/UL

## 2018-12-03 PROCEDURE — 85025 COMPLETE CBC W/AUTO DIFF WBC: CPT

## 2018-12-03 PROCEDURE — 80048 BASIC METABOLIC PNL TOTAL CA: CPT

## 2018-12-03 PROCEDURE — 93005 ELECTROCARDIOGRAM TRACING: CPT

## 2018-12-03 PROCEDURE — 36415 COLL VENOUS BLD VENIPUNCTURE: CPT

## 2019-02-26 ENCOUNTER — HOSPITAL ENCOUNTER (OUTPATIENT)
Age: 64
Discharge: HOME | End: 2019-02-26
Payer: MEDICARE

## 2019-02-26 PROCEDURE — 36415 COLL VENOUS BLD VENIPUNCTURE: CPT

## 2019-02-26 PROCEDURE — 80048 BASIC METABOLIC PNL TOTAL CA: CPT

## 2019-02-27 LAB
ANION GAP SERPL CALCULATED.3IONS-SCNC: 12 MMOL/L (ref 9–17)
BUN BLDV-MCNC: 20 MG/DL (ref 8–23)
BUN/CREAT BLD: ABNORMAL (ref 9–20)
CALCIUM SERPL-MCNC: 6 MG/DL (ref 8.6–10.4)
CHLORIDE BLD-SCNC: 114 MMOL/L (ref 98–107)
CO2: 17 MMOL/L (ref 20–31)
CREAT SERPL-MCNC: 1.08 MG/DL (ref 0.5–0.9)
GFR AFRICAN AMERICAN: >60 ML/MIN
GFR NON-AFRICAN AMERICAN: 51 ML/MIN
GFR SERPL CREATININE-BSD FRML MDRD: ABNORMAL ML/MIN/{1.73_M2}
GFR SERPL CREATININE-BSD FRML MDRD: ABNORMAL ML/MIN/{1.73_M2}
GLUCOSE BLD-MCNC: 121 MG/DL (ref 70–99)
POTASSIUM SERPL-SCNC: 4.8 MMOL/L (ref 3.7–5.3)
SODIUM BLD-SCNC: 143 MMOL/L (ref 135–144)

## 2019-05-14 ENCOUNTER — HOSPITAL ENCOUNTER (OUTPATIENT)
Age: 64
Discharge: HOME | End: 2019-05-14
Payer: MEDICARE

## 2019-05-14 LAB
ALBUMIN SERPL-MCNC: 4.2 G/DL (ref 3.5–5.2)
ALBUMIN/GLOBULIN RATIO: 1.5 (ref 1–2.5)
ALP BLD-CCNC: 399 U/L (ref 35–104)
ALT SERPL-CCNC: 16 U/L (ref 5–33)
ANION GAP SERPL CALCULATED.3IONS-SCNC: 14 MMOL/L (ref 9–17)
AST SERPL-CCNC: 20 U/L
BILIRUB SERPL-MCNC: 0.63 MG/DL (ref 0.3–1.2)
BUN BLDV-MCNC: 31 MG/DL (ref 8–23)
BUN/CREAT BLD: ABNORMAL (ref 9–20)
CALCIUM SERPL-MCNC: 6.7 MG/DL (ref 8.6–10.4)
CHLORIDE BLD-SCNC: 116 MMOL/L (ref 98–107)
CO2: 13 MMOL/L (ref 20–31)
CREAT SERPL-MCNC: 1.59 MG/DL (ref 0.5–0.9)
GFR AFRICAN AMERICAN: 39 ML/MIN
GFR NON-AFRICAN AMERICAN: 33 ML/MIN
GFR SERPL CREATININE-BSD FRML MDRD: ABNORMAL ML/MIN/{1.73_M2}
GFR SERPL CREATININE-BSD FRML MDRD: ABNORMAL ML/MIN/{1.73_M2}
GLUCOSE BLD-MCNC: 64 MG/DL (ref 70–99)
HCT VFR BLD CALC: 39.3 % (ref 36.3–47.1)
HEMOGLOBIN: 11.4 G/DL (ref 11.9–15.1)
MCH RBC QN AUTO: 28.4 PG (ref 25.2–33.5)
MCHC RBC AUTO-ENTMCNC: 29 G/DL (ref 28.4–34.8)
MCV RBC AUTO: 98 FL (ref 82.6–102.9)
NRBC AUTOMATED: 0 PER 100 WBC
PDW BLD-RTO: 15 % (ref 11.8–14.4)
PLATELET # BLD: 231 K/UL (ref 138–453)
PMV BLD AUTO: 10.1 FL (ref 8.1–13.5)
POTASSIUM SERPL-SCNC: 5.4 MMOL/L (ref 3.7–5.3)
RBC # BLD: 4.01 M/UL (ref 3.95–5.11)
SODIUM BLD-SCNC: 143 MMOL/L (ref 135–144)
TOTAL PROTEIN: 7 G/DL (ref 6.4–8.3)
VITAMIN B-12: 246 PG/ML (ref 232–1245)
VITAMIN D 25-HYDROXY: 6.3 NG/ML (ref 30–100)
WBC # BLD: 6.5 K/UL (ref 3.5–11.3)

## 2019-05-14 PROCEDURE — 82607 VITAMIN B-12: CPT

## 2019-05-14 PROCEDURE — 36415 COLL VENOUS BLD VENIPUNCTURE: CPT

## 2019-05-14 PROCEDURE — 85027 COMPLETE CBC AUTOMATED: CPT

## 2019-05-14 PROCEDURE — 80053 COMPREHEN METABOLIC PANEL: CPT

## 2019-05-14 PROCEDURE — 82306 VITAMIN D 25 HYDROXY: CPT

## 2019-05-21 ENCOUNTER — HOSPITAL ENCOUNTER (INPATIENT)
Age: 64
LOS: 3 days | Discharge: HOSPICE | DRG: 607 | End: 2019-05-25
Attending: FAMILY MEDICINE | Admitting: FAMILY MEDICINE
Payer: MEDICARE

## 2019-05-21 ENCOUNTER — APPOINTMENT (OUTPATIENT)
Dept: GENERAL RADIOLOGY | Age: 64
DRG: 607 | End: 2019-05-21
Attending: FAMILY MEDICINE
Payer: MEDICARE

## 2019-05-21 DIAGNOSIS — R60.1 ANASARCA: Primary | ICD-10-CM

## 2019-05-21 LAB
-: ABNORMAL
AMORPHOUS: ABNORMAL
ANION GAP SERPL CALCULATED.3IONS-SCNC: 10 MMOL/L (ref 9–17)
BACTERIA: ABNORMAL
BILIRUBIN URINE: NEGATIVE
BUN BLDV-MCNC: 36 MG/DL (ref 8–23)
BUN/CREAT BLD: ABNORMAL (ref 9–20)
CALCIUM SERPL-MCNC: 6 MG/DL (ref 8.6–10.4)
CASTS UA: ABNORMAL /LPF
CHLORIDE BLD-SCNC: 116 MMOL/L (ref 98–107)
CO2: 15 MMOL/L (ref 20–31)
COLOR: YELLOW
COMMENT UA: ABNORMAL
CREAT SERPL-MCNC: 1.52 MG/DL (ref 0.5–0.9)
CREATININE URINE: 107 MG/DL (ref 28–217)
CRYSTALS, UA: ABNORMAL /HPF
EOSINOPHIL,URINE: NORMAL
EPITHELIAL CELLS UA: ABNORMAL /HPF
GFR AFRICAN AMERICAN: 42 ML/MIN
GFR NON-AFRICAN AMERICAN: 34 ML/MIN
GFR SERPL CREATININE-BSD FRML MDRD: ABNORMAL ML/MIN/{1.73_M2}
GFR SERPL CREATININE-BSD FRML MDRD: ABNORMAL ML/MIN/{1.73_M2}
GLUCOSE BLD-MCNC: 89 MG/DL (ref 70–99)
GLUCOSE URINE: NEGATIVE
INR BLD: 1.1
KETONES, URINE: NEGATIVE
LEUKOCYTE ESTERASE, URINE: ABNORMAL
MICROALBUMIN/CREAT 24H UR: 108 MG/L
MICROALBUMIN/CREAT UR-RTO: 101 MCG/MG CREAT
MUCUS: ABNORMAL
NITRITE, URINE: NEGATIVE
OTHER OBSERVATIONS UA: ABNORMAL
PARTIAL THROMBOPLASTIN TIME: 31.6 SEC (ref 24–36)
PH UA: 6 (ref 5–8)
POTASSIUM SERPL-SCNC: 5.3 MMOL/L (ref 3.7–5.3)
PROTEIN UA: ABNORMAL
PROTHROMBIN TIME: 14.6 SEC (ref 11.8–14.6)
RBC UA: ABNORMAL /HPF
RENAL EPITHELIAL, UA: ABNORMAL /HPF
SODIUM BLD-SCNC: 141 MMOL/L (ref 135–144)
SODIUM,UR: 130 MMOL/L
SPECIFIC GRAVITY UA: 1.01 (ref 1–1.03)
TRICHOMONAS: ABNORMAL
TURBIDITY: ABNORMAL
URINE HGB: ABNORMAL
UROBILINOGEN, URINE: NORMAL
WBC UA: ABNORMAL /HPF
YEAST: ABNORMAL

## 2019-05-21 PROCEDURE — 94761 N-INVAS EAR/PLS OXIMETRY MLT: CPT

## 2019-05-21 PROCEDURE — 87086 URINE CULTURE/COLONY COUNT: CPT

## 2019-05-21 PROCEDURE — 2500000003 HC RX 250 WO HCPCS: Performed by: FAMILY MEDICINE

## 2019-05-21 PROCEDURE — 96372 THER/PROPH/DIAG INJ SC/IM: CPT

## 2019-05-21 PROCEDURE — 80048 BASIC METABOLIC PNL TOTAL CA: CPT

## 2019-05-21 PROCEDURE — 6370000000 HC RX 637 (ALT 250 FOR IP): Performed by: FAMILY MEDICINE

## 2019-05-21 PROCEDURE — 96374 THER/PROPH/DIAG INJ IV PUSH: CPT

## 2019-05-21 PROCEDURE — 6360000002 HC RX W HCPCS: Performed by: FAMILY MEDICINE

## 2019-05-21 PROCEDURE — 87077 CULTURE AEROBIC IDENTIFY: CPT

## 2019-05-21 PROCEDURE — 82570 ASSAY OF URINE CREATININE: CPT

## 2019-05-21 PROCEDURE — 87205 SMEAR GRAM STAIN: CPT

## 2019-05-21 PROCEDURE — 85730 THROMBOPLASTIN TIME PARTIAL: CPT

## 2019-05-21 PROCEDURE — G0378 HOSPITAL OBSERVATION PER HR: HCPCS

## 2019-05-21 PROCEDURE — G0379 DIRECT REFER HOSPITAL OBSERV: HCPCS

## 2019-05-21 PROCEDURE — 85610 PROTHROMBIN TIME: CPT

## 2019-05-21 PROCEDURE — 84300 ASSAY OF URINE SODIUM: CPT

## 2019-05-21 PROCEDURE — 94640 AIRWAY INHALATION TREATMENT: CPT

## 2019-05-21 PROCEDURE — 82043 UR ALBUMIN QUANTITATIVE: CPT

## 2019-05-21 PROCEDURE — 87186 SC STD MICRODIL/AGAR DIL: CPT

## 2019-05-21 PROCEDURE — 36415 COLL VENOUS BLD VENIPUNCTURE: CPT

## 2019-05-21 PROCEDURE — 74018 RADEX ABDOMEN 1 VIEW: CPT

## 2019-05-21 PROCEDURE — 81001 URINALYSIS AUTO W/SCOPE: CPT

## 2019-05-21 RX ORDER — DICLOFENAC SODIUM 75 MG/1
75 TABLET, DELAYED RELEASE ORAL 2 TIMES DAILY
Status: DISCONTINUED | OUTPATIENT
Start: 2019-05-21 | End: 2019-05-22

## 2019-05-21 RX ORDER — IBUPROFEN 600 MG/1
600 TABLET ORAL
Status: DISCONTINUED | OUTPATIENT
Start: 2019-05-21 | End: 2019-05-21

## 2019-05-21 RX ORDER — HEPARIN SODIUM 5000 [USP'U]/ML
5000 INJECTION, SOLUTION INTRAVENOUS; SUBCUTANEOUS 2 TIMES DAILY
Status: DISCONTINUED | OUTPATIENT
Start: 2019-05-21 | End: 2019-05-25 | Stop reason: HOSPADM

## 2019-05-21 RX ORDER — FERROUS SULFATE 325(65) MG
325 TABLET ORAL
Status: DISCONTINUED | OUTPATIENT
Start: 2019-05-22 | End: 2019-05-25 | Stop reason: HOSPADM

## 2019-05-21 RX ORDER — DICLOFENAC SODIUM 75 MG/1
75 TABLET, DELAYED RELEASE ORAL 2 TIMES DAILY
Status: DISCONTINUED | OUTPATIENT
Start: 2019-05-21 | End: 2019-05-21

## 2019-05-21 RX ORDER — BUMETANIDE 0.25 MG/ML
2 INJECTION, SOLUTION INTRAMUSCULAR; INTRAVENOUS 2 TIMES DAILY
Status: DISCONTINUED | OUTPATIENT
Start: 2019-05-21 | End: 2019-05-22

## 2019-05-21 RX ORDER — ALBUTEROL SULFATE 2.5 MG/3ML
2.5 SOLUTION RESPIRATORY (INHALATION) 4 TIMES DAILY
Status: DISCONTINUED | OUTPATIENT
Start: 2019-05-21 | End: 2019-05-25 | Stop reason: HOSPADM

## 2019-05-21 RX ORDER — FUROSEMIDE 40 MG/1
40 TABLET ORAL DAILY
Status: DISCONTINUED | OUTPATIENT
Start: 2019-05-21 | End: 2019-05-21

## 2019-05-21 RX ADMIN — HEPARIN SODIUM 5000 UNITS: 5000 INJECTION INTRAVENOUS; SUBCUTANEOUS at 21:59

## 2019-05-21 RX ADMIN — ALBUTEROL SULFATE 2.5 MG: 2.5 SOLUTION RESPIRATORY (INHALATION) at 15:31

## 2019-05-21 RX ADMIN — ALBUTEROL SULFATE 2.5 MG: 2.5 SOLUTION RESPIRATORY (INHALATION) at 20:59

## 2019-05-21 RX ADMIN — DICLOFENAC SODIUM 75 MG: 75 TABLET, DELAYED RELEASE ORAL at 22:00

## 2019-05-21 RX ADMIN — BUMETANIDE 2 MG: 0.25 INJECTION INTRAMUSCULAR; INTRAVENOUS at 22:00

## 2019-05-21 ASSESSMENT — PAIN SCALES - GENERAL: PAINLEVEL_OUTOF10: 5

## 2019-05-21 NOTE — CARE COORDINATION
CASE MANAGEMENT NOTE:    Admission Date:  5/21/2019 Faby Jenkins is a 70 y.o.  female    Admitted for : Anasarca [R60.1]    Met with:  Patient and Family    PCP:  Dr. Lainez Sos:  Medicare and MMO      Current Residence/ Living Arrangements:  independently at home with sister and drives            Current Services PTA:  Not currently, but has used [facility] in the past.    Is patient agreeable to [facility]: Yes    Freedom of choice provided: Yes    List of [address] provided: Yes    [facility] chosen:  [place] [address]    DME:  straight cane and walker    Home Oxygen: No    Nebulizer: Yes    CPAP/BIPAP: NA    Supplier: N/A    Potential Assistance Needed: [facility]    SNF needed: No    Pharmacy:  [facility] [address]       Is the Patient an [employer] with Readmission Risk Score greater than 14%? No  If yes, pt needs a follow up appointment made within 7 days. Does Patient want to use MEDS to BEDS? No    Family Members/Caregivers that pt would like involved in their care:    Yes    If yes, list name here:  Jewell    Transportation Provider:  Patient and Family             Is patient in Isolation/One on One/Altered Mental Status? No  If yes, skip next question. If no, would they like an I-Pad to  use? No  If yes, call [phone]. Discharge Plan:  Home with [facility]. Notified Jose David Phillips from [place] [address] to follow.       Electronically signed by: Bari Warren RN on 5/21/2019 at 4:19 PM

## 2019-05-21 NOTE — CARE COORDINATION
[address]  DVT Prophylaxis and Vaccine Status  Work List  Mandatory for all patients      Patient must be on both Chemical prophylaxis and Mechanical prophylaxis.  If chemical/mechanical prophylaxis is not ordered, the physician must document a reason for not using prophylaxis     Chemical Prophylaxis  Is patient on chemical prophylaxis: Yes  If no chemical prophylaxis Is a order in for No Chemical VTE prophylaxisNo  If no was the physician notified not applicable      Mechanical Prophylaxis  Is patient on mechanical prophylaxis, intermittent pneumatic compression device: Yes  If no was the physician notified not applicable        Pneumonia Vaccine  Vaccine indicated:  Up-to-date  If indicated was the vaccine given: not applicable    Influenza Vaccine (applicable from October through March):  Vaccine indicated: Up-to-date  If indicated was the vaccine given: not applicable    Patient Education  Education completed on DVT prophylaxis: yes

## 2019-05-21 NOTE — DISCHARGE INSTR - COC
Continuity of Care Form    Patient Name: Rufina Lawler   :  1955  MRN:  [id number]    Admit date:  2019  Discharge date:  2019    Code Status Order: Full Code   Advance Directives:   Advance Care Flowsheet Documentation     Date/Time Healthcare Directive Type of Healthcare Directive Copy in [address] Agent's Name Healthcare Agent's Phone Number    19 1355  No, patient does not have an advance directive for healthcare treatment -- -- -- -- --          Admitting Physician:  Aurelia Vuong MD  PCP: Aurelia Vuong MD    Discharging Nurse: [facility] Unit/Room#: 4736/0613-58  Discharging Unit Phone Number: [phone]    Emergency Contact:   Extended Emergency Contact Information  Primary Emergency Contact: [address] [facility] Phone: [phone]  Work Phone: [phone]  Relation: Brother/Sister  Secondary Emergency Contact: Alistair Lanier  Address: [address] [place] [address] Phone: [phone]  Mobile Phone: [phone]  Relation: Child    Past Surgical History:  Past Surgical History:   Procedure Laterality Date    CARPAL TUNNEL RELEASE Right 2014    CARPAL TUNNEL RELEASE Left 09/10/2014     SECTION      X2    COLON SURGERY      ATTEMPTED TO REVERSE COLOSTOMY UNSUCCESSFUL    COLOSTOMY      GASTRIC BYPASS SURGERY      TONSILLECTOMY         Immunization History: There is no immunization history on file for this patient. Active Problems:  Patient Active Problem List   Diagnosis Code    Asthmatic bronchitis with acute exacerbation J45. 0    Primary osteoarthritis of both knees M17.0    Primary cough headache G44.83    SBO (small bowel obstruction) ([facility]) K56.609    Ventral hernia K43.9    Morbid obesity due to excess calories ([facility]) E66.01    Essential hypertension I10    Intermittent asthma J45.20       Isolation/Infection:   Isolation          No Isolation Nurse Assessment:  Last Vital Signs: /61   Pulse 57   Temp 97.1 °F (36.2 °C) (Oral)   Resp 14   Ht 5' 7\" (1.702 m)   Wt (!) 374 lb 12.5 oz (170 kg)   SpO2 97%   BMI 58.70 kg/m²     Last documented pain score (0-10 scale):    Last Weight:   Wt Readings from Last 1 Encounters:   05/21/19 (!) 374 lb 12.5 oz (170 kg)     Mental Status:  oriented, alert, coherent, logical and thought processes intact    IV Access:  - None    Nursing Mobility/ADLs:  Walking   Assisted  Transfer  Assisted  Bathing  Assisted  Dressing  Assisted  Toileting  Independent  Feeding  [address]  Independent  Med Delivery   whole    Wound Care Documentation and Therapy:        Elimination:  Continence:   · Bowel: Yes  · Bladder: Yes  Urinary Catheter: None   Colostomy/Ileostomy/Ileal Conduit: Yes       Date of Last BM: 5/25/2019  No intake or output data in the 24 hours ending 05/21/19 1618  No intake/output data recorded. Safety Concerns:     None and At Risk for Falls    Impairments/Disabilities:      None    Nutrition Therapy:  Current Nutrition Therapy:   - Oral Diet:  General    Routes of Feeding: Oral  Liquids: No Restrictions  Daily Fluid Restriction: no  Last Modified Barium Swallow with Video (Video Swallowing Test): not done    Treatments at the Time of Hospital Discharge:   Respiratory Treatments: Albuterol   Oxygen Therapy:  is not on home oxygen therapy. Ventilator:    - No ventilator support    Rehab Therapies: Physical Therapy and Occupational Therapy  Weight Bearing Status/Restrictions: No weight bearing restirctions  Other Medical Equipment (for information only, NOT a DME order):  Cane, walker. Other Treatments: skilled nursing assessment, medication education and monitoring, dietician to see and monitor. Home health care agency's  to evaluate patient two weeks prior to discharge from home health to determine post-discharge services.      Patient's personal belongings (please select all that are sent with patient):  None    RN SIGNATURE:  Electronically signed by Estefany Morales RN on 5/25/19 at 5:49 PM    CASE MANAGEMENT/SOCIAL WORK SECTION    Inpatient Status Date: 5/22/19    Readmission Risk Assessment Score:  Readmission Risk              Risk of Unplanned Readmission:        5           Discharging to Facility/ [address] [facility]  Phone [phone]  · Fax [phone]  · Home health care agency's  to evaluate patient two weeks prior to discharge from home health to determine post-discharge services. / signature: Electronically signed by Shannan Caputo RN on 5/21/19 at 4:19 PM    PHYSICIAN SECTION    Prognosis: Good    Condition at Discharge: Stable    Rehab Potential (if transferring to Rehab): Good    Recommended Labs or Other Treatments After Discharge: See above    Physician Certification: I certify the above information and transfer of Elise Aponte  is necessary for the continuing treatment of the diagnosis listed and that she requires Home Care for less 30 days.      Update Admission H&P: No change in H&P    PHYSICIAN SIGNATURE:  Electronically signed by Sudhakar Schaefer MD on 5/25/19 at 5:34 PM

## 2019-05-21 NOTE — FLOWSHEET NOTE
Patient asked  to contact [facility]. Completed. No needs expressed. Chaplains will remain available to offer spiritual and emotional support as needed.      05/21/19 1607   Encounter Summary   Services provided to: Patient   Referral/Consult From: [address] [place] [address] Street Children;Family members   Place [facility] Completed   Continue Visiting   (5/21/19)   Complexity of Encounter Low   Length of Encounter 15 minutes   Spiritual Assessment Completed Yes   Routine   Type Initial   Assessment Approachable;Calm   Intervention Active listening;Explored feelings, thoughts, concerns;Prayer;Sustaining presence/ Ministry of presence   Outcome Expressed gratitude

## 2019-05-21 NOTE — CONSULTS
medications    Medication Sig Start Date End Date Taking? Authorizing Provider   omeprazole (PRILOSEC) 20 MG delayed release capsule Take 20 mg by mouth daily   Yes Historical Provider, MD   albuterol (PROVENTIL) (2.5 MG/3ML) 0.083% nebulizer solution Take 3 mLs by nebulization 4 times daily 4/4/17  Yes Lelo Gan MD   furosemide (LASIX) 20 MG tablet Take 40 mg by mouth daily   Yes Historical Provider, MD   ferrous sulfate 325 (65 FE) MG tablet Take 325 mg by mouth daily (with breakfast)   Yes Historical Provider, MD   sertraline (ZOLOFT) 25 MG tablet Take 25 mg by mouth nightly   Yes Historical Provider, MD   diclofenac (VOLTAREN) 75 MG EC tablet 1 tablet 2 times daily.  4/9/14  Yes Historical Provider, MD   esomeprazole Magnesium (NEXIUM) 20 MG PACK Take 20 mg by mouth daily    Historical Provider, MD   misoprostol (CYTOTEC) 200 MCG tablet Take 1 tablet by mouth daily  4/9/14   Historical Provider, MD       Scheduled Meds:   bumetanide  2 mg Intravenous BID    heparin (porcine)  5,000 Units Subcutaneous BID    [START ON 5/22/2019] ferrous sulfate  325 mg Oral Daily with breakfast    sertraline  25 mg Oral Nightly    albuterol  2.5 mg Nebulization 4x Daily     Continuous Infusions:  PRN Meds:    Allergies   Allergen Reactions    Shellfish-Derived Products      Hives, nausea       Social History     Socioeconomic History    Marital status:      Spouse name: Not on file    Number of children: Not on file    Years of education: Not on file    Highest education level: Not on file   Occupational History    Not on file   Social Needs    Financial resource strain: Not on file    Food insecurity:     Worry: Not on file     Inability: Not on file    Transportation needs:     Medical: Not on file     Non-medical: Not on file   Tobacco Use    Smoking status: Former Smoker    Smokeless tobacco: Never Used    Tobacco comment: QUIT SMOKING 1 YR   Substance and Sexual Activity    Alcohol use: No    Drug use: No    Sexual activity: Not on file   Lifestyle    Physical activity:     Days per week: Not on file     Minutes per session: Not on file    Stress: Not on file   Relationships    Social connections:     Talks on phone: Not on file     Gets together: Not on file     Attends Taoist service: Not on file     Active member of club or organization: Not on file     Attends meetings of clubs or organizations: Not on file     Relationship status: Not on file    Intimate partner violence:     Fear of current or ex partner: Not on file     Emotionally abused: Not on file     Physically abused: Not on file     Forced sexual activity: Not on file   Other Topics Concern    Not on file   Social History Narrative    Not on file       No family history on file. Physical Exam:  Vitals:    05/21/19 1345 05/21/19 1400 05/21/19 1531   BP:  108/61    Pulse:  57    Resp:  12 14   Temp:  97.1 °F (36.2 °C)    TempSrc:  Oral    SpO2:  99% 97%   Weight: (!) 374 lb 12.5 oz (170 kg)     Height: 5' 7\" (1.702 m)       No intake/output data recorded. General:  Awake, alert, not in distress. Appears to be stated age. HEENT: Atraumatic, normocephalic. Anicteric sclera. Pink and moist oral mucosa. No carotid bruit. No JVD. Chest: Bilateral air entry, clear to auscultation, no wheezing, rhonchi or rales. Cardiovascular: RRR, S1S2, no murmur, rub or gallop. Has lower extremity edema. Abdomen: Soft, non tender to palpation. Active bowel sounds x 4 quadrants. Musculoskeletal: Active ROM x 4 extremities. No cyanosis or clubbing. Integumentary: Pink, warm and dry. Free from rash or lesions. Skin turgor normal.  CNS: Oriented to person, place and time. Speech clear. Face symmetrical. No tremor.      Data:  CBC:   Lab Results   Component Value Date    WBC 6.5 05/14/2019    HGB 11.4 (L) 05/14/2019    HCT 39.3 05/14/2019    MCV 98.0 05/14/2019     05/14/2019     BMP:    Lab Results   Component Value Date     dietary non discretion. 3. Hyperkalemia, secondary to MIGUE. 4. Anemia. Plan:  1. Agree with iv Bumex as ordered. 2. Comprehensive urine testing including urinalysis, urine sodium, creatinine (FENa), eosinophils and urine protein and creatinine to quantify any proteinuria will be ordered. 3. Renal diet with oral fluid restriction of 1200 ml/24 hours. 4. Avoid hypotension, nephrotoxic drugs, NSAIDS, Lovenox, Fleets enema and IV contrast exposure. 5. Medications adjusted for low GFR. 6. Follow up chemistries ordered for later today and for AM.    Thank you for the consultation. Please do not hesitate to contact us for any further questions/concerns. We will continue to follow along with you. Electronically signed by Ludivina Herndon MD  on 5/21/2019 at 4:20 PM   [facility].   Ph: [phone]

## 2019-05-22 LAB
ABSOLUTE EOS #: 0.2 K/UL (ref 0–0.4)
ABSOLUTE IMMATURE GRANULOCYTE: ABNORMAL K/UL (ref 0–0.3)
ABSOLUTE LYMPH #: 1.4 K/UL (ref 1–4.8)
ABSOLUTE MONO #: 0.3 K/UL (ref 0.1–1.3)
ALBUMIN SERPL-MCNC: 3.6 G/DL (ref 3.5–5.2)
ALBUMIN/GLOBULIN RATIO: ABNORMAL (ref 1–2.5)
ALP BLD-CCNC: 342 U/L (ref 35–104)
ALT SERPL-CCNC: 13 U/L (ref 5–33)
ANION GAP SERPL CALCULATED.3IONS-SCNC: 13 MMOL/L (ref 9–17)
AST SERPL-CCNC: 20 U/L
BASOPHILS # BLD: 1 % (ref 0–2)
BASOPHILS ABSOLUTE: 0 K/UL (ref 0–0.2)
BILIRUB SERPL-MCNC: 0.67 MG/DL (ref 0.3–1.2)
BUN BLDV-MCNC: 36 MG/DL (ref 8–23)
BUN/CREAT BLD: ABNORMAL (ref 9–20)
CALCIUM SERPL-MCNC: 6.4 MG/DL (ref 8.6–10.4)
CHLORIDE BLD-SCNC: 113 MMOL/L (ref 98–107)
CO2: 16 MMOL/L (ref 20–31)
CREAT SERPL-MCNC: 1.78 MG/DL (ref 0.5–0.9)
DIFFERENTIAL TYPE: ABNORMAL
EOSINOPHILS RELATIVE PERCENT: 5 % (ref 0–4)
GFR AFRICAN AMERICAN: 35 ML/MIN
GFR NON-AFRICAN AMERICAN: 29 ML/MIN
GFR SERPL CREATININE-BSD FRML MDRD: ABNORMAL ML/MIN/{1.73_M2}
GFR SERPL CREATININE-BSD FRML MDRD: ABNORMAL ML/MIN/{1.73_M2}
GLUCOSE BLD-MCNC: 95 MG/DL (ref 70–99)
HCT VFR BLD CALC: 32.7 % (ref 36–46)
HEMOGLOBIN: 10.7 G/DL (ref 12–16)
IMMATURE GRANULOCYTES: ABNORMAL %
LYMPHOCYTES # BLD: 29 % (ref 24–44)
MAGNESIUM: 2.5 MG/DL (ref 1.6–2.6)
MCH RBC QN AUTO: 30.1 PG (ref 26–34)
MCHC RBC AUTO-ENTMCNC: 32.7 G/DL (ref 31–37)
MCV RBC AUTO: 92.1 FL (ref 80–100)
MONOCYTES # BLD: 7 % (ref 1–7)
NRBC AUTOMATED: ABNORMAL PER 100 WBC
PDW BLD-RTO: 15.3 % (ref 11.5–14.9)
PHOSPHORUS: 4.5 MG/DL (ref 2.6–4.5)
PLATELET # BLD: 208 K/UL (ref 150–450)
PLATELET ESTIMATE: ABNORMAL
PMV BLD AUTO: 7.3 FL (ref 6–12)
POTASSIUM SERPL-SCNC: 5 MMOL/L (ref 3.7–5.3)
RBC # BLD: 3.55 M/UL (ref 4–5.2)
RBC # BLD: ABNORMAL 10*6/UL
SEG NEUTROPHILS: 58 % (ref 36–66)
SEGMENTED NEUTROPHILS ABSOLUTE COUNT: 2.8 K/UL (ref 1.3–9.1)
SODIUM BLD-SCNC: 142 MMOL/L (ref 135–144)
TOTAL PROTEIN: 6.4 G/DL (ref 6.4–8.3)
WBC # BLD: 4.7 K/UL (ref 3.5–11)
WBC # BLD: ABNORMAL 10*3/UL

## 2019-05-22 PROCEDURE — 96372 THER/PROPH/DIAG INJ SC/IM: CPT

## 2019-05-22 PROCEDURE — 6360000002 HC RX W HCPCS: Performed by: INTERNAL MEDICINE

## 2019-05-22 PROCEDURE — 96376 TX/PRO/DX INJ SAME DRUG ADON: CPT

## 2019-05-22 PROCEDURE — 84100 ASSAY OF PHOSPHORUS: CPT

## 2019-05-22 PROCEDURE — 1200000000 HC SEMI PRIVATE

## 2019-05-22 PROCEDURE — 6360000002 HC RX W HCPCS: Performed by: FAMILY MEDICINE

## 2019-05-22 PROCEDURE — 94640 AIRWAY INHALATION TREATMENT: CPT

## 2019-05-22 PROCEDURE — 83735 ASSAY OF MAGNESIUM: CPT

## 2019-05-22 PROCEDURE — 2580000003 HC RX 258: Performed by: INTERNAL MEDICINE

## 2019-05-22 PROCEDURE — 6370000000 HC RX 637 (ALT 250 FOR IP): Performed by: FAMILY MEDICINE

## 2019-05-22 PROCEDURE — 80053 COMPREHEN METABOLIC PANEL: CPT

## 2019-05-22 PROCEDURE — 2500000003 HC RX 250 WO HCPCS: Performed by: FAMILY MEDICINE

## 2019-05-22 PROCEDURE — 36415 COLL VENOUS BLD VENIPUNCTURE: CPT

## 2019-05-22 PROCEDURE — 85025 COMPLETE CBC W/AUTO DIFF WBC: CPT

## 2019-05-22 RX ADMIN — Medication 325 MG: at 10:07

## 2019-05-22 RX ADMIN — ALBUTEROL SULFATE 2.5 MG: 2.5 SOLUTION RESPIRATORY (INHALATION) at 21:19

## 2019-05-22 RX ADMIN — HEPARIN SODIUM 5000 UNITS: 5000 INJECTION INTRAVENOUS; SUBCUTANEOUS at 21:57

## 2019-05-22 RX ADMIN — CALCIUM GLUCONATE 2 G: 98 INJECTION, SOLUTION INTRAVENOUS at 18:18

## 2019-05-22 RX ADMIN — ALBUTEROL SULFATE 2.5 MG: 2.5 SOLUTION RESPIRATORY (INHALATION) at 07:22

## 2019-05-22 RX ADMIN — ALBUTEROL SULFATE 2.5 MG: 2.5 SOLUTION RESPIRATORY (INHALATION) at 11:09

## 2019-05-22 RX ADMIN — SERTRALINE HYDROCHLORIDE 25 MG: 50 TABLET ORAL at 21:53

## 2019-05-22 RX ADMIN — BUMETANIDE 2 MG: 0.25 INJECTION INTRAMUSCULAR; INTRAVENOUS at 10:04

## 2019-05-22 RX ADMIN — ALBUTEROL SULFATE 2.5 MG: 2.5 SOLUTION RESPIRATORY (INHALATION) at 16:04

## 2019-05-22 RX ADMIN — HEPARIN SODIUM 5000 UNITS: 5000 INJECTION INTRAVENOUS; SUBCUTANEOUS at 10:05

## 2019-05-22 NOTE — CONSULTS
Reason for Consult:  Acute kidney injury. Interval History:    F/U MIGUE  Cr up to 1.7 today  C/O legs cramping  Received diclofenac yesterday  Denies dyspnea at rest  BP OK  K improved  Calcium still low    HISTORY OF PRESENT ILLNESS:    The patient is a 70 y.o. female who presents with generalized swelling that has worsened over weeks. On previous labs in 2017 her serum creatinines have been between 0.7 to 0.8 with eGFR of >60s ml/min. Prior to admission her creatinine was noted to be elevated at 1.59 with potassium level of 5.2 on . Denies any problems with nausea, vomiting, appetite, diarrhea or difficulty with urination. Denies any recent use of iv contrast. Noted to be on Voltaren prior to admission. Review Of Systems:   Constitutional: No fever, chills, lethargy, weakness or wt loss. HEENT:  No headache, nasal discharge or sore throat. Cardiac:  No chest pain, dyspnea, orthopnea or PND. Chest:              No cough, phlegm or wheezing. Abdomen:  No abdominal pain, nausea, vomiting or diarrhea. Neuro:   No gross focal weakness, numbness, abnormal movements or seizure                         like activity. Skin:   No rashes or itching. :   No hematuria, pyuria, dysuria or flank pain. Extremities:  Co swelling no joint pains. Endocrine: No polyuria, polydypsia, or thyroid problems. Hematology:    No bleeding disorders, bruising or anemia. All other ROS is negative.       Past Medical History:   Diagnosis Date    Carpal tunnel syndrome     Diverticulitis     Full dentures     doesnt wear    Hypertension     Numbness     left wrist    Pain in left wrist     rate 8    TIA (transient ischemic attack)     2013    Wears glasses     Wears glasses     Wears partial dentures        Past Surgical History:   Procedure Laterality Date    CARPAL TUNNEL RELEASE Right 2014    CARPAL TUNNEL RELEASE Left 09/10/2014     SECTION      X2    COLON SURGERY      ATTEMPTED TO REVERSE COLOSTOMY UNSUCCESSFUL    COLOSTOMY      GASTRIC BYPASS SURGERY      TONSILLECTOMY         Prior to Admission medications    Medication Sig Start Date End Date Taking? Authorizing Provider   omeprazole (PRILOSEC) 20 MG delayed release capsule Take 20 mg by mouth daily   Yes Historical Provider, MD   albuterol (PROVENTIL) (2.5 MG/3ML) 0.083% nebulizer solution Take 3 mLs by nebulization 4 times daily 4/4/17  Yes Gerardine Emperor, MD   furosemide (LASIX) 20 MG tablet Take 40 mg by mouth daily   Yes Historical Provider, MD   ferrous sulfate 325 (65 FE) MG tablet Take 325 mg by mouth daily (with breakfast)   Yes Historical Provider, MD   sertraline (ZOLOFT) 25 MG tablet Take 25 mg by mouth nightly   Yes Historical Provider, MD   diclofenac (VOLTAREN) 75 MG EC tablet 1 tablet 2 times daily.  4/9/14  Yes Historical Provider, MD   esomeprazole Magnesium (NEXIUM) 20 MG PACK Take 20 mg by mouth daily    Historical Provider, MD   misoprostol (CYTOTEC) 200 MCG tablet Take 1 tablet by mouth daily  4/9/14   Historical Provider, MD       Scheduled Meds:   calcium gluconate IVPB  2 g Intravenous Once    heparin (porcine)  5,000 Units Subcutaneous BID    ferrous sulfate  325 mg Oral Daily with breakfast    sertraline  25 mg Oral Nightly    albuterol  2.5 mg Nebulization 4x Daily     Continuous Infusions:  PRN Meds:    Allergies   Allergen Reactions    Shellfish-Derived Products      Hives, nausea       Social History     Socioeconomic History    Marital status:      Spouse name: Not on file    Number of children: Not on file    Years of education: Not on file    Highest education level: Not on file   Occupational History    Not on file   Social Needs    Financial resource strain: Not on file    Food insecurity:     Worry: Not on file     Inability: Not on file    Transportation needs:     Medical: Not on file     Non-medical: Not on file   Tobacco Use    Smoking status: Former Smoker    Smokeless tobacco: Lab Results   Component Value Date    WBC 4.7 05/22/2019    HGB 10.7 (L) 05/22/2019    HCT 32.7 (L) 05/22/2019    MCV 92.1 05/22/2019     05/22/2019     BMP:    Lab Results   Component Value Date     05/22/2019     05/21/2019     05/14/2019    K 5.0 05/22/2019    K 5.3 05/21/2019    K 5.4 (H) 05/14/2019     (H) 05/22/2019     (H) 05/21/2019     (H) 05/14/2019    CO2 16 (L) 05/22/2019    CO2 15 (L) 05/21/2019    CO2 13 (L) 05/14/2019    BUN 36 (H) 05/22/2019    BUN 36 (H) 05/21/2019    BUN 31 (H) 05/14/2019    CREATININE 1.78 (H) 05/22/2019    CREATININE 1.52 (H) 05/21/2019    CREATININE 1.59 (H) 05/14/2019    GLUCOSE 95 05/22/2019    GLUCOSE 89 05/21/2019    GLUCOSE 64 (L) 05/14/2019     CMP:   Lab Results   Component Value Date     05/22/2019    K 5.0 05/22/2019     05/22/2019    CO2 16 05/22/2019    BUN 36 05/22/2019    CREATININE 1.78 05/22/2019    GLUCOSE 95 05/22/2019    CALCIUM 6.4 05/22/2019    PROT 6.4 05/22/2019    LABALBU 3.6 05/22/2019    BILITOT 0.67 05/22/2019    ALKPHOS 342 05/22/2019    AST 20 05/22/2019    ALT 13 05/22/2019      Hepatic:   Lab Results   Component Value Date    AST 20 05/22/2019    AST 20 05/14/2019    AST 24 06/26/2018    ALT 13 05/22/2019    ALT 16 05/14/2019    ALT 17 06/26/2018    BILITOT 0.67 05/22/2019    BILITOT 0.63 05/14/2019    BILITOT 0.83 06/26/2018    ALKPHOS 342 (H) 05/22/2019    ALKPHOS 399 (H) 05/14/2019    ALKPHOS 453 (H) 06/26/2018     BNP: No results found for: BNP  Lipids: No results found for: CHOL, HDL  INR:   Lab Results   Component Value Date    INR 1.1 05/21/2019    INR 0.9 05/01/2017    INR 1.0 07/27/2013     PTH: No results found for: PTH  Phosphorus:    Lab Results   Component Value Date    PHOS 4.5 05/22/2019     Ionized Calcium: No results found for: IONCA  Magnesium:   Lab Results   Component Value Date    MG 2.5 05/22/2019     Albumin:   Lab Results   Component Value Date    LABALBU 3.6 05/22/2019 Last 3 CK, CKMB, Troponin: @LABRCNT(CKTOTAL:3,CKMB:3,TROPONINI:3)       URINE:)No results found for: Paco Barber     Radiology:   Reviewed. Assessment:  1. Acute kidney injury, multifactorial hemodynamically related, noted to be on NSAIDs. 2. Anasarca with dietary non discretion. 3. Hyperkalemia, secondary to MIGUE. 4. Anemia. 5. Hypocalcemia  6. Acidosis    Plan:  1. Stop bumex due to rising Cr and hypocalcemia. 2. 2g Ca gluconate IV today, check PTH, vit D, ionized calcium levels  3. D/C diclofenac, likely exacerbating her MIGUE   4. Avoid hypotension, nephrotoxic drugs, NSAIDS, Lovenox, Fleets enema and IV contrast exposure. 5. Medications adjusted for low GFR. 6. Follow up chemistries ordered for later today and for AM.  7. Hold off on replacing bicarb until calcium level is improved    Thank you for the consultation. Please do not hesitate to contact us for any further questions/concerns. We will continue to follow along with you. Electronically signed by Madan Barron MD  on 5/22/2019 at 3:08 PM   [facility].   Ph: [phone]

## 2019-05-22 NOTE — PLAN OF CARE
Nutrition Problem: Altered nutrition-related lab values  Intervention: Food and/or Nutrient Delivery: Continue current diet  Nutritional Goals: PO intakes are greater than 75% at meals

## 2019-05-22 NOTE — H&P
[facility]        History and Physical Examination     CHIEF COMPLAINT: Leg edema and inability to ambulate    Reason for Admission: Leg edema and trouble ambulating    History Obtained From:  Patient     HISTORY OF PRESENT ILLNESS:      The patient is a  70 y.o. female with significant medical history of Ch BLE Lymphedema, M Obesity,CKD2,HTN who has worsening of LE edema over the last 1 month to the point that she could not walk and was direct admit for diuresis and Nephro consult. Pt is diuresing well with IV Bumex, had an episode of wheezing this morning which improved with nebulizer. Past Medical History:        Diagnosis Date    Carpal tunnel syndrome     Diverticulitis     Full dentures     doesnt wear    Hypertension     Numbness     left wrist    Pain in left wrist     rate 8    TIA (transient ischemic attack)     2013    Wears glasses     Wears glasses     Wears partial dentures        Past Surgical History:        Procedure Laterality Date    CARPAL TUNNEL RELEASE Right 2014    CARPAL TUNNEL RELEASE Left 09/10/2014     SECTION      X2    COLON SURGERY      ATTEMPTED TO REVERSE COLOSTOMY UNSUCCESSFUL    COLOSTOMY      GASTRIC BYPASS SURGERY      TONSILLECTOMY         Medications Prior to Admission:    Medications Prior to Admission: omeprazole (PRILOSEC) 20 MG delayed release capsule, Take 20 mg by mouth daily  albuterol (PROVENTIL) (2.5 MG/3ML) 0.083% nebulizer solution, Take 3 mLs by nebulization 4 times daily  furosemide (LASIX) 20 MG tablet, Take 40 mg by mouth daily  ferrous sulfate 325 (65 FE) MG tablet, Take 325 mg by mouth daily (with breakfast)  sertraline (ZOLOFT) 25 MG tablet, Take 25 mg by mouth nightly  diclofenac (VOLTAREN) 75 MG EC tablet, 1 tablet 2 times daily.   esomeprazole Magnesium (NEXIUM) 20 MG PACK, Take 20 mg by mouth daily  misoprostol (CYTOTEC) 200 MCG tablet, Take 1 tablet by mouth daily     Allergies:  Shellfish-derived palpation  Skin - no gross lesions, rashes, or induration noted      DATA:  Hematology:  Recent Labs     05/21/19  1611 05/22/19  0714   WBC  --  4.7   HGB  --  10.7*   HCT  --  32.7*   PLT  --  208   INR 1.1  --      Chemistry:  Recent Labs     05/21/19  1720 05/22/19  0714    142   K 5.3 5.0   * 113*   CO2 15* 16*   GLUCOSE 89 95   BUN 36* 36*   CREATININE 1.52* 1.78*   MG  --  2.5   ANIONGAP 10 13   LABGLOM 34* 29*   GFRAA 42* 35*   CALCIUM 6.0* 6.4*   PHOS  --  4.5     Recent Labs     05/22/19  0714   PROT 6.4   LABALBU 3.6   AST 20   ALT 13   ALKPHOS 342*   BILITOT 0.67           ASSESSMENT:  · Lymphedema BLE  · MIGUE 2/2 diuresis  · HTN  · M Obesity,remote gastric bypass  · RAD/COPD  · OA B/L Knees  · Colostomy for severe diverticulosis  · Gastritis    PLAN:  · IV Bumex, ACE wraps BLE.     Electronically signed by Be Yuan MD on 5/22/2019 at 1:26 PM

## 2019-05-22 NOTE — PLAN OF CARE
Problem: Falls - Risk of:  Goal: Will remain free from falls  Description  Will remain free from falls  5/22/2019 0440 by Radha Tan RN  Outcome: Ongoing  Note:   Patient remained free from falls this shift. Call light and bed side table are within reach, bed is in lowest position, and side rails are up x2. Will continue to monitor.    5/21/2019 1639 by Eddie Mcneil RN  Outcome: Ongoing  Goal: Absence of physical injury  Description  Absence of physical injury  5/22/2019 0440 by Radha Tan RN  Outcome: Ongoing  5/21/2019 1639 by Eddie Mcneil RN  Outcome: Ongoing     Problem: SAFETY  Goal: Free from accidental physical injury  5/22/2019 0440 by Radha Tan RN  Outcome: Ongoing  5/21/2019 1639 by Eddie Mcneil RN  Outcome: Ongoing  Goal: Free from intentional harm  5/22/2019 0440 by Radha Tan RN  Outcome: Ongoing  5/21/2019 1639 by Eddie Mcneil RN  Outcome: Ongoing     Problem: DAILY CARE  Goal: Daily care needs are met  5/22/2019 0440 by Radha Tan RN  Outcome: Ongoing  5/21/2019 1639 by Eddie Mcneil RN  Outcome: Ongoing     Problem: PAIN  Goal: Patient's pain/discomfort is manageable  5/22/2019 0440 by Radha Tan RN  Outcome: Ongoing  5/21/2019 1639 by Eddie Mcneil RN  Outcome: Ongoing

## 2019-05-22 NOTE — PROGRESS NOTES
Nutrition Assessment    Type and Reason for Visit: Positive Nutrition Screen(Dietitian consult needed: diet education )    Nutrition Recommendations: Continue Renal diet and 1000 mL fluid restriction. Nutrition Assessment: Patient is nutritionally compromised as evidence by anasarca related to acute kidney failure. Patient was given handouts for fluid restriction and potassium content of food list. Patient has a good appetite and is eating fine at meals. Will monitor p.o intakes and labs. Malnutrition Assessment:  · Malnutrition Status: At risk for malnutrition  · Context: Acute illness or injury  · Findings of the 6 clinical characteristics of malnutrition (Minimum of 2 out of 6 clinical characteristics is required to make the diagnosis of moderate or severe Protein Calorie Malnutrition based on AND/ASPEN Guidelines):  1. Energy Intake-Less than or equal to 75% of estimated energy requirement, Greater than or equal to 7 days    2. Weight Loss-No significant weight loss,    3. Fat Loss-No significant subcutaneous fat loss,    4. Muscle Loss-No significant muscle mass loss,    5. Fluid Accumulation-No significant fluid accumulation, Extremities  6.  Strength-Not measured    Nutrition Risk Level: Moderate    Nutrient Needs:  · Estimated Daily Total Kcal: 3118-5633 kcal based on 9-11 kcal/ kg admission weight   · Estimated Daily Protein (g): 80-92 gm of protein based on 1.3-1.5 gm/ kg of ideal weight     Nutrition Diagnosis:   · Problem: Altered nutrition-related lab values  · Etiology: related to Renal dysfunction(Acute kidney injury)     Signs and symptoms:  as evidenced by Localized or generalized fluid accumulation, Lab values    Objective Information:  · Nutrition-Focused Physical Findings: +3 pitting BLE. Anasarca.  Colostomy  · Current Nutrition Therapies:  · Oral Diet Orders: Renal, Fluid Restriction(1000 mL)   · Oral Diet intake: 51-75%  · Anthropometric Measures:  · Ht: 5' 7\" (170.2 cm) · Current Body Wt: 374 lb (169.6 kg)  · Admission Body Wt: 374 lb (169.6 kg)  · Ideal Body Wt: 135 lb (61.2 kg), % Ideal Body 277%  · BMI Classification: BMI > or equal to 40.0 Obese Class III    Nutrition Interventions:   Continue current diet  Continued Inpatient Monitoring, Education Completed(Low potassium and fluid restriction handout given)    Nutrition Evaluation:   · Evaluation: Goals set   · Goals: PO intakes are greater than 75% at meals    · Monitoring: Weight, Pertinent Labs, Monitor Bowel Function, Diet Tolerance, Meal Intake, I&O, Skin Integrity      Jason Guallpa  [facility], R.D, L.D,  Clinical Dietitian  Cell # [phone]  Office # [phone]

## 2019-05-23 ENCOUNTER — APPOINTMENT (OUTPATIENT)
Dept: ULTRASOUND IMAGING | Age: 64
DRG: 607 | End: 2019-05-23
Attending: FAMILY MEDICINE
Payer: MEDICARE

## 2019-05-23 PROBLEM — N17.9 ACUTE KIDNEY INJURY (HCC): Status: ACTIVE | Noted: 2019-05-23

## 2019-05-23 LAB
ABSOLUTE EOS #: 0.2 K/UL (ref 0–0.4)
ABSOLUTE IMMATURE GRANULOCYTE: ABNORMAL K/UL (ref 0–0.3)
ABSOLUTE LYMPH #: 1.4 K/UL (ref 1–4.8)
ABSOLUTE MONO #: 0.3 K/UL (ref 0.1–1.3)
ALBUMIN SERPL-MCNC: 3.4 G/DL (ref 3.5–5.2)
ALBUMIN/GLOBULIN RATIO: ABNORMAL (ref 1–2.5)
ALP BLD-CCNC: 328 U/L (ref 35–104)
ALT SERPL-CCNC: 12 U/L (ref 5–33)
ANION GAP SERPL CALCULATED.3IONS-SCNC: 13 MMOL/L (ref 9–17)
AST SERPL-CCNC: 17 U/L
BASOPHILS # BLD: 0 % (ref 0–2)
BASOPHILS ABSOLUTE: 0 K/UL (ref 0–0.2)
BILIRUB SERPL-MCNC: 0.68 MG/DL (ref 0.3–1.2)
BUN BLDV-MCNC: 37 MG/DL (ref 8–23)
BUN/CREAT BLD: ABNORMAL (ref 9–20)
CALCIUM IONIZED: 0.92 MMOL/L (ref 1.13–1.33)
CALCIUM SERPL-MCNC: 6.6 MG/DL (ref 8.6–10.4)
CHLORIDE BLD-SCNC: 110 MMOL/L (ref 98–107)
CO2: 17 MMOL/L (ref 20–31)
CREAT SERPL-MCNC: 1.72 MG/DL (ref 0.5–0.9)
CULTURE: ABNORMAL
CULTURE: ABNORMAL
DIFFERENTIAL TYPE: ABNORMAL
EOSINOPHILS RELATIVE PERCENT: 4 % (ref 0–4)
GFR AFRICAN AMERICAN: 36 ML/MIN
GFR NON-AFRICAN AMERICAN: 30 ML/MIN
GFR SERPL CREATININE-BSD FRML MDRD: ABNORMAL ML/MIN/{1.73_M2}
GFR SERPL CREATININE-BSD FRML MDRD: ABNORMAL ML/MIN/{1.73_M2}
GLUCOSE BLD-MCNC: 91 MG/DL (ref 70–99)
HCT VFR BLD CALC: 32.3 % (ref 36–46)
HEMOGLOBIN: 10.4 G/DL (ref 12–16)
IMMATURE GRANULOCYTES: ABNORMAL %
LV EF: 55 %
LVEF MODALITY: NORMAL
LYMPHOCYTES # BLD: 30 % (ref 24–44)
Lab: ABNORMAL
MAGNESIUM: 2.3 MG/DL (ref 1.6–2.6)
MCH RBC QN AUTO: 29.4 PG (ref 26–34)
MCHC RBC AUTO-ENTMCNC: 32.1 G/DL (ref 31–37)
MCV RBC AUTO: 91.3 FL (ref 80–100)
MONOCYTES # BLD: 6 % (ref 1–7)
NRBC AUTOMATED: ABNORMAL PER 100 WBC
PDW BLD-RTO: 15.4 % (ref 11.5–14.9)
PHOSPHORUS: 4.6 MG/DL (ref 2.6–4.5)
PLATELET # BLD: 205 K/UL (ref 150–450)
PLATELET ESTIMATE: ABNORMAL
PMV BLD AUTO: 7.5 FL (ref 6–12)
POTASSIUM SERPL-SCNC: 5.2 MMOL/L (ref 3.7–5.3)
PTH INTACT: 621.5 PG/ML (ref 15–65)
RBC # BLD: 3.54 M/UL (ref 4–5.2)
RBC # BLD: ABNORMAL 10*6/UL
SEG NEUTROPHILS: 60 % (ref 36–66)
SEGMENTED NEUTROPHILS ABSOLUTE COUNT: 2.7 K/UL (ref 1.3–9.1)
SODIUM BLD-SCNC: 140 MMOL/L (ref 135–144)
SPECIMEN DESCRIPTION: ABNORMAL
TOTAL PROTEIN: 6 G/DL (ref 6.4–8.3)
WBC # BLD: 4.5 K/UL (ref 3.5–11)
WBC # BLD: ABNORMAL 10*3/UL

## 2019-05-23 PROCEDURE — 97162 PT EVAL MOD COMPLEX 30 MIN: CPT

## 2019-05-23 PROCEDURE — 1200000000 HC SEMI PRIVATE

## 2019-05-23 PROCEDURE — 83970 ASSAY OF PARATHORMONE: CPT

## 2019-05-23 PROCEDURE — 6360000004 HC RX CONTRAST MEDICATION: Performed by: INTERNAL MEDICINE

## 2019-05-23 PROCEDURE — 6370000000 HC RX 637 (ALT 250 FOR IP): Performed by: INTERNAL MEDICINE

## 2019-05-23 PROCEDURE — 6360000002 HC RX W HCPCS: Performed by: INTERNAL MEDICINE

## 2019-05-23 PROCEDURE — 94640 AIRWAY INHALATION TREATMENT: CPT

## 2019-05-23 PROCEDURE — 93306 TTE W/DOPPLER COMPLETE: CPT

## 2019-05-23 PROCEDURE — 84100 ASSAY OF PHOSPHORUS: CPT

## 2019-05-23 PROCEDURE — 6360000002 HC RX W HCPCS: Performed by: FAMILY MEDICINE

## 2019-05-23 PROCEDURE — 82330 ASSAY OF CALCIUM: CPT

## 2019-05-23 PROCEDURE — 6370000000 HC RX 637 (ALT 250 FOR IP): Performed by: FAMILY MEDICINE

## 2019-05-23 PROCEDURE — 97530 THERAPEUTIC ACTIVITIES: CPT

## 2019-05-23 PROCEDURE — 85025 COMPLETE CBC W/AUTO DIFF WBC: CPT

## 2019-05-23 PROCEDURE — 80053 COMPREHEN METABOLIC PANEL: CPT

## 2019-05-23 PROCEDURE — 36415 COLL VENOUS BLD VENIPUNCTURE: CPT

## 2019-05-23 PROCEDURE — 94761 N-INVAS EAR/PLS OXIMETRY MLT: CPT

## 2019-05-23 PROCEDURE — 76770 US EXAM ABDO BACK WALL COMP: CPT

## 2019-05-23 PROCEDURE — 83735 ASSAY OF MAGNESIUM: CPT

## 2019-05-23 PROCEDURE — 2580000003 HC RX 258: Performed by: INTERNAL MEDICINE

## 2019-05-23 RX ORDER — SODIUM BICARBONATE 650 MG/1
650 TABLET ORAL 2 TIMES DAILY
Status: DISCONTINUED | OUTPATIENT
Start: 2019-05-23 | End: 2019-05-25 | Stop reason: HOSPADM

## 2019-05-23 RX ORDER — PROMETHAZINE HYDROCHLORIDE 25 MG/1
12.5 TABLET ORAL EVERY 6 HOURS PRN
Status: DISCONTINUED | OUTPATIENT
Start: 2019-05-23 | End: 2019-05-25 | Stop reason: HOSPADM

## 2019-05-23 RX ORDER — CIPROFLOXACIN 500 MG/1
500 TABLET, FILM COATED ORAL 2 TIMES DAILY
Status: DISCONTINUED | OUTPATIENT
Start: 2019-05-23 | End: 2019-05-25 | Stop reason: HOSPADM

## 2019-05-23 RX ADMIN — HEPARIN SODIUM 5000 UNITS: 5000 INJECTION INTRAVENOUS; SUBCUTANEOUS at 08:27

## 2019-05-23 RX ADMIN — ALBUTEROL SULFATE 2.5 MG: 2.5 SOLUTION RESPIRATORY (INHALATION) at 11:21

## 2019-05-23 RX ADMIN — SODIUM BICARBONATE 650 MG: 650 TABLET ORAL at 21:38

## 2019-05-23 RX ADMIN — PERFLUTREN 2.2 MG: 6.52 INJECTION, SUSPENSION INTRAVENOUS at 12:40

## 2019-05-23 RX ADMIN — CIPROFLOXACIN HYDROCHLORIDE 500 MG: 500 TABLET, FILM COATED ORAL at 21:38

## 2019-05-23 RX ADMIN — ALBUTEROL SULFATE 2.5 MG: 2.5 SOLUTION RESPIRATORY (INHALATION) at 19:10

## 2019-05-23 RX ADMIN — ALBUTEROL SULFATE 2.5 MG: 2.5 SOLUTION RESPIRATORY (INHALATION) at 07:17

## 2019-05-23 RX ADMIN — SERTRALINE HYDROCHLORIDE 25 MG: 50 TABLET ORAL at 21:38

## 2019-05-23 RX ADMIN — Medication 325 MG: at 08:28

## 2019-05-23 RX ADMIN — CIPROFLOXACIN HYDROCHLORIDE 500 MG: 500 TABLET, FILM COATED ORAL at 12:04

## 2019-05-23 RX ADMIN — HEPARIN SODIUM 5000 UNITS: 5000 INJECTION INTRAVENOUS; SUBCUTANEOUS at 21:38

## 2019-05-23 RX ADMIN — CALCIUM GLUCONATE 3 G: 98 INJECTION, SOLUTION INTRAVENOUS at 12:04

## 2019-05-23 RX ADMIN — ALBUTEROL SULFATE 2.5 MG: 2.5 SOLUTION RESPIRATORY (INHALATION) at 15:54

## 2019-05-23 ASSESSMENT — PAIN SCALES - GENERAL: PAINLEVEL_OUTOF10: 0

## 2019-05-23 NOTE — PROGRESS NOTES
Reason for Consult:  Acute kidney injury. Interval History:    F/U MIGUE  Cr stable at 1.7  Good UOP  C/O legs cramping  BP OK  K stable  Calcium still low    HISTORY OF PRESENT ILLNESS:    The patient is a 70 y.o. female who presents with generalized swelling that has worsened over weeks. On previous labs in 2017 her serum creatinines have been between 0.7 to 0.8 with eGFR of >60s ml/min. Prior to admission her creatinine was noted to be elevated at 1.59 with potassium level of 5.2 on 5/14. Denies any problems with nausea, vomiting, appetite, diarrhea or difficulty with urination. Denies any recent use of iv contrast. Noted to be on Voltaren prior to admission. Prior to Admission medications    Medication Sig Start Date End Date Taking? Authorizing Provider   omeprazole (PRILOSEC) 20 MG delayed release capsule Take 20 mg by mouth daily   Yes Historical Provider, MD   albuterol (PROVENTIL) (2.5 MG/3ML) 0.083% nebulizer solution Take 3 mLs by nebulization 4 times daily 4/4/17  Yes Matt Bernard MD   furosemide (LASIX) 20 MG tablet Take 40 mg by mouth daily   Yes Historical Provider, MD   ferrous sulfate 325 (65 FE) MG tablet Take 325 mg by mouth daily (with breakfast)   Yes Historical Provider, MD   sertraline (ZOLOFT) 25 MG tablet Take 25 mg by mouth nightly   Yes Historical Provider, MD   diclofenac (VOLTAREN) 75 MG EC tablet 1 tablet 2 times daily.  4/9/14  Yes Historical Provider, MD   esomeprazole Magnesium (NEXIUM) 20 MG PACK Take 20 mg by mouth daily    Historical Provider, MD   misoprostol (CYTOTEC) 200 MCG tablet Take 1 tablet by mouth daily  4/9/14   Historical Provider, MD       Scheduled Meds:   heparin (porcine)  5,000 Units Subcutaneous BID    ferrous sulfate  325 mg Oral Daily with breakfast    sertraline  25 mg Oral Nightly    albuterol  2.5 mg Nebulization 4x Daily     Continuous Infusions:  PRN Meds:     Physical Exam:  Vitals:    05/22/19 1951 05/22/19 2119 05/23/19 0600 05/23/19 5750 BP: (!) 115/55   (!) 114/54   Pulse: 63   64   Resp: 16 16  16   Temp: 97.1 °F (36.2 °C)   98.4 °F (36.9 °C)   TempSrc: Oral   Oral   SpO2: 99% 98%  96%   Weight:   (!) 370 lb 9.5 oz (168.1 kg)    Height:         I/O last 3 completed shifts: In: 6581 [P.O.:1220]  Out: 5100 [Urine:5100]    General:  Awake, alert, not in distress. Appears to be stated age. HEENT: Atraumatic, normocephalic. Anicteric sclera. Pink and moist oral mucosa. No carotid bruit. No JVD. Chest: Bilateral air entry, clear to auscultation, no wheezing, rhonchi or rales. Cardiovascular: RRR, S1S2, no murmur, rub or gallop. Has mild lower extremity edema. Abdomen: Soft, non tender to palpation. Musculoskeletal: No cyanosis or clubbing. Integumentary: Pink, warm and dry. Free from rash or lesions. Skin turgor normal.  CNS: Oriented to person, place and time. Speech clear. Face symmetrical. No tremor.      Data:  CBC:   Lab Results   Component Value Date    WBC 4.5 05/23/2019    HGB 10.4 (L) 05/23/2019    HCT 32.3 (L) 05/23/2019    MCV 91.3 05/23/2019     05/23/2019     BMP:    Lab Results   Component Value Date     05/23/2019     05/22/2019     05/21/2019    K 5.2 05/23/2019    K 5.0 05/22/2019    K 5.3 05/21/2019     (H) 05/23/2019     (H) 05/22/2019     (H) 05/21/2019    CO2 17 (L) 05/23/2019    CO2 16 (L) 05/22/2019    CO2 15 (L) 05/21/2019    BUN 37 (H) 05/23/2019    BUN 36 (H) 05/22/2019    BUN 36 (H) 05/21/2019    CREATININE 1.72 (H) 05/23/2019    CREATININE 1.78 (H) 05/22/2019    CREATININE 1.52 (H) 05/21/2019    GLUCOSE 91 05/23/2019    GLUCOSE 95 05/22/2019    GLUCOSE 89 05/21/2019     CMP:   Lab Results   Component Value Date     05/23/2019    K 5.2 05/23/2019     05/23/2019    CO2 17 05/23/2019    BUN 37 05/23/2019    CREATININE 1.72 05/23/2019    GLUCOSE 91 05/23/2019    CALCIUM 6.6 05/23/2019    PROT 6.0 05/23/2019    LABALBU 3.4 05/23/2019    BILITOT 0.68 05/23/2019 ALKPHOS 328 05/23/2019    AST 17 05/23/2019    ALT 12 05/23/2019      Hepatic:   Lab Results   Component Value Date    AST 17 05/23/2019    AST 20 05/22/2019    AST 20 05/14/2019    ALT 12 05/23/2019    ALT 13 05/22/2019    ALT 16 05/14/2019    BILITOT 0.68 05/23/2019    BILITOT 0.67 05/22/2019    BILITOT 0.63 05/14/2019    ALKPHOS 328 (H) 05/23/2019    ALKPHOS 342 (H) 05/22/2019    ALKPHOS 399 (H) 05/14/2019     BNP: No results found for: BNP  Lipids: No results found for: CHOL, HDL  INR:   Lab Results   Component Value Date    INR 1.1 05/21/2019    INR 0.9 05/01/2017    INR 1.0 07/27/2013     PTH: No results found for: PTH  Phosphorus:    Lab Results   Component Value Date    PHOS 4.6 05/23/2019     Ionized Calcium: No results found for: IONCA  Magnesium:   Lab Results   Component Value Date    MG 2.3 05/23/2019     Albumin:   Lab Results   Component Value Date    LABALBU 3.4 05/23/2019     Last 3 CK, CKMB, Troponin: @LABRCNT(CKTOTAL:3,CKMB:3,TROPONINI:3)       URINE:)No results found for: Zev Burgos     Radiology:   Reviewed. Assessment:  1. Acute kidney injury, multifactorial hemodynamically related, noted to be on NSAIDs. 2. Anasarca with dietary non discretion. 3. Hyperkalemia, secondary to MIGUE. 4. Anemia. 5. Hypocalcemia  6. Acidosis    Plan:  Continue holding Bumex due to rising Cr and hypocalcemia. 3g Ca gluconate IV today, has normal  PTH, and low ionized calcium levels, Vitamin D level  pending   Off  Diclofenac   Start Sodium Bicarb PO after Ca replacement  Avoid hypotension, nephrotoxic drugs, NSAIDS, Lovenox, Fleets enema and IV contrast exposure. Follow up chemistries ordered for later today and for AM.    Please do not hesitate to contact us for any further questions/concerns. We will continue to follow along with you. Electronically signed by Eric Thompson MD  on 5/23/2019 at [place] [address] [facility].   Ph: [phone]

## 2019-05-23 NOTE — PLAN OF CARE
Problem: Falls - Risk of:  Goal: Will remain free from falls  Description  Will remain free from falls  Outcome: Ongoing  Note:   Pt. Free of falls and injuries this shift. Problem: SAFETY  Goal: Free from accidental physical injury  Outcome: Ongoing  Note:   Pt. Free of falls and injuries this shift. Problem: DAILY CARE  Goal: Daily care needs are met  Outcome: Ongoing     Problem: PAIN  Goal: Patient's pain/discomfort is manageable  Outcome: Ongoing  Note:   Adequate pain control achieved this shift. See MAR.

## 2019-05-23 NOTE — PROGRESS NOTES
osteoarthritis of both knees 03/31/2017        Plan:   1. Echocardiogram  2.  Urine for C&S    Electronically signed by Wendy Haynes MD on 5/23/2019 at 9:05 AM

## 2019-05-23 NOTE — PROGRESS NOTES
Physical Therapy    Facility/Department: [facility] MED SURG  Initial Assessment    NAME: Casey Alcocer  : 1955  MRN: [id number]    Date of Service: 2019    Discharge Recommendations:  Home with Home health PT, Home with assist PRN, Home with nursing aide        Assessment   Body structures, Functions, Activity limitations: Decreased functional mobility ; Decreased strength;Decreased endurance;Decreased safe awareness;Decreased balance  Assessment: Pt presents to ER with edema, weakness, SOB, and fatigue. Pt demo's unsafe use of RW, although states she has to use RW this way. Pt states her functional mobility is improved as compared to being at home prior to ER visit. COnt per POC to increase IND and safety at home. Treatment Diagnosis: impaired mobility d/t anasarca  Prognosis: Good  Decision Making: Medium Complexity  History: see prior hx  Exam: ROM, MMT, balance, endurance, sensation, mobilty  Patient Education: PT POC, safety  Barriers to Learning: none  REQUIRES PT FOLLOW UP: Yes  Activity Tolerance  Activity Tolerance: Patient Tolerated treatment well       Patient Diagnosis(es): There were no encounter diagnoses. has a past medical history of Carpal tunnel syndrome, Diverticulitis, Full dentures, Hypertension, Numbness, Pain in left wrist, TIA (transient ischemic attack), Wears glasses, Wears glasses, and Wears partial dentures. has a past surgical history that includes  section; Tonsillectomy; colostomy; Colon surgery; Carpal tunnel release (Right, 2014); Carpal tunnel release (Left, 09/10/2014); and Gastric bypass surgery.     Restrictions  Restrictions/Precautions  Restrictions/Precautions: Fall Risk  Required Braces or Orthoses?: No  Position Activity Restriction  Other position/activity restrictions: colostomy, up with assist  Vision/Hearing  Vision: Impaired  Vision Exceptions: Wears glasses at all times  Hearing: Within functional limits     Subjective  General  Chart Reviewed: Modified independent(uses bed rail)  Rolling to Right: Modified independent(uses bed rail)  Supine to Sit: Minimal assistance  Sit to Supine: Stand by assistance(difficulty lift LEs into bed, strong use of bed rail but only needs SBA for safety)  Scooting: Modified independent(flat bed, use of rails)  Transfers  Sit to Stand: Minimal Assistance  Stand to sit: Contact guard assistance  Comment: with RW  Ambulation  Ambulation?: Yes  More Ambulation?: No  Ambulation 1  Surface: level tile  Device: Rolling Walker  Assistance: Contact guard assistance  Quality of Gait: flexed onto RW  Gait Deviations: Shuffles  Distance: 10 x2  Comments: ambulates bed<>toilet to be cleaned up, severely flexed trunk onto RW, education for safety and risk of tipping RW forward. Pt states she has to walk this way  Stairs/Curb  Stairs?: No     Balance  Posture: Poor  Sitting - Static: Good  Sitting - Dynamic: Good  Standing - Static: Fair  Standing - Dynamic: Fair  Comments: with RW        Plan   Plan  Times per week: 6-7x  Times per day: Daily  Current Treatment Recommendations: Strengthening, Transfer Training, Endurance Training, Balance Training, Gait Training, Functional Mobility Training, Safety Education & Training, Patient/Caregiver Education & Training, Equipment Evaluation, Education, & procurement  Safety Devices  Type of devices: Gait belt, Patient at risk for falls, Call light within reach, Left in bed    G-Code       OutComes Score                                                  AM-PAC Score             Goals  Short term goals  Time Frame for Short term goals: 7 days  Short term goal 1:  Increase LE strength to WNL to maximize mobilty  Short term goal 2: Improve standing balance with RW to good to increase safety  Short term goal 3: Improve bed mobilty to IND  Short term goal 4: Improve transfers to MOD IND  Short term goal 5: Improve gait with RW to MOD IND with proper RW use to increase safety       Therapy Time   Individual Concurrent Group Co-treatment   Time In [address]         Time Out 1023         Minutes [address], [address]

## 2019-05-23 NOTE — PLAN OF CARE
Problem: Falls - Risk of:  Goal: Will remain free from falls  Description  Will remain free from falls  5/23/2019 1616 by Jenise Farley RN  Outcome: Ongoing  Pt. Remains free of falls, appropriate fall precautions in place. Will continue to monitor. Problem: DAILY CARE  Goal: Daily care needs are met  5/23/2019 1616 by Jenise Farley RN  Outcome: Ongoing   Pt. Denies assistance with daily need.

## 2019-05-23 NOTE — CARE COORDINATION
DISCHARGE PLANNING NOTE:    Plan remains for patient to be discharged home with [facility]. Urine cult back showing E-coli and Proteus Mirabilis. Notified RN, Geno Serrato, that sensitivity is back to notify physician for antibiotic order. Creat 1.72 today (was 1.78 yesterday). PT saw and rec home with home health. OT to work with patient today. Will continue to follow for additional d/c needs.     Electronically signed by Ade Beltran RN on 5/23/2019 at 11:03 AM

## 2019-05-24 PROBLEM — N39.0 URINARY TRACT INFECTION WITHOUT HEMATURIA: Status: ACTIVE | Noted: 2019-05-24

## 2019-05-24 LAB
ABSOLUTE EOS #: 0.2 K/UL (ref 0–0.4)
ABSOLUTE IMMATURE GRANULOCYTE: ABNORMAL K/UL (ref 0–0.3)
ABSOLUTE LYMPH #: 1.4 K/UL (ref 1–4.8)
ABSOLUTE MONO #: 0.2 K/UL (ref 0.1–1.3)
ALBUMIN SERPL-MCNC: 3.5 G/DL (ref 3.5–5.2)
ALBUMIN/GLOBULIN RATIO: ABNORMAL (ref 1–2.5)
ALP BLD-CCNC: 339 U/L (ref 35–104)
ALT SERPL-CCNC: 11 U/L (ref 5–33)
ANION GAP SERPL CALCULATED.3IONS-SCNC: 11 MMOL/L (ref 9–17)
AST SERPL-CCNC: 17 U/L
BASOPHILS # BLD: 1 % (ref 0–2)
BASOPHILS ABSOLUTE: 0 K/UL (ref 0–0.2)
BILIRUB SERPL-MCNC: 0.7 MG/DL (ref 0.3–1.2)
BUN BLDV-MCNC: 33 MG/DL (ref 8–23)
BUN/CREAT BLD: ABNORMAL (ref 9–20)
CALCIUM IONIZED: 1.01 MMOL/L (ref 1.13–1.33)
CALCIUM SERPL-MCNC: 6.8 MG/DL (ref 8.6–10.4)
CHLORIDE BLD-SCNC: 111 MMOL/L (ref 98–107)
CO2: 17 MMOL/L (ref 20–31)
CREAT SERPL-MCNC: 1.46 MG/DL (ref 0.5–0.9)
DIFFERENTIAL TYPE: ABNORMAL
EOSINOPHILS RELATIVE PERCENT: 4 % (ref 0–4)
GFR AFRICAN AMERICAN: 44 ML/MIN
GFR NON-AFRICAN AMERICAN: 36 ML/MIN
GFR SERPL CREATININE-BSD FRML MDRD: ABNORMAL ML/MIN/{1.73_M2}
GFR SERPL CREATININE-BSD FRML MDRD: ABNORMAL ML/MIN/{1.73_M2}
GLUCOSE BLD-MCNC: 204 MG/DL (ref 70–99)
HCT VFR BLD CALC: 34.3 % (ref 36–46)
HEMOGLOBIN: 11 G/DL (ref 12–16)
IMMATURE GRANULOCYTES: ABNORMAL %
LYMPHOCYTES # BLD: 29 % (ref 24–44)
MAGNESIUM: 2.3 MG/DL (ref 1.6–2.6)
MCH RBC QN AUTO: 29.5 PG (ref 26–34)
MCHC RBC AUTO-ENTMCNC: 32 G/DL (ref 31–37)
MCV RBC AUTO: 92 FL (ref 80–100)
MONOCYTES # BLD: 4 % (ref 1–7)
NRBC AUTOMATED: ABNORMAL PER 100 WBC
PDW BLD-RTO: 15 % (ref 11.5–14.9)
PHOSPHORUS: 3.6 MG/DL (ref 2.6–4.5)
PLATELET # BLD: 200 K/UL (ref 150–450)
PLATELET ESTIMATE: ABNORMAL
PMV BLD AUTO: 7.5 FL (ref 6–12)
POTASSIUM SERPL-SCNC: 4.8 MMOL/L (ref 3.7–5.3)
RBC # BLD: 3.72 M/UL (ref 4–5.2)
RBC # BLD: ABNORMAL 10*6/UL
SEG NEUTROPHILS: 62 % (ref 36–66)
SEGMENTED NEUTROPHILS ABSOLUTE COUNT: 3.1 K/UL (ref 1.3–9.1)
SODIUM BLD-SCNC: 139 MMOL/L (ref 135–144)
TOTAL PROTEIN: 6.5 G/DL (ref 6.4–8.3)
WBC # BLD: 4.9 K/UL (ref 3.5–11)
WBC # BLD: ABNORMAL 10*3/UL

## 2019-05-24 PROCEDURE — 82330 ASSAY OF CALCIUM: CPT

## 2019-05-24 PROCEDURE — 1200000000 HC SEMI PRIVATE

## 2019-05-24 PROCEDURE — 36415 COLL VENOUS BLD VENIPUNCTURE: CPT

## 2019-05-24 PROCEDURE — 6360000002 HC RX W HCPCS: Performed by: FAMILY MEDICINE

## 2019-05-24 PROCEDURE — 94640 AIRWAY INHALATION TREATMENT: CPT

## 2019-05-24 PROCEDURE — 6370000000 HC RX 637 (ALT 250 FOR IP): Performed by: FAMILY MEDICINE

## 2019-05-24 PROCEDURE — 80053 COMPREHEN METABOLIC PANEL: CPT

## 2019-05-24 PROCEDURE — 84100 ASSAY OF PHOSPHORUS: CPT

## 2019-05-24 PROCEDURE — 94761 N-INVAS EAR/PLS OXIMETRY MLT: CPT

## 2019-05-24 PROCEDURE — 6360000002 HC RX W HCPCS: Performed by: INTERNAL MEDICINE

## 2019-05-24 PROCEDURE — 83735 ASSAY OF MAGNESIUM: CPT

## 2019-05-24 PROCEDURE — 6370000000 HC RX 637 (ALT 250 FOR IP): Performed by: INTERNAL MEDICINE

## 2019-05-24 PROCEDURE — 2580000003 HC RX 258: Performed by: INTERNAL MEDICINE

## 2019-05-24 PROCEDURE — 85025 COMPLETE CBC W/AUTO DIFF WBC: CPT

## 2019-05-24 RX ADMIN — HEPARIN SODIUM 5000 UNITS: 5000 INJECTION INTRAVENOUS; SUBCUTANEOUS at 07:42

## 2019-05-24 RX ADMIN — Medication 325 MG: at 07:43

## 2019-05-24 RX ADMIN — SODIUM BICARBONATE 650 MG: 650 TABLET ORAL at 21:03

## 2019-05-24 RX ADMIN — ALBUTEROL SULFATE 2.5 MG: 2.5 SOLUTION RESPIRATORY (INHALATION) at 10:49

## 2019-05-24 RX ADMIN — SODIUM BICARBONATE 650 MG: 650 TABLET ORAL at 07:42

## 2019-05-24 RX ADMIN — ALBUTEROL SULFATE 2.5 MG: 2.5 SOLUTION RESPIRATORY (INHALATION) at 07:10

## 2019-05-24 RX ADMIN — HEPARIN SODIUM 5000 UNITS: 5000 INJECTION INTRAVENOUS; SUBCUTANEOUS at 21:04

## 2019-05-24 RX ADMIN — CHOLECALCIFEROL TAB 125 MCG (5000 UNIT) 50000 UNITS: 125 TAB at 17:56

## 2019-05-24 RX ADMIN — CIPROFLOXACIN HYDROCHLORIDE 500 MG: 500 TABLET, FILM COATED ORAL at 21:04

## 2019-05-24 RX ADMIN — CIPROFLOXACIN HYDROCHLORIDE 500 MG: 500 TABLET, FILM COATED ORAL at 07:42

## 2019-05-24 RX ADMIN — SERTRALINE HYDROCHLORIDE 25 MG: 50 TABLET ORAL at 21:04

## 2019-05-24 RX ADMIN — CALCIUM GLUCONATE 3 G: 98 INJECTION, SOLUTION INTRAVENOUS at 14:00

## 2019-05-24 ASSESSMENT — PAIN SCALES - GENERAL
PAINLEVEL_OUTOF10: 0
PAINLEVEL_OUTOF10: 4
PAINLEVEL_OUTOF10: 0

## 2019-05-24 ASSESSMENT — PAIN DESCRIPTION - LOCATION: LOCATION: KNEE

## 2019-05-24 ASSESSMENT — PAIN DESCRIPTION - ORIENTATION: ORIENTATION: LEFT;RIGHT

## 2019-05-24 NOTE — PROGRESS NOTES
Reason for Consult:  Acute kidney injury. Interval History:    F/U MIGUE  Cr improved ton 1.46  Ca improving but still below normal range  Good UOP    HISTORY OF PRESENT ILLNESS:    The patient is a 70 y.o. female who presents with generalized swelling that has worsened over weeks. On previous labs in 2017 her serum creatinines have been between 0.7 to 0.8 with eGFR of >60s ml/min. Prior to admission her creatinine was noted to be elevated at 1.59 with potassium level of 5.2 on 5/14. Denies any problems with nausea, vomiting, appetite, diarrhea or difficulty with urination. Denies any recent use of iv contrast. Noted to be on Voltaren prior to admission. Prior to Admission medications    Medication Sig Start Date End Date Taking? Authorizing Provider   omeprazole (PRILOSEC) 20 MG delayed release capsule Take 20 mg by mouth daily   Yes Historical Provider, MD   albuterol (PROVENTIL) (2.5 MG/3ML) 0.083% nebulizer solution Take 3 mLs by nebulization 4 times daily 4/4/17  Yes Vianney Miles MD   furosemide (LASIX) 20 MG tablet Take 40 mg by mouth daily   Yes Historical Provider, MD   ferrous sulfate 325 (65 FE) MG tablet Take 325 mg by mouth daily (with breakfast)   Yes Historical Provider, MD   sertraline (ZOLOFT) 25 MG tablet Take 25 mg by mouth nightly   Yes Historical Provider, MD   diclofenac (VOLTAREN) 75 MG EC tablet 1 tablet 2 times daily.  4/9/14  Yes Historical Provider, MD   esomeprazole Magnesium (NEXIUM) 20 MG PACK Take 20 mg by mouth daily    Historical Provider, MD   misoprostol (CYTOTEC) 200 MCG tablet Take 1 tablet by mouth daily  4/9/14   Historical Provider, MD       Scheduled Meds:   sodium bicarbonate  650 mg Oral BID    ciprofloxacin  500 mg Oral BID    heparin (porcine)  5,000 Units Subcutaneous BID    ferrous sulfate  325 mg Oral Daily with breakfast    sertraline  25 mg Oral Nightly    albuterol  2.5 mg Nebulization 4x Daily     Continuous Infusions:  PRN Meds:     Physical 05/24/2019    PROT 6.5 05/24/2019    LABALBU 3.5 05/24/2019    BILITOT 0.70 05/24/2019    ALKPHOS 339 05/24/2019    AST 17 05/24/2019    ALT 11 05/24/2019      Hepatic:   Lab Results   Component Value Date    AST 17 05/24/2019    AST 17 05/23/2019    AST 20 05/22/2019    ALT 11 05/24/2019    ALT 12 05/23/2019    ALT 13 05/22/2019    BILITOT 0.70 05/24/2019    BILITOT 0.68 05/23/2019    BILITOT 0.67 05/22/2019    ALKPHOS 339 (H) 05/24/2019    ALKPHOS 328 (H) 05/23/2019    ALKPHOS 342 (H) 05/22/2019     BNP: No results found for: BNP  Lipids: No results found for: CHOL, HDL  INR:   Lab Results   Component Value Date    INR 1.1 05/21/2019    INR 0.9 05/01/2017    INR 1.0 07/27/2013     PTH: No results found for: PTH  Phosphorus:    Lab Results   Component Value Date    PHOS 3.6 05/24/2019     Ionized Calcium: No results found for: IONCA  Magnesium:   Lab Results   Component Value Date    MG 2.3 05/24/2019     Albumin:   Lab Results   Component Value Date    LABALBU 3.5 05/24/2019     Last 3 CK, CKMB, Troponin: @LABRCNT(CKTOTAL:3,CKMB:3,TROPONINI:3)       URINE:)No results found for: Ayde Haleynut     Radiology:   Reviewed. Assessment:  1. Acute kidney injury, multifactorial hemodynamically related, noted to be on NSAIDs. 2. Anasarca with dietary non discretion. 3. Hyperkalemia, secondary to MIGUE. 4. Anemia. 5. Hypocalcemia  6. Acidosis  7. Severe Vitamin D deficiency     Plan:  Continue holding Bumex   3g Ca gluconate IV today, has normal  PTH, and low ionized calcium levels  Has very low 25 OH Vitamin D level, start replacement  Off  Diclofenac   Continue Sodium Bicarb PO    Avoid hypotension, nephrotoxic drugs, NSAIDS, Lovenox, Fleets enema and IV contrast exposure. Follow up chemistries ordered for later today and for AM.    Please do not hesitate to contact us for any further questions/concerns. We will continue to follow along with you.        Electronically signed by Mauricio Epps MD  on 5/24/2019 at 5:04 PM   [facility].   Ph: [phone]

## 2019-05-24 NOTE — PROGRESS NOTES
cough headache 04/01/2017    Asthmatic bronchitis with acute exacerbation 03/31/2017    Primary osteoarthritis of both knees 03/31/2017        Plan:   1.  Continue cipro 500 BID    Electronically signed by Manju Meneses MD on 5/24/2019 at 8:29 AM

## 2019-05-24 NOTE — CARE COORDINATION
ONGOING DISCHARGE PLAN:    Spoke with patient regarding discharge plan and patient confirms that plan is still to return to home w/ Sister, W/ [facility], [facility]. Pt. Has UTI, remains on PO Cipro. Cr. Today 1.46 from 1.72. Will continue to follow for additional discharge needs.     Electronically signed by Saige Gonsalves RN on 5/24/2019 at 10:14 AM

## 2019-05-25 VITALS
BODY MASS INDEX: 45.99 KG/M2 | WEIGHT: 293 LBS | HEIGHT: 67 IN | HEART RATE: 67 BPM | OXYGEN SATURATION: 98 % | TEMPERATURE: 98.4 F | DIASTOLIC BLOOD PRESSURE: 58 MMHG | SYSTOLIC BLOOD PRESSURE: 115 MMHG | RESPIRATION RATE: 14 BRPM

## 2019-05-25 LAB
ABSOLUTE EOS #: 0.2 K/UL (ref 0–0.4)
ABSOLUTE IMMATURE GRANULOCYTE: ABNORMAL K/UL (ref 0–0.3)
ABSOLUTE LYMPH #: 1.4 K/UL (ref 1–4.8)
ABSOLUTE MONO #: 0.3 K/UL (ref 0.1–1.3)
ALBUMIN SERPL-MCNC: 3.5 G/DL (ref 3.5–5.2)
ALBUMIN/GLOBULIN RATIO: ABNORMAL (ref 1–2.5)
ALP BLD-CCNC: 337 U/L (ref 35–104)
ALT SERPL-CCNC: 12 U/L (ref 5–33)
ANION GAP SERPL CALCULATED.3IONS-SCNC: 9 MMOL/L (ref 9–17)
AST SERPL-CCNC: 23 U/L
BASOPHILS # BLD: 1 % (ref 0–2)
BASOPHILS ABSOLUTE: 0 K/UL (ref 0–0.2)
BILIRUB SERPL-MCNC: 0.74 MG/DL (ref 0.3–1.2)
BUN BLDV-MCNC: 32 MG/DL (ref 8–23)
BUN/CREAT BLD: ABNORMAL (ref 9–20)
CALCIUM IONIZED: 1.09 MMOL/L (ref 1.13–1.33)
CALCIUM SERPL-MCNC: 7.6 MG/DL (ref 8.6–10.4)
CHLORIDE BLD-SCNC: 109 MMOL/L (ref 98–107)
CO2: 19 MMOL/L (ref 20–31)
CREAT SERPL-MCNC: 1.37 MG/DL (ref 0.5–0.9)
DIFFERENTIAL TYPE: ABNORMAL
EOSINOPHILS RELATIVE PERCENT: 5 % (ref 0–4)
GFR AFRICAN AMERICAN: 47 ML/MIN
GFR NON-AFRICAN AMERICAN: 39 ML/MIN
GFR SERPL CREATININE-BSD FRML MDRD: ABNORMAL ML/MIN/{1.73_M2}
GFR SERPL CREATININE-BSD FRML MDRD: ABNORMAL ML/MIN/{1.73_M2}
GLUCOSE BLD-MCNC: 150 MG/DL (ref 70–99)
HCT VFR BLD CALC: 34.1 % (ref 36–46)
HEMOGLOBIN: 10.8 G/DL (ref 12–16)
IMMATURE GRANULOCYTES: ABNORMAL %
LYMPHOCYTES # BLD: 29 % (ref 24–44)
MAGNESIUM: 2.2 MG/DL (ref 1.6–2.6)
MCH RBC QN AUTO: 29.4 PG (ref 26–34)
MCHC RBC AUTO-ENTMCNC: 31.8 G/DL (ref 31–37)
MCV RBC AUTO: 92.5 FL (ref 80–100)
MONOCYTES # BLD: 6 % (ref 1–7)
NRBC AUTOMATED: ABNORMAL PER 100 WBC
PDW BLD-RTO: 15 % (ref 11.5–14.9)
PHOSPHORUS: 3.8 MG/DL (ref 2.6–4.5)
PLATELET # BLD: 193 K/UL (ref 150–450)
PLATELET ESTIMATE: ABNORMAL
PMV BLD AUTO: 7.4 FL (ref 6–12)
POTASSIUM SERPL-SCNC: 4.9 MMOL/L (ref 3.7–5.3)
RBC # BLD: 3.68 M/UL (ref 4–5.2)
RBC # BLD: ABNORMAL 10*6/UL
SEG NEUTROPHILS: 59 % (ref 36–66)
SEGMENTED NEUTROPHILS ABSOLUTE COUNT: 2.9 K/UL (ref 1.3–9.1)
SODIUM BLD-SCNC: 137 MMOL/L (ref 135–144)
TOTAL PROTEIN: 6.2 G/DL (ref 6.4–8.3)
WBC # BLD: 4.9 K/UL (ref 3.5–11)
WBC # BLD: ABNORMAL 10*3/UL

## 2019-05-25 PROCEDURE — 36415 COLL VENOUS BLD VENIPUNCTURE: CPT

## 2019-05-25 PROCEDURE — 6370000000 HC RX 637 (ALT 250 FOR IP): Performed by: FAMILY MEDICINE

## 2019-05-25 PROCEDURE — 6360000002 HC RX W HCPCS: Performed by: FAMILY MEDICINE

## 2019-05-25 PROCEDURE — 94761 N-INVAS EAR/PLS OXIMETRY MLT: CPT

## 2019-05-25 PROCEDURE — 84100 ASSAY OF PHOSPHORUS: CPT

## 2019-05-25 PROCEDURE — 94640 AIRWAY INHALATION TREATMENT: CPT

## 2019-05-25 PROCEDURE — 85025 COMPLETE CBC W/AUTO DIFF WBC: CPT

## 2019-05-25 PROCEDURE — 83735 ASSAY OF MAGNESIUM: CPT

## 2019-05-25 PROCEDURE — 82330 ASSAY OF CALCIUM: CPT

## 2019-05-25 PROCEDURE — 80053 COMPREHEN METABOLIC PANEL: CPT

## 2019-05-25 PROCEDURE — 6370000000 HC RX 637 (ALT 250 FOR IP): Performed by: INTERNAL MEDICINE

## 2019-05-25 RX ORDER — CIPROFLOXACIN 500 MG/1
500 TABLET, FILM COATED ORAL 2 TIMES DAILY
Qty: 20 TABLET | Refills: 0 | Status: SHIPPED | OUTPATIENT
Start: 2019-05-25 | End: 2019-05-25

## 2019-05-25 RX ORDER — CIPROFLOXACIN 500 MG/1
500 TABLET, FILM COATED ORAL 2 TIMES DAILY
Qty: 20 TABLET | Refills: 0 | Status: SHIPPED | OUTPATIENT
Start: 2019-05-25 | End: 2019-06-04

## 2019-05-25 RX ORDER — CALCIUM CARBONATE 200(500)MG
500 TABLET,CHEWABLE ORAL 3 TIMES DAILY PRN
Status: DISCONTINUED | OUTPATIENT
Start: 2019-05-25 | End: 2019-05-25 | Stop reason: HOSPADM

## 2019-05-25 RX ORDER — CALCIUM CARBONATE 200(500)MG
500 TABLET,CHEWABLE ORAL 3 TIMES DAILY PRN
COMMUNITY
Start: 2019-05-25 | End: 2019-06-24

## 2019-05-25 RX ADMIN — ALBUTEROL SULFATE 2.5 MG: 2.5 SOLUTION RESPIRATORY (INHALATION) at 10:32

## 2019-05-25 RX ADMIN — CIPROFLOXACIN HYDROCHLORIDE 500 MG: 500 TABLET, FILM COATED ORAL at 10:15

## 2019-05-25 RX ADMIN — ALBUTEROL SULFATE 2.5 MG: 2.5 SOLUTION RESPIRATORY (INHALATION) at 14:30

## 2019-05-25 RX ADMIN — Medication 325 MG: at 10:24

## 2019-05-25 RX ADMIN — SODIUM BICARBONATE 650 MG: 650 TABLET ORAL at 10:15

## 2019-05-25 RX ADMIN — ALBUTEROL SULFATE 2.5 MG: 2.5 SOLUTION RESPIRATORY (INHALATION) at 07:37

## 2019-05-25 RX ADMIN — HEPARIN SODIUM 5000 UNITS: 5000 INJECTION INTRAVENOUS; SUBCUTANEOUS at 10:15

## 2019-05-25 RX ADMIN — ALBUTEROL SULFATE 2.5 MG: 2.5 SOLUTION RESPIRATORY (INHALATION) at 18:31

## 2019-05-25 NOTE — CARE COORDINATION
ONGOING DISCHARGE PLAN:    Spoke with patient regarding discharge plan and patient confirms that plan is still to go home with [facility]. On PO cipro. Will continue to follow for additional discharge needs. MARIEL NEEDS SIGNED AND COMPLETED.      Electronically signed by Jack Sanabria RN on 5/25/2019 at 2:11 PM

## 2019-05-25 NOTE — PROGRESS NOTES
Reason for Consult:  Acute kidney injury. Interval History:    F/U MIGUE  Cr improved to 1.37  Ca improving but still below normal range  K 4.9  Good UOP    HISTORY OF PRESENT ILLNESS:    The patient is a 70 y.o. female who presents with generalized swelling that has worsened over weeks. On previous labs in 2017 her serum creatinines have been between 0.7 to 0.8 with eGFR of >60s ml/min. Prior to admission her creatinine was noted to be elevated at 1.59 with potassium level of 5.2 on 5/14. Denies any problems with nausea, vomiting, appetite, diarrhea or difficulty with urination. Denies any recent use of iv contrast. Noted to be on Voltaren prior to admission. Prior to Admission medications    Medication Sig Start Date End Date Taking? Authorizing Provider   omeprazole (PRILOSEC) 20 MG delayed release capsule Take 20 mg by mouth daily   Yes Historical Provider, MD   albuterol (PROVENTIL) (2.5 MG/3ML) 0.083% nebulizer solution Take 3 mLs by nebulization 4 times daily 4/4/17  Yes Yareli Herrera MD   furosemide (LASIX) 20 MG tablet Take 40 mg by mouth daily   Yes Historical Provider, MD   ferrous sulfate 325 (65 FE) MG tablet Take 325 mg by mouth daily (with breakfast)   Yes Historical Provider, MD   sertraline (ZOLOFT) 25 MG tablet Take 25 mg by mouth nightly   Yes Historical Provider, MD   diclofenac (VOLTAREN) 75 MG EC tablet 1 tablet 2 times daily.  4/9/14  Yes Historical Provider, MD   esomeprazole Magnesium (NEXIUM) 20 MG PACK Take 20 mg by mouth daily    Historical Provider, MD   misoprostol (CYTOTEC) 200 MCG tablet Take 1 tablet by mouth daily  4/9/14   Historical Provider, MD       Scheduled Meds:   vitamin D3  50,000 Units Oral Weekly    sodium bicarbonate  650 mg Oral BID    ciprofloxacin  500 mg Oral BID    heparin (porcine)  5,000 Units Subcutaneous BID    ferrous sulfate  325 mg Oral Daily with breakfast    sertraline  25 mg Oral Nightly    albuterol  2.5 mg Nebulization 4x Daily Continuous Infusions:  PRN Meds:     Physical Exam:  Vitals:    05/24/19 1836 05/25/19 0737 05/25/19 1027 05/25/19 1030   BP: (!) 117/58      Pulse: 68      Resp: 16 18  16   Temp: 98.7 °F (37.1 °C)      TempSrc: Oral      SpO2: 97% 97%  98%   Weight:   (!) 367 lb 8.1 oz (166.7 kg)    Height:         I/O last 3 completed shifts: In: 868.9 [P.O.:720; I.V.:148.9]  Out: 1100 [Urine:1100]    General:  Awake, alert, not in distress. Appears to be stated age. HEENT: Atraumatic, normocephalic. Anicteric sclera. Pink and moist oral mucosa. No JVD. Chest: Bilateral air entry, clear to auscultation, no wheezing, rhonchi or rales. Cardiovascular: RRR, S1S2, no murmur, rub or gallop. Has mild lower extremity edema. Abdomen: Soft, non tender to palpation. Musculoskeletal: No cyanosis or clubbing. Integumentary: Pink, warm and dry. Free from rash or lesions. Skin turgor normal.  CNS: Oriented to person, place and time. Speech clear. Face symmetrical. No tremor.      Data:  CBC:   Lab Results   Component Value Date    WBC 4.9 05/25/2019    HGB 10.8 (L) 05/25/2019    HCT 34.1 (L) 05/25/2019    MCV 92.5 05/25/2019     05/25/2019     BMP:    Lab Results   Component Value Date     05/25/2019     05/24/2019     05/23/2019    K 4.9 05/25/2019    K 4.8 05/24/2019    K 5.2 05/23/2019     (H) 05/25/2019     (H) 05/24/2019     (H) 05/23/2019    CO2 19 (L) 05/25/2019    CO2 17 (L) 05/24/2019    CO2 17 (L) 05/23/2019    BUN 32 (H) 05/25/2019    BUN 33 (H) 05/24/2019    BUN 37 (H) 05/23/2019    CREATININE 1.37 (H) 05/25/2019    CREATININE 1.46 (H) 05/24/2019    CREATININE 1.72 (H) 05/23/2019    GLUCOSE 150 (H) 05/25/2019    GLUCOSE 204 (H) 05/24/2019    GLUCOSE 91 05/23/2019     CMP:   Lab Results   Component Value Date     05/25/2019    K 4.9 05/25/2019     05/25/2019    CO2 19 05/25/2019    BUN 32 05/25/2019    CREATININE 1.37 05/25/2019    GLUCOSE 150 05/25/2019    CALCIUM 7.6 05/25/2019    PROT 6.2 05/25/2019    LABALBU 3.5 05/25/2019    BILITOT 0.74 05/25/2019    ALKPHOS 337 05/25/2019    AST 23 05/25/2019    ALT 12 05/25/2019      Hepatic:   Lab Results   Component Value Date    AST 23 05/25/2019    AST 17 05/24/2019    AST 17 05/23/2019    ALT 12 05/25/2019    ALT 11 05/24/2019    ALT 12 05/23/2019    BILITOT 0.74 05/25/2019    BILITOT 0.70 05/24/2019    BILITOT 0.68 05/23/2019    ALKPHOS 337 (H) 05/25/2019    ALKPHOS 339 (H) 05/24/2019    ALKPHOS 328 (H) 05/23/2019     BNP: No results found for: BNP  Lipids: No results found for: CHOL, HDL  INR:   Lab Results   Component Value Date    INR 1.1 05/21/2019    INR 0.9 05/01/2017    INR 1.0 07/27/2013     PTH: No results found for: PTH  Phosphorus:    Lab Results   Component Value Date    PHOS 3.8 05/25/2019     Ionized Calcium: No results found for: IONCA  Magnesium:   Lab Results   Component Value Date    MG 2.2 05/25/2019     Albumin:   Lab Results   Component Value Date    LABALBU 3.5 05/25/2019     Last 3 CK, CKMB, Troponin: @LABRCNT(CKTOTAL:3,CKMB:3,TROPONINI:3)       URINE:)No results found for: Rafita Sarabia     Radiology:   Reviewed. Assessment:  1. Acute kidney injury, multifactorial hemodynamically related, noted to be on NSAIDs. 2. Anasarca with dietary non discretion. 3. Hyperkalemia, secondary to MIGUE. 4. Anemia. 5. Hypocalcemia  6. Acidosis  7. Severe Vitamin D deficiency     Plan:  Resume lasix on discharge. 2g Na restricted diet at home. Has normal  PTH, and low ionized calcium levels  Has very low 25 OH Vitamin D level on replacement. Will need weekly replacement x 6 months. Start tums for ca supplementation. Continue Sodium Bicarb PO . Bicarb level improving. SBP trending 90-low 100s. On low K diet. Monitor K.  hgb stable. Avoid voltaren and NSAIDs long-term. She states understanding. BMP in one week. Renal follow up in 2-3 weeks.     Avoid hypotension, nephrotoxic drugs, NSAIDS, Lovenox, Fleets enema and IV contrast exposure. Follow up chemistries ordered for later today and for AM.    Please do not hesitate to contact us for any further questions/concerns. We will continue to follow along with you. Pt seen in collaboration with Dr. Karli Cuadra. Electronically signed by NIALL Bo CNP  on 5/25/2019 at 11:19 AM   [facility]. Ph: [phone]    Physician Addendum  I have seen and examined pt at bed side.    I reviewed and agree with CNP's note. I performed all key and critical portions of this evaluation.  I agree with the plan of care as noted above.     Electronically signed by Bharati Melvin MD on 05/25/19 2:22 PM

## 2019-05-25 NOTE — DISCHARGE SUMMARY
[facility]   Discharge Summary    Patient ID: Nithya Steel  MRN: [id number]     Acct:  [de-identified]       Patient's PCP: Dorcas Willams MD    Admit Date: 5/21/2019     Discharge Date:  5/25/19     Admitting Physician: Dorcas Willams MD    Discharge Physician: Dorcas Willams MD     Active Discharge Diagnoses:    Primary Problem  Leg edema  MIGUE on [address] Problems    Diagnosis Date Noted    Urinary tract infection without hematuria [N39.0] 05/24/2019    Acute kidney injury ([address] [N17.9] 05/23/2019    Anasarca [R60.1] 05/21/2019    Morbid obesity due to excess calories ([address] [E66.01] 05/01/2017       The patient was seen and examined on day of discharge and this discharge summary is in conjunction with any daily progress note from day of discharge. Code Status:  Full Code    Hospital Course: Admitted for diuresis/Nephro consult for 3+ BLE edema and inability to ambulate. Pt was given IV Bumex which helped with edema, Nephro treated Hypocalcemia, pt had UTI treated with Cipro. Pt improved. Today pt has a Nl exam with 1+BLE edema, she has been able to ambulate with PT and they recommend home on discharge. Consults:  IP CONSULT TO NEPHROLOGY    Disposition:  Home    Discharged Condition: Stable    Follow Up: [address]  [phone]    they will call you to set up a time to come out to your house    Dorcas Willams MD  [address]  [phone]    Schedule an appointment as soon as possible for a visit in 1 week      [address] [facility] [phone]    They will call and schedule an appointment for a home visit.     Laurita Galvez MD  [address] [facility]  [phone]    In 3 weeks        Diet: DIET RENAL; Daily Fluid Restriction: 1000 ml    Discharge Medications:      Medication List START taking these medications    calcium carbonate 500 MG chewable tablet  Commonly known as:  TUMS  Take 1 tablet by mouth 3 times daily as needed for Heartburn     ciprofloxacin 500 MG tablet  Commonly known as:  CIPRO  Take 1 tablet by mouth 2 times daily for 10 days     vitamin D3 5000 units Tabs tablet  Commonly known as:  CHOLECALCIFEROL  Take 10 tablets by mouth once a week  Start taking on:  5/31/2019        CONTINUE taking these medications    ferrous sulfate 325 (65 Fe) MG tablet     furosemide 20 MG tablet  Commonly known as:  LASIX     omeprazole 20 MG delayed release capsule  Commonly known as:  PRILOSEC     sertraline 25 MG tablet  Commonly known as:  ZOLOFT        STOP taking these medications    albuterol (2.5 MG/3ML) 0.083% nebulizer solution  Commonly known as:  PROVENTIL     diclofenac 75 MG EC tablet  Commonly known as:  VOLTAREN     misoprostol 200 MCG tablet  Commonly known as:  CYTOTEC     NEXIUM 20 MG Pack  Generic drug:  esomeprazole Magnesium           Where to Get Your Medications      You can get these medications from any pharmacy    Bring a paper prescription for each of these medications  · ciprofloxacin 500 MG tablet  · vitamin D3 5000 units Tabs tablet  You don't need a prescription for these medications  · calcium carbonate 500 MG chewable tablet         Time Spent on discharge is  35 mins in patient examination, evaluation, counseling as well as medication reconciliation, prescriptions for required medications, discharge plan and follow up. Electronically signed by Yobani Boo MD on 5/25/2019 at 5:54 PM     Thank you Dr. Yobani Boo MD for the opportunity to be involved in this patient's care.

## 2019-05-25 NOTE — PLAN OF CARE
Problem: Falls - Risk of:  Goal: Will remain free from falls  Description  Will remain free from falls  5/25/2019 0418 by Vilma Eugene RN  Outcome: Ongoing  Note:   No falls this shift. Call light within reach and siderails x2. Bed in lowest position. Patient safety maintained. Problem: SAFETY  Goal: Free from accidental physical injury  Outcome: Ongoing  Note:   No falls this shift. Call light within reach and siderails x2. Bed in lowest position. Patient safety maintained. Problem: DAILY CARE  Goal: Daily care needs are met  Outcome: Ongoing     Problem: PAIN  Goal: Patient's pain/discomfort is manageable  5/25/2019 0418 by Vilma Eugene RN  Outcome: Ongoing  Note:   Patient denies any pain. Will continue to monitor.      Problem: Nutrition  Goal: Optimal nutrition therapy  Outcome: Ongoing

## 2019-05-26 NOTE — CARE COORDINATION
Continuity of Care Form    Patient Name: Phyllis Burgess   :  1955  MRN:  [id number]    Admit date:  2019  Discharge date:  2019    Code Status Order: Full Code   Advance Directives:   Advance Care Flowsheet Documentation     Date/Time Healthcare Directive Type of Healthcare Directive Copy in [address] Agent's Name Healthcare Agent's Phone Number    [phone]  No, patient does not have an advance directive for healthcare treatment -- -- -- -- --          Admitting Physician:  Juwan Yadav MD  PCP: Juwan Yadav MD    Discharging Nurse: [facility] Unit/Room#: 9961/9757-08  Discharging Unit Phone Number: [phone]    Emergency Contact:   Extended Emergency Contact Information  Primary Emergency Contact: [address] [facility] Phone: [phone]  Work Phone: [phone]  Relation: Brother/Sister  Secondary Emergency Contact: Alistair Lanier  Address: [address] [place] [address] Phone: [phone]  Mobile Phone: [phone]  Relation: Child    Past Surgical History:  Past Surgical History:   Procedure Laterality Date    CARPAL TUNNEL RELEASE Right 2014    CARPAL TUNNEL RELEASE Left 09/10/2014     SECTION      X2    COLON SURGERY      ATTEMPTED TO REVERSE COLOSTOMY UNSUCCESSFUL    COLOSTOMY      GASTRIC BYPASS SURGERY      TONSILLECTOMY         Immunization History: There is no immunization history on file for this patient. Active Problems:  Patient Active Problem List   Diagnosis Code    Asthmatic bronchitis with acute exacerbation J45. 0    Primary osteoarthritis of both knees M17.0    Primary cough headache G44.83    SBO (small bowel obstruction) ([facility]) K56.609    Ventral hernia K43.9    Morbid obesity due to excess calories ([facility]) E66.01    Essential hypertension I10    Intermittent asthma J45.20       Isolation/Infection:   Isolation          No Isolation Nurse Assessment:  Last Vital Signs: /61   Pulse 57   Temp 97.1 °F (36.2 °C) (Oral)   Resp 14   Ht 5' 7\" (1.702 m)   Wt (!) 374 lb 12.5 oz (170 kg)   SpO2 97%   BMI 58.70 kg/m²     Last documented pain score (0-10 scale):    Last Weight:   Wt Readings from Last 1 Encounters:   05/21/19 (!) 374 lb 12.5 oz (170 kg)     Mental Status:  oriented, alert, coherent, logical and thought processes intact    IV Access:  - None    Nursing Mobility/ADLs:  Walking   Assisted  Transfer  Assisted  Bathing  Assisted  Dressing  Assisted  Toileting  Independent  Feeding  [address]  Independent  Med Delivery   whole    Wound Care Documentation and Therapy:        Elimination:  Continence:   · Bowel: Yes  · Bladder: Yes  Urinary Catheter: None   Colostomy/Ileostomy/Ileal Conduit: Yes       Date of Last BM: 5/25/2019  No intake or output data in the 24 hours ending 05/21/19 1618  No intake/output data recorded. Safety Concerns:     None and At Risk for Falls    Impairments/Disabilities:      None    Nutrition Therapy:  Current Nutrition Therapy:   - Oral Diet:  General    Routes of Feeding: Oral  Liquids: No Restrictions  Daily Fluid Restriction: no  Last Modified Barium Swallow with Video (Video Swallowing Test): not done    Treatments at the Time of Hospital Discharge:   Respiratory Treatments: Albuterol   Oxygen Therapy:  is not on home oxygen therapy. Ventilator:    - No ventilator support    Rehab Therapies: Physical Therapy and Occupational Therapy  Weight Bearing Status/Restrictions: No weight bearing restirctions  Other Medical Equipment (for information only, NOT a DME order):  Cane, walker. Other Treatments: skilled nursing assessment, medication education and monitoring, dietician to see and monitor. Home health care agency's  to evaluate patient two weeks prior to discharge from home health to determine post-discharge services.      Patient's personal belongings (please select all that are sent with patient):  None    RN SIGNATURE:  Electronically signed by Gerson Bacon RN on 5/25/19 at 5:49 PM    CASE MANAGEMENT/SOCIAL WORK SECTION    Inpatient Status Date: 5/22/19    Readmission Risk Assessment Score:  Readmission Risk              Risk of Unplanned Readmission:        5           Discharging to Facility/ [address] [place] [address],[address]  Phone [phone]  · Fax [phone]  · Home health care agency's  to evaluate patient two weeks prior to discharge from home health to determine post-discharge services. / signature: Electronically signed by Otf Abreu RN on 5/21/19 at 4:19 PM    PHYSICIAN SECTION    Prognosis: Good    Condition at Discharge: Stable    Rehab Potential (if transferring to Rehab): Good    Recommended Labs or Other Treatments After Discharge: See above    Physician Certification: I certify the above information and transfer of Sina Lam  is necessary for the continuing treatment of the diagnosis listed and that she requires Home Care for less 30 days.      Update Admission H&P: No change in H&P    PHYSICIAN SIGNATURE:  Electronically signed by Karime Lynch MD on 5/25/19 at 5:34 PM    Current Discharge Medication List     START taking these medications     Medication Dose   calcium carbonate (TUMS) 500 MG chewable tablet 500 mg   Take 1 tablet by mouth 3 times daily as needed for Heartburn       ciprofloxacin (CIPRO) 500 MG tablet 500 mg   Take 1 tablet by mouth 2 times daily for 10 days   Quantity: 20 tablet Refills: 0       vitamin D3 (CHOLECALCIFEROL) 5000 units TABS tablet 50,000 Units   Take 10 tablets by mouth once a week   Quantity: 30 tablet Refills: 5           CONTINUE these medications which have NOT CHANGED     Medication Dose   omeprazole (PRILOSEC) 20 MG delayed release capsule 20 mg   Take 20 mg by mouth daily       furosemide (LASIX) 20 MG tablet 40 mg   Take 40 mg by mouth daily ferrous sulfate 325 (65 FE) MG tablet 325 mg   Take 325 mg by mouth daily (with breakfast)       sertraline (ZOLOFT) 25 MG tablet 25 mg   Take 25 mg by mouth nightly           STOP taking these previous medications     Medication Dose Reason for Stopping Comments   (STOP TAKING) esomeprazole Magnesium (NEXIUM) 20 MG PACK 20 mg           (STOP TAKING) albuterol (PROVENTIL) (2.5 MG/3ML) 0.083% nebulizer solution 2.5 mg           (STOP TAKING) misoprostol (CYTOTEC) 200 MCG tablet 1 tablet           (STOP TAKING) diclofenac (VOLTAREN) 75 MG EC tablet 1 tablet

## 2019-09-03 DIAGNOSIS — M25.562 CHRONIC PAIN OF LEFT KNEE: Primary | ICD-10-CM

## 2019-09-03 DIAGNOSIS — G89.29 CHRONIC PAIN OF LEFT KNEE: Primary | ICD-10-CM

## 2019-09-04 ENCOUNTER — HOSPITAL ENCOUNTER (OUTPATIENT)
Dept: PREADMISSION TESTING | Age: 64
Discharge: HOME | End: 2019-09-08
Payer: MEDICARE

## 2019-09-04 ENCOUNTER — OFFICE VISIT (OUTPATIENT)
Dept: ORTHOPEDIC SURGERY | Age: 64
End: 2019-09-04
Payer: MEDICARE

## 2019-09-04 VITALS
OXYGEN SATURATION: 99 % | RESPIRATION RATE: 16 BRPM | BODY MASS INDEX: 47.09 KG/M2 | SYSTOLIC BLOOD PRESSURE: 114 MMHG | HEART RATE: 71 BPM | HEIGHT: 66 IN | DIASTOLIC BLOOD PRESSURE: 61 MMHG | WEIGHT: 293 LBS | TEMPERATURE: 97.3 F

## 2019-09-04 VITALS — BODY MASS INDEX: 47.09 KG/M2 | HEIGHT: 66 IN | WEIGHT: 293 LBS

## 2019-09-04 DIAGNOSIS — G89.29 CHRONIC PAIN OF BOTH KNEES: Primary | ICD-10-CM

## 2019-09-04 DIAGNOSIS — M17.0 PRIMARY OSTEOARTHRITIS OF BOTH KNEES: ICD-10-CM

## 2019-09-04 DIAGNOSIS — M25.561 CHRONIC PAIN OF BOTH KNEES: Primary | ICD-10-CM

## 2019-09-04 DIAGNOSIS — M25.562 CHRONIC PAIN OF BOTH KNEES: Primary | ICD-10-CM

## 2019-09-04 LAB
-: ABNORMAL
ABSOLUTE EOS #: 0.1 K/UL (ref 0–0.4)
ABSOLUTE IMMATURE GRANULOCYTE: ABNORMAL K/UL (ref 0–0.3)
ABSOLUTE LYMPH #: 1.6 K/UL (ref 1–4.8)
ABSOLUTE MONO #: 0.4 K/UL (ref 0.1–1.3)
AMORPHOUS: ABNORMAL
ANION GAP SERPL CALCULATED.3IONS-SCNC: 12 MMOL/L (ref 9–17)
BACTERIA: ABNORMAL
BASOPHILS # BLD: 1 % (ref 0–2)
BASOPHILS ABSOLUTE: 0 K/UL (ref 0–0.2)
BILIRUBIN URINE: NEGATIVE
BUN BLDV-MCNC: 37 MG/DL (ref 8–23)
BUN/CREAT BLD: ABNORMAL (ref 9–20)
CALCIUM SERPL-MCNC: 9.4 MG/DL (ref 8.6–10.4)
CASTS UA: ABNORMAL /LPF
CHLORIDE BLD-SCNC: 109 MMOL/L (ref 98–107)
CO2: 18 MMOL/L (ref 20–31)
COLOR: YELLOW
COMMENT UA: ABNORMAL
CREAT SERPL-MCNC: 1.5 MG/DL (ref 0.5–0.9)
CRYSTALS, UA: ABNORMAL /HPF
DIFFERENTIAL TYPE: ABNORMAL
EOSINOPHILS RELATIVE PERCENT: 3 % (ref 0–4)
EPITHELIAL CELLS UA: ABNORMAL /HPF
GFR AFRICAN AMERICAN: 42 ML/MIN
GFR NON-AFRICAN AMERICAN: 35 ML/MIN
GFR SERPL CREATININE-BSD FRML MDRD: ABNORMAL ML/MIN/{1.73_M2}
GFR SERPL CREATININE-BSD FRML MDRD: ABNORMAL ML/MIN/{1.73_M2}
GLUCOSE BLD-MCNC: 93 MG/DL (ref 70–99)
GLUCOSE URINE: NEGATIVE
HCT VFR BLD CALC: 36.7 % (ref 36–46)
HEMOGLOBIN: 11.8 G/DL (ref 12–16)
IMMATURE GRANULOCYTES: ABNORMAL %
KETONES, URINE: NEGATIVE
LEUKOCYTE ESTERASE, URINE: ABNORMAL
LYMPHOCYTES # BLD: 32 % (ref 24–44)
MCH RBC QN AUTO: 29.2 PG (ref 26–34)
MCHC RBC AUTO-ENTMCNC: 32 G/DL (ref 31–37)
MCV RBC AUTO: 91 FL (ref 80–100)
MONOCYTES # BLD: 7 % (ref 1–7)
MRSA, DNA, NASAL: ABNORMAL
MUCUS: ABNORMAL
NITRITE, URINE: POSITIVE
NRBC AUTOMATED: ABNORMAL PER 100 WBC
OTHER OBSERVATIONS UA: ABNORMAL
PDW BLD-RTO: 15 % (ref 11.5–14.9)
PH UA: 5.5 (ref 5–8)
PLATELET # BLD: 196 K/UL (ref 150–450)
PLATELET ESTIMATE: ABNORMAL
PMV BLD AUTO: 8 FL (ref 6–12)
POTASSIUM SERPL-SCNC: 5 MMOL/L (ref 3.7–5.3)
PROTEIN UA: NEGATIVE
RBC # BLD: 4.03 M/UL (ref 4–5.2)
RBC # BLD: ABNORMAL 10*6/UL
RBC UA: ABNORMAL /HPF
RENAL EPITHELIAL, UA: ABNORMAL /HPF
SEG NEUTROPHILS: 57 % (ref 36–66)
SEGMENTED NEUTROPHILS ABSOLUTE COUNT: 3 K/UL (ref 1.3–9.1)
SODIUM BLD-SCNC: 139 MMOL/L (ref 135–144)
SPECIFIC GRAVITY UA: 1.02 (ref 1–1.03)
SPECIMEN DESCRIPTION: ABNORMAL
TRICHOMONAS: ABNORMAL
TURBIDITY: ABNORMAL
URINE HGB: ABNORMAL
UROBILINOGEN, URINE: NORMAL
WBC # BLD: 5.1 K/UL (ref 3.5–11)
WBC # BLD: ABNORMAL 10*3/UL
WBC UA: ABNORMAL /HPF
YEAST: ABNORMAL

## 2019-09-04 PROCEDURE — 85025 COMPLETE CBC W/AUTO DIFF WBC: CPT

## 2019-09-04 PROCEDURE — 1123F ACP DISCUSS/DSCN MKR DOCD: CPT | Performed by: ORTHOPAEDIC SURGERY

## 2019-09-04 PROCEDURE — G8400 PT W/DXA NO RESULTS DOC: HCPCS | Performed by: ORTHOPAEDIC SURGERY

## 2019-09-04 PROCEDURE — 87186 SC STD MICRODIL/AGAR DIL: CPT

## 2019-09-04 PROCEDURE — 4040F PNEUMOC VAC/ADMIN/RCVD: CPT | Performed by: ORTHOPAEDIC SURGERY

## 2019-09-04 PROCEDURE — 3017F COLORECTAL CA SCREEN DOC REV: CPT | Performed by: ORTHOPAEDIC SURGERY

## 2019-09-04 PROCEDURE — 1090F PRES/ABSN URINE INCON ASSESS: CPT | Performed by: ORTHOPAEDIC SURGERY

## 2019-09-04 PROCEDURE — G8428 CUR MEDS NOT DOCUMENT: HCPCS | Performed by: ORTHOPAEDIC SURGERY

## 2019-09-04 PROCEDURE — 87088 URINE BACTERIA CULTURE: CPT

## 2019-09-04 PROCEDURE — 80048 BASIC METABOLIC PNL TOTAL CA: CPT

## 2019-09-04 PROCEDURE — 36415 COLL VENOUS BLD VENIPUNCTURE: CPT

## 2019-09-04 PROCEDURE — 87641 MR-STAPH DNA AMP PROBE: CPT

## 2019-09-04 PROCEDURE — 87086 URINE CULTURE/COLONY COUNT: CPT

## 2019-09-04 PROCEDURE — 81001 URINALYSIS AUTO W/SCOPE: CPT

## 2019-09-04 PROCEDURE — 1036F TOBACCO NON-USER: CPT | Performed by: ORTHOPAEDIC SURGERY

## 2019-09-04 PROCEDURE — G8417 CALC BMI ABV UP PARAM F/U: HCPCS | Performed by: ORTHOPAEDIC SURGERY

## 2019-09-04 PROCEDURE — 99203 OFFICE O/P NEW LOW 30 MIN: CPT | Performed by: ORTHOPAEDIC SURGERY

## 2019-09-04 PROCEDURE — 93005 ELECTROCARDIOGRAM TRACING: CPT | Performed by: ANESTHESIOLOGY

## 2019-09-04 RX ORDER — FUROSEMIDE 40 MG/1
40 TABLET ORAL DAILY
COMMUNITY

## 2019-09-04 RX ORDER — SPIRONOLACTONE 50 MG/1
50 TABLET, FILM COATED ORAL DAILY
COMMUNITY

## 2019-09-04 RX ORDER — CHOLECALCIFEROL (VITAMIN D3) 1250 MCG
1 CAPSULE ORAL WEEKLY
COMMUNITY

## 2019-09-04 ASSESSMENT — PROMIS GLOBAL HEALTH SCALE
IN GENERAL, WOULD YOU SAY YOUR HEALTH IS...[ON A SCALE OF 1 (POOR) TO 5 (EXCELLENT)]: 2
IN THE PAST 7 DAYS, HOW WOULD YOU RATE YOUR PAIN ON AVERAGE [ON A SCALE FROM 0 (NO PAIN) TO 10 (WORST IMAGINABLE PAIN)]?: 7
IN THE PAST 7 DAYS, HOW OFTEN HAVE YOU BEEN BOTHERED BY EMOTIONAL PROBLEMS, SUCH AS FEELING ANXIOUS, DEPRESSED, OR IRRITABLE [ON A SCALE FROM 1 (NEVER) TO 5 (ALWAYS)]?: 2
TO WHAT EXTENT ARE YOU ABLE TO CARRY OUT YOUR EVERYDAY PHYSICAL ACTIVITIES SUCH AS WALKING, CLIMBING STAIRS, CARRYING GROCERIES, OR MOVING A CHAIR [ON A SCALE OF 1 (NOT AT ALL) TO 5 (COMPLETELY)]?: 2
SUM OF RESPONSES TO QUESTIONS 3, 6, 7, & 8: 14
IN GENERAL, HOW WOULD YOU RATE YOUR PHYSICAL HEALTH [ON A SCALE OF 1 (POOR) TO 5 (EXCELLENT)]?: 2
IN GENERAL, WOULD YOU SAY YOUR QUALITY OF LIFE IS...[ON A SCALE OF 1 (POOR) TO 5 (EXCELLENT)]: 3
WHO IS THE PERSON COMPLETING THE PROMIS V1.1 SURVEY?: 0
SUM OF RESPONSES TO QUESTIONS 2, 4, 5, & 10: 11
IN GENERAL, HOW WOULD YOU RATE YOUR SATISFACTION WITH YOUR SOCIAL ACTIVITIES AND RELATIONSHIPS [ON A SCALE OF 1 (POOR) TO 5 (EXCELLENT)]?: 3
IN GENERAL, HOW WOULD YOU RATE YOUR MENTAL HEALTH, INCLUDING YOUR MOOD AND YOUR ABILITY TO THINK [ON A SCALE OF 1 (POOR) TO 5 (EXCELLENT)]?: 3
HOW IS THE PROMIS V1.1 BEING ADMINISTERED?: 0
IN GENERAL, PLEASE RATE HOW WELL YOU CARRY OUT YOUR USUAL SOCIAL ACTIVITIES (INCLUDES ACTIVITIES AT HOME, AT WORK, AND IN YOUR COMMUNITY, AND RESPONSIBILITIES AS A PARENT, CHILD, SPOUSE, EMPLOYEE, FRIEND, ETC) [ON A SCALE OF 1 (POOR) TO 5 (EXCELLENT)]?: 3
IN THE PAST 7 DAYS, HOW WOULD YOU RATE YOUR FATIGUE ON AVERAGE [ON A SCALE FROM 1 (NONE) TO 5 (VERY SEVERE)]?: 3

## 2019-09-04 ASSESSMENT — PAIN DESCRIPTION - PAIN TYPE: TYPE: CHRONIC PAIN

## 2019-09-04 ASSESSMENT — PAIN DESCRIPTION - ORIENTATION: ORIENTATION: LEFT

## 2019-09-04 ASSESSMENT — KOOS JR
TWISING OR PIVOTING ON KNEE: 4
BENDING TO THE FLOOR TO PICK UP OBJECT: 2
GOING UP OR DOWN STAIRS: 4
HOW SEVERE IS YOUR KNEE STIFFNESS AFTER FIRST WAKING IN MORNING: 4
RISING FROM SITTING: 2
STRAIGHTENING KNEE FULLY: 4
STANDING UPRIGHT: 4

## 2019-09-04 ASSESSMENT — PAIN DESCRIPTION - LOCATION: LOCATION: KNEE

## 2019-09-04 ASSESSMENT — PAIN SCALES - GENERAL: PAINLEVEL_OUTOF10: 8

## 2019-09-04 NOTE — H&P (VIEW-ONLY)
HISTORY       Past Surgical History:   Procedure Laterality Date    CARPAL TUNNEL RELEASE Right 2014    CARPAL TUNNEL RELEASE Left 09/10/2014     SECTION      X2    CHOLECYSTECTOMY      COLON SURGERY      ATTEMPTED TO REVERSE COLOSTOMY UNSUCCESSFUL    COLONOSCOPY      COLOSTOMY      for ruptured diverticuli    GASTRIC BYPASS SURGERY      HAND SURGERY Left 2019    to shave down bone growth    TONSILLECTOMY         FAMILY HISTORY       Family History   Problem Relation Age of Onset    Diabetes Mother     Heart Disease Father     Diabetes Father     Heart Failure Father        SOCIAL HISTORY       Social History     Socioeconomic History    Marital status:      Spouse name: None    Number of children: None    Years of education: None    Highest education level: None   Occupational History    None   Social Needs    Financial resource strain: None    Food insecurity:     Worry: None     Inability: None    Transportation needs:     Medical: None     Non-medical: None   Tobacco Use    Smoking status: Former Smoker    Smokeless tobacco: Never Used    Tobacco comment: QUIT SMOKING 1 YR   Substance and Sexual Activity    Alcohol use: No    Drug use: No    Sexual activity: None   Lifestyle    Physical activity:     Days per week: None     Minutes per session: None    Stress: None   Relationships    Social connections:     Talks on phone: None     Gets together: None     Attends Taoism service: None     Active member of club or organization: None     Attends meetings of clubs or organizations: None     Relationship status: None    Intimate partner violence:     Fear of current or ex partner: None     Emotionally abused: None     Physically abused: None     Forced sexual activity: None   Other Topics Concern    None   Social History Narrative    None           REVIEW OF SYSTEMS      Allergies   Allergen Reactions    Shellfish-Derived Products      Hives, nausea GEOFFREY SHEPARD, NIALL - CNP on 9/4/2019 at 2:03 PM

## 2019-09-05 LAB
EKG ATRIAL RATE: 58 BPM
EKG P AXIS: 72 DEGREES
EKG P-R INTERVAL: 218 MS
EKG Q-T INTERVAL: 470 MS
EKG QRS DURATION: 88 MS
EKG QTC CALCULATION (BAZETT): 461 MS
EKG R AXIS: 12 DEGREES
EKG T AXIS: 14 DEGREES
EKG VENTRICULAR RATE: 58 BPM

## 2019-09-05 PROCEDURE — 93010 ELECTROCARDIOGRAM REPORT: CPT | Performed by: INTERNAL MEDICINE

## 2019-09-06 LAB
CULTURE: ABNORMAL
Lab: ABNORMAL
SPECIMEN DESCRIPTION: ABNORMAL

## 2019-09-16 RX ORDER — KETOROLAC TROMETHAMINE 30 MG/ML
30 INJECTION, SOLUTION INTRAMUSCULAR; INTRAVENOUS EVERY 6 HOURS
Status: CANCELLED | OUTPATIENT
Start: 2019-09-17 | End: 2019-09-18

## 2019-09-17 ENCOUNTER — APPOINTMENT (OUTPATIENT)
Dept: GENERAL RADIOLOGY | Age: 64
DRG: 470 | End: 2019-09-17
Attending: ORTHOPAEDIC SURGERY
Payer: MEDICARE

## 2019-09-17 ENCOUNTER — ANESTHESIA EVENT (OUTPATIENT)
Dept: OPERATING ROOM | Age: 64
DRG: 470 | End: 2019-09-17
Payer: MEDICARE

## 2019-09-17 ENCOUNTER — ANESTHESIA (OUTPATIENT)
Dept: OPERATING ROOM | Age: 64
DRG: 470 | End: 2019-09-17
Payer: MEDICARE

## 2019-09-17 ENCOUNTER — HOSPITAL ENCOUNTER (INPATIENT)
Age: 64
LOS: 3 days | Discharge: NURSING, REHAB OR LONG-TERM CARE | DRG: 470 | End: 2019-09-20
Attending: ORTHOPAEDIC SURGERY | Admitting: ORTHOPAEDIC SURGERY
Payer: MEDICARE

## 2019-09-17 VITALS — SYSTOLIC BLOOD PRESSURE: 116 MMHG | DIASTOLIC BLOOD PRESSURE: 67 MMHG | OXYGEN SATURATION: 100 % | TEMPERATURE: 95.7 F

## 2019-09-17 DIAGNOSIS — M17.0 PRIMARY OSTEOARTHRITIS OF BOTH KNEES: Primary | ICD-10-CM

## 2019-09-17 PROBLEM — M17.12 PRIMARY OSTEOARTHRITIS OF LEFT KNEE: Status: ACTIVE | Noted: 2019-09-17

## 2019-09-17 LAB
CREAT SERPL-MCNC: 1.6 MG/DL (ref 0.5–0.9)
GFR AFRICAN AMERICAN: 39 ML/MIN
GFR NON-AFRICAN AMERICAN: 32 ML/MIN
GFR SERPL CREATININE-BSD FRML MDRD: ABNORMAL ML/MIN/{1.73_M2}
GFR SERPL CREATININE-BSD FRML MDRD: ABNORMAL ML/MIN/{1.73_M2}

## 2019-09-17 PROCEDURE — 6360000002 HC RX W HCPCS: Performed by: ORTHOPAEDIC SURGERY

## 2019-09-17 PROCEDURE — 6370000000 HC RX 637 (ALT 250 FOR IP): Performed by: ORTHOPAEDIC SURGERY

## 2019-09-17 PROCEDURE — 97530 THERAPEUTIC ACTIVITIES: CPT

## 2019-09-17 PROCEDURE — 6360000002 HC RX W HCPCS: Performed by: ANESTHESIOLOGY

## 2019-09-17 PROCEDURE — 3600000013 HC SURGERY LEVEL 3 ADDTL 15MIN: Performed by: ORTHOPAEDIC SURGERY

## 2019-09-17 PROCEDURE — C1776 JOINT DEVICE (IMPLANTABLE): HCPCS | Performed by: ORTHOPAEDIC SURGERY

## 2019-09-17 PROCEDURE — 3700000001 HC ADD 15 MINUTES (ANESTHESIA): Performed by: ORTHOPAEDIC SURGERY

## 2019-09-17 PROCEDURE — 97162 PT EVAL MOD COMPLEX 30 MIN: CPT

## 2019-09-17 PROCEDURE — 7100000001 HC PACU RECOVERY - ADDTL 15 MIN: Performed by: ORTHOPAEDIC SURGERY

## 2019-09-17 PROCEDURE — 82565 ASSAY OF CREATININE: CPT

## 2019-09-17 PROCEDURE — 2709999900 HC NON-CHARGEABLE SUPPLY: Performed by: ORTHOPAEDIC SURGERY

## 2019-09-17 PROCEDURE — 64448 NJX AA&/STRD FEM NRV NFS IMG: CPT | Performed by: ANESTHESIOLOGY

## 2019-09-17 PROCEDURE — 1200000000 HC SEMI PRIVATE

## 2019-09-17 PROCEDURE — 3600000003 HC SURGERY LEVEL 3 BASE: Performed by: ORTHOPAEDIC SURGERY

## 2019-09-17 PROCEDURE — 27447 TOTAL KNEE ARTHROPLASTY: CPT | Performed by: ORTHOPAEDIC SURGERY

## 2019-09-17 PROCEDURE — 2720000010 HC SURG SUPPLY STERILE: Performed by: ORTHOPAEDIC SURGERY

## 2019-09-17 PROCEDURE — 6360000002 HC RX W HCPCS: Performed by: NURSE ANESTHETIST, CERTIFIED REGISTERED

## 2019-09-17 PROCEDURE — C1713 ANCHOR/SCREW BN/BN,TIS/BN: HCPCS | Performed by: ORTHOPAEDIC SURGERY

## 2019-09-17 PROCEDURE — 2580000003 HC RX 258: Performed by: NURSE ANESTHETIST, CERTIFIED REGISTERED

## 2019-09-17 PROCEDURE — 2580000003 HC RX 258: Performed by: ANESTHESIOLOGY

## 2019-09-17 PROCEDURE — 0SRD0J9 REPLACEMENT OF LEFT KNEE JOINT WITH SYNTHETIC SUBSTITUTE, CEMENTED, OPEN APPROACH: ICD-10-PCS | Performed by: ORTHOPAEDIC SURGERY

## 2019-09-17 PROCEDURE — 6370000000 HC RX 637 (ALT 250 FOR IP): Performed by: FAMILY MEDICINE

## 2019-09-17 PROCEDURE — 97166 OT EVAL MOD COMPLEX 45 MIN: CPT

## 2019-09-17 PROCEDURE — 7100000000 HC PACU RECOVERY - FIRST 15 MIN: Performed by: ORTHOPAEDIC SURGERY

## 2019-09-17 PROCEDURE — 2580000003 HC RX 258: Performed by: ORTHOPAEDIC SURGERY

## 2019-09-17 PROCEDURE — 2500000003 HC RX 250 WO HCPCS: Performed by: ORTHOPAEDIC SURGERY

## 2019-09-17 PROCEDURE — 73560 X-RAY EXAM OF KNEE 1 OR 2: CPT

## 2019-09-17 PROCEDURE — 2500000003 HC RX 250 WO HCPCS: Performed by: NURSE ANESTHETIST, CERTIFIED REGISTERED

## 2019-09-17 PROCEDURE — 3700000000 HC ANESTHESIA ATTENDED CARE: Performed by: ORTHOPAEDIC SURGERY

## 2019-09-17 PROCEDURE — 3E0T3BZ INTRODUCTION OF ANESTHETIC AGENT INTO PERIPHERAL NERVES AND PLEXI, PERCUTANEOUS APPROACH: ICD-10-PCS | Performed by: ANESTHESIOLOGY

## 2019-09-17 PROCEDURE — 36415 COLL VENOUS BLD VENIPUNCTURE: CPT

## 2019-09-17 DEVICE — PIN FIX POLY LOK OSS: Type: IMPLANTABLE DEVICE | Site: KNEE | Status: FUNCTIONAL

## 2019-09-17 DEVICE — AXLE FEM STD KNEE OSS: Type: IMPLANTABLE DEVICE | Site: KNEE | Status: FUNCTIONAL

## 2019-09-17 DEVICE — CEMENT BNE 40GM W/ GENT HI VISC RADPQ FOR REV SURG: Type: IMPLANTABLE DEVICE | Site: KNEE | Status: FUNCTIONAL

## 2019-09-17 DEVICE — BEARING TIB THK12MM KNEE UHMWPE ORTH SALV SYS: Type: IMPLANTABLE DEVICE | Site: KNEE | Status: FUNCTIONAL

## 2019-09-17 DEVICE — IMPLANTABLE DEVICE: Type: IMPLANTABLE DEVICE | Site: KNEE | Status: FUNCTIONAL

## 2019-09-17 DEVICE — BUSHING FEM KNEE ARCM POLY LO FRIC INTFACE AXLE AND REINF: Type: IMPLANTABLE DEVICE | Site: KNEE | Status: FUNCTIONAL

## 2019-09-17 DEVICE — COMPONENT FEM KNEE YOKE REINF ORTH SALV SYS: Type: IMPLANTABLE DEVICE | Site: KNEE | Status: FUNCTIONAL

## 2019-09-17 DEVICE — BUSHING TIB POLY LO FRIC INTFACE OSS: Type: IMPLANTABLE DEVICE | Site: KNEE | Status: FUNCTIONAL

## 2019-09-17 DEVICE — BASEPLATE TIB L75MM LNG KNEE CO CHROM NONMODULAR OSS: Type: IMPLANTABLE DEVICE | Site: KNEE | Status: FUNCTIONAL

## 2019-09-17 DEVICE — COMPONENT PAT DIA34MM THK7.8MM THN KNEE POLY 3 PEG SER A: Type: IMPLANTABLE DEVICE | Site: KNEE | Status: FUNCTIONAL

## 2019-09-17 RX ORDER — DEXAMETHASONE SODIUM PHOSPHATE 4 MG/ML
INJECTION, SOLUTION INTRA-ARTICULAR; INTRALESIONAL; INTRAMUSCULAR; INTRAVENOUS; SOFT TISSUE PRN
Status: DISCONTINUED | OUTPATIENT
Start: 2019-09-17 | End: 2019-09-17 | Stop reason: SDUPTHER

## 2019-09-17 RX ORDER — OMEPRAZOLE 20 MG/1
20 CAPSULE, DELAYED RELEASE ORAL DAILY
Status: DISCONTINUED | OUTPATIENT
Start: 2019-09-17 | End: 2019-09-19

## 2019-09-17 RX ORDER — SCOLOPAMINE TRANSDERMAL SYSTEM 1 MG/1
1 PATCH, EXTENDED RELEASE TRANSDERMAL ONCE
Status: DISCONTINUED | OUTPATIENT
Start: 2019-09-17 | End: 2019-09-20

## 2019-09-17 RX ORDER — LABETALOL 20 MG/4 ML (5 MG/ML) INTRAVENOUS SYRINGE
5 EVERY 10 MIN PRN
Status: DISCONTINUED | OUTPATIENT
Start: 2019-09-17 | End: 2019-09-17 | Stop reason: HOSPADM

## 2019-09-17 RX ORDER — SPIRONOLACTONE 25 MG/1
50 TABLET ORAL DAILY
Status: DISCONTINUED | OUTPATIENT
Start: 2019-09-17 | End: 2019-09-19

## 2019-09-17 RX ORDER — ONDANSETRON 2 MG/ML
4 INJECTION INTRAMUSCULAR; INTRAVENOUS EVERY 6 HOURS PRN
Status: DISCONTINUED | OUTPATIENT
Start: 2019-09-17 | End: 2019-09-20 | Stop reason: HOSPADM

## 2019-09-17 RX ORDER — ROPIVACAINE HYDROCHLORIDE 5 MG/ML
INJECTION, SOLUTION EPIDURAL; INFILTRATION; PERINEURAL
Status: COMPLETED | OUTPATIENT
Start: 2019-09-17 | End: 2019-09-17

## 2019-09-17 RX ORDER — OXYCODONE HYDROCHLORIDE 10 MG/1
10 TABLET ORAL EVERY 4 HOURS PRN
Status: DISCONTINUED | OUTPATIENT
Start: 2019-09-17 | End: 2019-09-18

## 2019-09-17 RX ORDER — PROPOFOL 10 MG/ML
INJECTION, EMULSION INTRAVENOUS PRN
Status: DISCONTINUED | OUTPATIENT
Start: 2019-09-17 | End: 2019-09-17 | Stop reason: SDUPTHER

## 2019-09-17 RX ORDER — CALCIUM CHLORIDE 100 MG/ML
INJECTION INTRAVENOUS; INTRAVENTRICULAR PRN
Status: DISCONTINUED | OUTPATIENT
Start: 2019-09-17 | End: 2019-09-17 | Stop reason: ALTCHOICE

## 2019-09-17 RX ORDER — TRANEXAMIC ACID 100 MG/ML
INJECTION, SOLUTION INTRAVENOUS PRN
Status: DISCONTINUED | OUTPATIENT
Start: 2019-09-17 | End: 2019-09-17 | Stop reason: SDUPTHER

## 2019-09-17 RX ORDER — CIPROFLOXACIN 500 MG/1
500 TABLET, FILM COATED ORAL 2 TIMES DAILY
Status: DISCONTINUED | OUTPATIENT
Start: 2019-09-17 | End: 2019-09-18

## 2019-09-17 RX ORDER — FUROSEMIDE 40 MG/1
40 TABLET ORAL DAILY
Status: DISCONTINUED | OUTPATIENT
Start: 2019-09-17 | End: 2019-09-20 | Stop reason: HOSPADM

## 2019-09-17 RX ORDER — FENTANYL CITRATE 50 UG/ML
25 INJECTION, SOLUTION INTRAMUSCULAR; INTRAVENOUS EVERY 5 MIN PRN
Status: DISCONTINUED | OUTPATIENT
Start: 2019-09-17 | End: 2019-09-17 | Stop reason: HOSPADM

## 2019-09-17 RX ORDER — MIDAZOLAM HYDROCHLORIDE 1 MG/ML
INJECTION INTRAMUSCULAR; INTRAVENOUS PRN
Status: DISCONTINUED | OUTPATIENT
Start: 2019-09-17 | End: 2019-09-17 | Stop reason: SDUPTHER

## 2019-09-17 RX ORDER — SODIUM CHLORIDE, SODIUM LACTATE, POTASSIUM CHLORIDE, CALCIUM CHLORIDE 600; 310; 30; 20 MG/100ML; MG/100ML; MG/100ML; MG/100ML
INJECTION, SOLUTION INTRAVENOUS CONTINUOUS
Status: DISCONTINUED | OUTPATIENT
Start: 2019-09-17 | End: 2019-09-17

## 2019-09-17 RX ORDER — MORPHINE SULFATE 2 MG/ML
2 INJECTION, SOLUTION INTRAMUSCULAR; INTRAVENOUS EVERY 5 MIN PRN
Status: DISCONTINUED | OUTPATIENT
Start: 2019-09-17 | End: 2019-09-17 | Stop reason: HOSPADM

## 2019-09-17 RX ORDER — DOCUSATE SODIUM 100 MG/1
100 CAPSULE, LIQUID FILLED ORAL 2 TIMES DAILY
Status: DISCONTINUED | OUTPATIENT
Start: 2019-09-17 | End: 2019-09-20 | Stop reason: HOSPADM

## 2019-09-17 RX ORDER — CALCIUM CARBONATE 200(500)MG
500 TABLET,CHEWABLE ORAL 3 TIMES DAILY
Status: DISCONTINUED | OUTPATIENT
Start: 2019-09-17 | End: 2019-09-20 | Stop reason: HOSPADM

## 2019-09-17 RX ORDER — SODIUM CHLORIDE 9 MG/ML
INJECTION, SOLUTION INTRAVENOUS CONTINUOUS PRN
Status: DISCONTINUED | OUTPATIENT
Start: 2019-09-17 | End: 2019-09-17 | Stop reason: SDUPTHER

## 2019-09-17 RX ORDER — HYDROCODONE BITARTRATE AND ACETAMINOPHEN 5; 325 MG/1; MG/1
1 TABLET ORAL PRN
Status: DISCONTINUED | OUTPATIENT
Start: 2019-09-17 | End: 2019-09-17 | Stop reason: HOSPADM

## 2019-09-17 RX ORDER — KETOROLAC TROMETHAMINE 30 MG/ML
15 INJECTION, SOLUTION INTRAMUSCULAR; INTRAVENOUS EVERY 8 HOURS SCHEDULED
Status: DISCONTINUED | OUTPATIENT
Start: 2019-09-17 | End: 2019-09-18

## 2019-09-17 RX ORDER — ONDANSETRON 2 MG/ML
INJECTION INTRAMUSCULAR; INTRAVENOUS PRN
Status: DISCONTINUED | OUTPATIENT
Start: 2019-09-17 | End: 2019-09-17 | Stop reason: SDUPTHER

## 2019-09-17 RX ORDER — DIPHENHYDRAMINE HYDROCHLORIDE 50 MG/ML
12.5 INJECTION INTRAMUSCULAR; INTRAVENOUS
Status: DISCONTINUED | OUTPATIENT
Start: 2019-09-17 | End: 2019-09-17 | Stop reason: HOSPADM

## 2019-09-17 RX ORDER — ACETAMINOPHEN 500 MG
1000 TABLET ORAL ONCE
Status: COMPLETED | OUTPATIENT
Start: 2019-09-17 | End: 2019-09-17

## 2019-09-17 RX ORDER — SODIUM CHLORIDE, SODIUM LACTATE, POTASSIUM CHLORIDE, CALCIUM CHLORIDE 600; 310; 30; 20 MG/100ML; MG/100ML; MG/100ML; MG/100ML
INJECTION, SOLUTION INTRAVENOUS CONTINUOUS
Status: DISCONTINUED | OUTPATIENT
Start: 2019-09-17 | End: 2019-09-20 | Stop reason: HOSPADM

## 2019-09-17 RX ORDER — ACETAMINOPHEN 500 MG
1000 TABLET ORAL EVERY 8 HOURS SCHEDULED
Status: DISPENSED | OUTPATIENT
Start: 2019-09-17 | End: 2019-09-19

## 2019-09-17 RX ORDER — METOCLOPRAMIDE HYDROCHLORIDE 5 MG/ML
10 INJECTION INTRAMUSCULAR; INTRAVENOUS
Status: COMPLETED | OUTPATIENT
Start: 2019-09-17 | End: 2019-09-17

## 2019-09-17 RX ORDER — ONDANSETRON 2 MG/ML
4 INJECTION INTRAMUSCULAR; INTRAVENOUS
Status: DISCONTINUED | OUTPATIENT
Start: 2019-09-17 | End: 2019-09-17 | Stop reason: HOSPADM

## 2019-09-17 RX ORDER — MEPERIDINE HYDROCHLORIDE 25 MG/ML
12.5 INJECTION INTRAMUSCULAR; INTRAVENOUS; SUBCUTANEOUS EVERY 5 MIN PRN
Status: DISCONTINUED | OUTPATIENT
Start: 2019-09-17 | End: 2019-09-17 | Stop reason: HOSPADM

## 2019-09-17 RX ORDER — LIDOCAINE HYDROCHLORIDE 10 MG/ML
INJECTION, SOLUTION EPIDURAL; INFILTRATION; INTRACAUDAL; PERINEURAL PRN
Status: DISCONTINUED | OUTPATIENT
Start: 2019-09-17 | End: 2019-09-17 | Stop reason: SDUPTHER

## 2019-09-17 RX ORDER — GABAPENTIN 600 MG/1
600 TABLET ORAL ONCE
Status: COMPLETED | OUTPATIENT
Start: 2019-09-17 | End: 2019-09-17

## 2019-09-17 RX ORDER — SODIUM CHLORIDE 0.9 % (FLUSH) 0.9 %
10 SYRINGE (ML) INJECTION EVERY 12 HOURS SCHEDULED
Status: DISCONTINUED | OUTPATIENT
Start: 2019-09-17 | End: 2019-09-20 | Stop reason: HOSPADM

## 2019-09-17 RX ORDER — SODIUM CHLORIDE 0.9 % (FLUSH) 0.9 %
10 SYRINGE (ML) INJECTION PRN
Status: DISCONTINUED | OUTPATIENT
Start: 2019-09-17 | End: 2019-09-20 | Stop reason: HOSPADM

## 2019-09-17 RX ORDER — HYDRALAZINE HYDROCHLORIDE 20 MG/ML
5 INJECTION INTRAMUSCULAR; INTRAVENOUS EVERY 10 MIN PRN
Status: DISCONTINUED | OUTPATIENT
Start: 2019-09-17 | End: 2019-09-17 | Stop reason: HOSPADM

## 2019-09-17 RX ORDER — DEXAMETHASONE SODIUM PHOSPHATE 10 MG/ML
10 INJECTION, SOLUTION INTRAMUSCULAR; INTRAVENOUS ONCE
Status: COMPLETED | OUTPATIENT
Start: 2019-09-17 | End: 2019-09-17

## 2019-09-17 RX ORDER — ROCURONIUM BROMIDE 10 MG/ML
INJECTION, SOLUTION INTRAVENOUS PRN
Status: DISCONTINUED | OUTPATIENT
Start: 2019-09-17 | End: 2019-09-17 | Stop reason: SDUPTHER

## 2019-09-17 RX ORDER — FENTANYL CITRATE 50 UG/ML
INJECTION, SOLUTION INTRAMUSCULAR; INTRAVENOUS PRN
Status: DISCONTINUED | OUTPATIENT
Start: 2019-09-17 | End: 2019-09-17 | Stop reason: SDUPTHER

## 2019-09-17 RX ORDER — FERROUS SULFATE 325(65) MG
325 TABLET ORAL
Status: DISCONTINUED | OUTPATIENT
Start: 2019-09-18 | End: 2019-09-20 | Stop reason: HOSPADM

## 2019-09-17 RX ORDER — FENTANYL CITRATE 50 UG/ML
50 INJECTION, SOLUTION INTRAMUSCULAR; INTRAVENOUS EVERY 5 MIN PRN
Status: DISCONTINUED | OUTPATIENT
Start: 2019-09-17 | End: 2019-09-17 | Stop reason: HOSPADM

## 2019-09-17 RX ORDER — OXYCODONE HYDROCHLORIDE 5 MG/1
5 TABLET ORAL EVERY 4 HOURS PRN
Status: DISCONTINUED | OUTPATIENT
Start: 2019-09-17 | End: 2019-09-18

## 2019-09-17 RX ORDER — HYDROCODONE BITARTRATE AND ACETAMINOPHEN 5; 325 MG/1; MG/1
2 TABLET ORAL PRN
Status: DISCONTINUED | OUTPATIENT
Start: 2019-09-17 | End: 2019-09-17 | Stop reason: HOSPADM

## 2019-09-17 RX ADMIN — FENTANYL CITRATE 100 MCG: 50 INJECTION, SOLUTION INTRAMUSCULAR; INTRAVENOUS at 11:36

## 2019-09-17 RX ADMIN — MIDAZOLAM 2 MG: 1 INJECTION INTRAMUSCULAR; INTRAVENOUS at 10:40

## 2019-09-17 RX ADMIN — LIDOCAINE HYDROCHLORIDE 500 MG: 10 INJECTION, SOLUTION EPIDURAL; INFILTRATION; INTRACAUDAL at 11:36

## 2019-09-17 RX ADMIN — TRANEXAMIC ACID 1000 MG: 100 INJECTION, SOLUTION INTRAVENOUS at 13:15

## 2019-09-17 RX ADMIN — PROPOFOL 150 MG: 10 INJECTION, EMULSION INTRAVENOUS at 11:36

## 2019-09-17 RX ADMIN — FENTANYL CITRATE 50 MCG: 50 INJECTION, SOLUTION INTRAMUSCULAR; INTRAVENOUS at 13:38

## 2019-09-17 RX ADMIN — GABAPENTIN 600 MG: 600 TABLET, FILM COATED ORAL at 09:48

## 2019-09-17 RX ADMIN — METOCLOPRAMIDE 10 MG: 5 INJECTION, SOLUTION INTRAMUSCULAR; INTRAVENOUS at 14:25

## 2019-09-17 RX ADMIN — FUROSEMIDE 40 MG: 40 TABLET ORAL at 17:54

## 2019-09-17 RX ADMIN — DEXAMETHASONE SODIUM PHOSPHATE 10 MG: 10 INJECTION, SOLUTION INTRAMUSCULAR; INTRAVENOUS at 09:47

## 2019-09-17 RX ADMIN — ANTACID TABLETS 500 MG: 500 TABLET, CHEWABLE ORAL at 20:58

## 2019-09-17 RX ADMIN — TRANEXAMIC ACID 1000 MG: 100 INJECTION, SOLUTION INTRAVENOUS at 11:48

## 2019-09-17 RX ADMIN — SODIUM CHLORIDE, POTASSIUM CHLORIDE, SODIUM LACTATE AND CALCIUM CHLORIDE: 600; 310; 30; 20 INJECTION, SOLUTION INTRAVENOUS at 16:28

## 2019-09-17 RX ADMIN — DOCUSATE SODIUM 100 MG: 100 CAPSULE, LIQUID FILLED ORAL at 20:58

## 2019-09-17 RX ADMIN — DEXAMETHASONE SODIUM PHOSPHATE 4 MG: 4 INJECTION, SOLUTION INTRA-ARTICULAR; INTRALESIONAL; INTRAMUSCULAR; INTRAVENOUS; SOFT TISSUE at 11:42

## 2019-09-17 RX ADMIN — ROCURONIUM BROMIDE 50 MG: 10 INJECTION, SOLUTION INTRAVENOUS at 11:36

## 2019-09-17 RX ADMIN — SPIRONOLACTONE 50 MG: 25 TABLET, FILM COATED ORAL at 17:54

## 2019-09-17 RX ADMIN — OMEPRAZOLE 20 MG: 20 CAPSULE, DELAYED RELEASE ORAL at 17:54

## 2019-09-17 RX ADMIN — KETOROLAC TROMETHAMINE 15 MG: 30 INJECTION, SOLUTION INTRAMUSCULAR at 16:27

## 2019-09-17 RX ADMIN — KETOROLAC TROMETHAMINE 15 MG: 30 INJECTION, SOLUTION INTRAMUSCULAR at 20:59

## 2019-09-17 RX ADMIN — ONDANSETRON 4 MG: 2 INJECTION INTRAMUSCULAR; INTRAVENOUS at 13:36

## 2019-09-17 RX ADMIN — ACETAMINOPHEN 1000 MG: 500 TABLET, FILM COATED ORAL at 20:58

## 2019-09-17 RX ADMIN — ACETAMINOPHEN 1000 MG: 500 TABLET, FILM COATED ORAL at 16:28

## 2019-09-17 RX ADMIN — CIPROFLOXACIN 500 MG: 500 TABLET, FILM COATED ORAL at 20:58

## 2019-09-17 RX ADMIN — SUGAMMADEX 200 MG: 100 INJECTION, SOLUTION INTRAVENOUS at 13:32

## 2019-09-17 RX ADMIN — SODIUM CHLORIDE: 9 INJECTION, SOLUTION INTRAVENOUS at 13:33

## 2019-09-17 RX ADMIN — ROPIVACAINE HYDROCHLORIDE 20 ML: 5 INJECTION, SOLUTION EPIDURAL; INFILTRATION; PERINEURAL at 10:46

## 2019-09-17 RX ADMIN — FENTANYL CITRATE 50 MCG: 50 INJECTION, SOLUTION INTRAMUSCULAR; INTRAVENOUS at 13:03

## 2019-09-17 RX ADMIN — SERTRALINE HYDROCHLORIDE 50 MG: 50 TABLET ORAL at 20:58

## 2019-09-17 RX ADMIN — ANTACID TABLETS 500 MG: 500 TABLET, CHEWABLE ORAL at 17:53

## 2019-09-17 RX ADMIN — HYDROMORPHONE HYDROCHLORIDE 0.5 MG: 1 INJECTION, SOLUTION INTRAMUSCULAR; INTRAVENOUS; SUBCUTANEOUS at 14:31

## 2019-09-17 RX ADMIN — SODIUM CHLORIDE, POTASSIUM CHLORIDE, SODIUM LACTATE AND CALCIUM CHLORIDE: 600; 310; 30; 20 INJECTION, SOLUTION INTRAVENOUS at 09:41

## 2019-09-17 RX ADMIN — Medication 1500 G: at 11:46

## 2019-09-17 RX ADMIN — VANCOMYCIN HYDROCHLORIDE 1000 MG: 1 INJECTION, POWDER, LYOPHILIZED, FOR SOLUTION INTRAVENOUS at 22:54

## 2019-09-17 RX ADMIN — ACETAMINOPHEN 1000 MG: 500 TABLET, FILM COATED ORAL at 09:47

## 2019-09-17 ASSESSMENT — PULMONARY FUNCTION TESTS
PIF_VALUE: 23
PIF_VALUE: 22
PIF_VALUE: 21
PIF_VALUE: 22
PIF_VALUE: 19
PIF_VALUE: 23
PIF_VALUE: 27
PIF_VALUE: 18
PIF_VALUE: 24
PIF_VALUE: 21
PIF_VALUE: 18
PIF_VALUE: 24
PIF_VALUE: 22
PIF_VALUE: 26
PIF_VALUE: 24
PIF_VALUE: 22
PIF_VALUE: 25
PIF_VALUE: 24
PIF_VALUE: 22
PIF_VALUE: 26
PIF_VALUE: 21
PIF_VALUE: 21
PIF_VALUE: 24
PIF_VALUE: 1
PIF_VALUE: 22
PIF_VALUE: 25
PIF_VALUE: 23
PIF_VALUE: 4
PIF_VALUE: 2
PIF_VALUE: 25
PIF_VALUE: 19
PIF_VALUE: 27
PIF_VALUE: 23
PIF_VALUE: 26
PIF_VALUE: 25
PIF_VALUE: 18
PIF_VALUE: 25
PIF_VALUE: 23
PIF_VALUE: 24
PIF_VALUE: 18
PIF_VALUE: 24
PIF_VALUE: 21
PIF_VALUE: 25
PIF_VALUE: 22
PIF_VALUE: 23
PIF_VALUE: 24
PIF_VALUE: 25
PIF_VALUE: 27
PIF_VALUE: 20
PIF_VALUE: 23
PIF_VALUE: 23
PIF_VALUE: 25
PIF_VALUE: 17
PIF_VALUE: 23
PIF_VALUE: 25
PIF_VALUE: 24
PIF_VALUE: 19
PIF_VALUE: 25
PIF_VALUE: 22
PIF_VALUE: 25
PIF_VALUE: 23
PIF_VALUE: 18
PIF_VALUE: 27
PIF_VALUE: 24
PIF_VALUE: 21
PIF_VALUE: 25
PIF_VALUE: 7
PIF_VALUE: 23
PIF_VALUE: 3
PIF_VALUE: 18
PIF_VALUE: 20
PIF_VALUE: 18
PIF_VALUE: 23
PIF_VALUE: 23
PIF_VALUE: 21
PIF_VALUE: 21
PIF_VALUE: 26
PIF_VALUE: 23
PIF_VALUE: 24
PIF_VALUE: 23
PIF_VALUE: 26
PIF_VALUE: 22
PIF_VALUE: 23
PIF_VALUE: 1
PIF_VALUE: 23
PIF_VALUE: 24
PIF_VALUE: 24
PIF_VALUE: 21
PIF_VALUE: 0
PIF_VALUE: 24
PIF_VALUE: 25
PIF_VALUE: 4
PIF_VALUE: 2
PIF_VALUE: 25
PIF_VALUE: 25
PIF_VALUE: 23
PIF_VALUE: 25
PIF_VALUE: 18
PIF_VALUE: 28
PIF_VALUE: 23
PIF_VALUE: 1
PIF_VALUE: 25
PIF_VALUE: 23
PIF_VALUE: 18
PIF_VALUE: 25
PIF_VALUE: 23
PIF_VALUE: 34
PIF_VALUE: 24
PIF_VALUE: 24
PIF_VALUE: 25
PIF_VALUE: 25
PIF_VALUE: 2
PIF_VALUE: 25
PIF_VALUE: 23
PIF_VALUE: 23
PIF_VALUE: 24
PIF_VALUE: 23
PIF_VALUE: 21
PIF_VALUE: 21
PIF_VALUE: 23
PIF_VALUE: 25
PIF_VALUE: 23
PIF_VALUE: 22
PIF_VALUE: 22
PIF_VALUE: 25

## 2019-09-17 ASSESSMENT — PAIN SCALES - GENERAL
PAINLEVEL_OUTOF10: 7
PAINLEVEL_OUTOF10: 7
PAINLEVEL_OUTOF10: 8
PAINLEVEL_OUTOF10: 2
PAINLEVEL_OUTOF10: 7
PAINLEVEL_OUTOF10: 7
PAINLEVEL_OUTOF10: 5

## 2019-09-17 ASSESSMENT — PAIN DESCRIPTION - LOCATION
LOCATION: KNEE;LEG
LOCATION: KNEE

## 2019-09-17 ASSESSMENT — PAIN DESCRIPTION - ORIENTATION
ORIENTATION: LEFT

## 2019-09-17 ASSESSMENT — PAIN DESCRIPTION - PROGRESSION: CLINICAL_PROGRESSION: NOT CHANGED

## 2019-09-17 ASSESSMENT — PAIN DESCRIPTION - PAIN TYPE
TYPE: SURGICAL PAIN
TYPE: SURGICAL PAIN

## 2019-09-17 ASSESSMENT — ENCOUNTER SYMPTOMS: STRIDOR: 0

## 2019-09-17 ASSESSMENT — PAIN DESCRIPTION - FREQUENCY: FREQUENCY: CONTINUOUS

## 2019-09-17 ASSESSMENT — PAIN DESCRIPTION - DESCRIPTORS
DESCRIPTORS: ACHING
DESCRIPTORS: ACHING

## 2019-09-17 ASSESSMENT — PAIN - FUNCTIONAL ASSESSMENT: PAIN_FUNCTIONAL_ASSESSMENT: 0-10

## 2019-09-17 NOTE — CARE COORDINATION
CASE MANAGEMENT NOTE:    Admission Date:  9/17/2019 Fidel Matias is a 70 y.o.  female    Admitted for : Primary osteoarthritis of left knee [M17.12]    Met with:  Patient    PCP:  Mica Cronin 101:  Medicare      Current Residence/ Living Arrangements:  independently at home             Current Services PTA:  Yes, current with [facility] and does not want them again, she would like [facility] care, the  Service her sister has used. Is patient agreeable to VNS: Yes    Freedom of choice provided: No    List of [address] provided: No    VNS chosen:  Yes    DME:  walker    Home Oxygen: No    Nebulizer: No    CPAP/BIPAP: No    Supplier: N/A    Potential Assistance Needed: No    SNF needed: Yes    Pharmacy:  [facility]       Is the Patient an CAT RIDDLE [facility] with Readmission Risk Score greater than 14%? No  If yes, pt needs a follow up appointment made within 7 days. Does Patient want to use MEDS to BEDS? No    Family Members/Caregivers that pt would like involved in their care:    Yes    If yes, list name here:  Sister Jewell    Transportation Provider:  Family             Is patient in Isolation/One on One/Altered Mental Status? Yes  If yes, skip next question. If no, would they like an I-Pad to  use? No  If yes, call [phone]. Discharge Plan:  9/17/19 - Medicare - Patient is for home, Has a walker, Current with [address] and no longer wants them, would like [facility] instead, her sister currently uses them. Patient would like to go to rehab 1) ARU, 2) [facility]. S/p Left TKA. , Angelia needs singed and completed. LSW follow for rehab.  .//pf           Electronically signed by: Courtney Mercer RN on 9/17/2019 at 4:26 PM

## 2019-09-17 NOTE — ANESTHESIA POSTPROCEDURE EVALUATION
POST- ANESTHESIA EVALUATION       Pt Name: Katherine Irizarry  MRN: [id number]  YOB: 1955  Date of evaluation: 9/17/2019  Time:  2:13 PM      /70   Pulse 57   Temp 97 °F (36.1 °C) (Oral)   Resp 16   Ht 5' 6\" (1.676 m)   Wt (!) 340 lb (154.2 kg)   SpO2 100%   BMI 54.88 kg/m²      Consciousness Level  Awake  Cardiopulmonary Status  Stable  Pain Adequately Treated YES  Nausea / Vomiting  NO  Adequate Hydration  YES  Anesthesia Related Complications NONE      Electronically signed by Flavia Peterson MD on 9/17/2019 at 2:13 PM       Department of Anesthesiology  Postprocedure Note    Patient: Katherine Irizarry  MRN: [id number]  YOB: 1955  Date of evaluation: 9/17/2019  Time:  2:13 PM     Procedure Summary     Date:  09/17/19 Room / Location:  [address] [place] [address] [address] / [id number]    Anesthesia Start:  1132 Anesthesia Stop:  1352    Procedure:  KNEE TOTAL ARTHROPLASTY (Left Knee) Diagnosis:  (DJD LEFT KNEE)    Surgeon:  Brian Gould MD Responsible Provider:  Flavia Peterson MD    Anesthesia Type:  general, regional ASA Status:  3          Anesthesia Type: general, regional    Lori Phase I:      Lori Phase II:      Last vitals: Reviewed and per EMR flowsheets.        Anesthesia Post Evaluation

## 2019-09-17 NOTE — PROGRESS NOTES
Assumed patient care at 0700. Received report from night shift. Pt is on legal hold with q15 obs. Patient allowed cell phone at bedside per active order. Assessment completed. Pt A&Ox4. POC discussed with pt, verbalizes understanding, pt is anxious to be reevaluated by psych and have hold released. Pt denies pain and nausea. Bed alarm not indicated, pt is up self, steady gait. Personal possessions and call light placed within reach. Pt denies any additional needs at this time. Pt is resting comfortably in bed. Will continue to monitor closely.    Descriptors: Aching  Vital Signs  Patient Currently in Pain: Yes       Orientation  Orientation  Overall Orientation Status: Within Functional Limits  Social/Functional History  Social/Functional History  Lives With: Alone  Type of Home: House(Senior living community)  Home Layout: One level  Home Access: Level entry  Bathroom Shower/Tub: Tub/Shower unit  Bathroom Toilet: Handicap height  Bathroom Equipment: Grab bars in shower, Tub transfer bench, Hand-held shower  Bathroom Accessibility: Walker accessible  Home Equipment: Rolling walker, Cane  ADL Assistance: Independent  Homemaking Assistance: Independent  Homemaking Responsibilities: Yes  Ambulation Assistance: Independent(w/RW)  Transfer Assistance: Independent  Active : Yes  Mode of Transportation: Car  Occupation: Retired  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 Objective     Observation/Palpation  Observation: On-Q pain ball; IV    AROM RLE (degrees)  RLE AROM: WFL  R Knee Flexion 0-145: 80  AROM LLE (degrees)  LLE AROM : WFL  L Knee Flexion 0-145: 80  L Knee Extension 0: 0(assisted)  Strength RLE  Strength RLE: WFL  Comment: grossly 3/5  Strength LLE  L Hip Flexion: 2+/5  L Knee Flexion: 3-/5  L Knee Extension: 2+/5  L Ankle Dorsiflexion: 3+/5        Bed mobility  Rolling to Left: Stand by assistance  Rolling to Right: Stand by assistance  Supine to Sit: Stand by assistance(head of bed slightly elevated and use of rail)  Sit to Supine:  Moderate assistance(to LEs with use of rail)  Scooting: Stand by assistance  Comment: pt reported dizziness with sitting  Transfers  Comment: no standing at this time due to dizziness and decreased motor control L LE  Ambulation  Ambulation?: No     Balance  Sitting - Static: Good  Sitting - Dynamic: Fair;+(SBA)        Plan   Plan  Times per week: BID  Times per day: Twice a

## 2019-09-17 NOTE — OP NOTE
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 Patient has had arthritis for years. She has barely able to ambulate. Patient's x-rays were extremely remarkable for severe destructive disease. Her left knee was essentially remarkable and that that she had significant medial tibial wear and subluxation of the femur up across the tibia. It was felt that she rouses benefit from total knee arthroplasty but given the condition was unlikely that her any her cruciates or collaterals were intact and then more likely a hinged prosthesis to be in the best interest.  She is consented with good comprehension of all risk benefits    Patient received vancomycin in the holding area to do a positive nares culture. She also had an adductor canal block to the lower extremity on the left side. She was then taken operative suite were general anesthesia was administered. She did have a tourniquet applied to left upper thigh and left lower extremities were prepped and draped in usual fashion. The leg was exsanguinated the tourniquet inflated to 370 mmHg. Timeout was called to verify laterality. A midline incision centered over the patella patella was taken down the subcu to the fascia and the retinaculum. A medial parapatellar arthrotomy was carried out and extended proximally. Dissection obvious hypertrophied synovium was taken.   Patient patella was everted and in the patellar height was appropriate satisfied with this all the trial components were removed. The prosthetic components were all assembled. Cement was prepared. Cement was then placed on both implants including the stems and these were impacted and compressed with a 12 pushing trial and the patella was also applied and clamped. The patella had slight fragmentation which was replaced back together. After the hardening the cement was verified respiratory further soft tissue cement debris which was removed. Like to go with a permanent 12 the bushings were all applied and placed into the respective components in the stem through the tibial implant and this was then seated and then the locking pin in place for the opinion. Wound was saloni irrigated after Betadine lavage the this was then sprayed with platelet rich platelet poor plasma spray. The tourniquet deflated at 60 minutes. Hemostasis was excellent the arthrotomy was closed with a running #1 strata fix augmented by several of our #5 FiberWire's. The subcu was closed with a 2-0 Monocryl strata fix and zip loop for the skin covered with Aquasol dressing.   Patient was awakened taken to postanesthesia care unit in good condition

## 2019-09-17 NOTE — DISCHARGE INSTR - COC
done    Treatments at the Time of Hospital Discharge:   Respiratory Treatments: n/a  Oxygen Therapy:  is not on home oxygen therapy. Ventilator:    - No ventilator support    Rehab Therapies: Physical Therapy and Occupational Therapy  Weight Bearing Status/Restrictions: No weight bearing restirctions  Other Medical Equipment (for information only, NOT a DME order):  walker  Other Treatments: Skilled Nursing Assessment  And medication Education    . TOTAL JOINT REPLACEMENT PATIENT SKILLED NURSING FACILITY PROTOCOL    This patient is a post-operative total joint replacement patient. Expectations for this patients care are as follows:    Goals:   Increased level of activity and ambulation each day.  Well-controlled pain.  Free from infection.  Encourage patient to provide self-care when possible.  DISCHARGE TO HOME IN 7 DAYS, OR SOONER, IF GOALS ARE MET. Activity & Diet:   Therapy to be performed twice daily. (Medicate patient 1 hr. prior to therapy.)   Up to chair for all meals.  Range of motion for TKA patients.  Hip precautions for JANINE patients.  Incentive Spirometer: 3-4 inhalations in a row, every twenty to thirty minutes, during the day, while awake.  Increase oral intake of fruits, fiber and water to prevent constipation.  Consider bowel protocol.  Increase protein intake/reduce high-sugar intake to help promote healing and prevent infection.  No pillow under the knee for TKA patients.  Terrance Hose on in the am and off in the pm.    Incision Care:   If using BEREKET dressing or Aquacel dressing - keep dressing intact until seen and removed by surgeon, unless saturated, in which case, call surgeon and request instructions. If dressing falls off, call surgeon.  Aquacel dressing is waterproof and patient may shower as of POD #1.      Prevena dressing is water-resistant and patient may also shower POD #1 with dressing in place, must place battery pack SURGEON with concerns PRIOR TO sending patient to hospital.      Patient's personal belongings (please select all that are sent with patient):  Glasses, Dentures upper and lower    RN SIGNATURE:  Electronically signed by Darin Ch RN on 9/20/19 at 1:58 PM    CASE MANAGEMENT/SOCIAL WORK SECTION    Inpatient Status Date: 9/17/19    Readmission Risk Assessment Score:  Readmission Risk              Risk of Unplanned Readmission:        6           Discharging to Facility/ Agency     [address] [place] [address] [address]                                [address] [place] [address]                                       [phone]  Phone [phone]  Fax  [phone]      [place] health care agency's  to evaluate patient two weeks prior to discharge from home health to determine post-discharge services. TOTAL JOINT REPLACEMENT PATIENT HOME HEALTH CARE PROTOCOL  This patient is a post-operative total joint replacement patient. Expectations for this patients care are as follows:    Initial RN Visit to establish care and verify patient meds plus daily PT for two weeks. Goals:   DailyTherapy for rehabilitative purposes for two weeks.  Increased level of activity and ambulation each day.  Well-controlled pain.  Free from infection.  Encourage patient to provide self-care when possible.  Ambulation with a rolling walker. Activity & Diet:   Therapy performed daily. (Instruct patient to take pain meds. 1 hour prior to therapy.)   Up in chair for all meals and majority of day.  Range of motion for TKA patients.  Hip precautions for JANINE patients.  Incentive Spirometer: 3- 4 inhalations every twenty minutes, during the day, while awake, the first week home after discharge   Increase oral intake of fruits, fiber and water and take a daily stool softener to prevent constipation.    Consider stronger bowel

## 2019-09-17 NOTE — PLAN OF CARE
Problem: Pain:  Goal: Pain level will decrease  Description  Pain level will decrease  Outcome: Ongoing  Note:   Pain decreased with prescribed medication. Problem: Falls - Risk of:  Goal: Will remain free from falls  Description  Will remain free from falls  Outcome: Ongoing  Note:   No falls this shift. Call light within reach and siderails x2. Bed in lowest position. Patient safety maintained. Goal: Absence of physical injury  Description  Absence of physical injury  Outcome: Ongoing  Note:   No falls this shift. Call light within reach and siderails x2. Bed in lowest position. Patient safety maintained.

## 2019-09-17 NOTE — CARE COORDINATION
Faxed demographics/payor information and home health referral to [facility] care. Had [address] and patient would like to go with [facility] instead. Her sister is very happy with their services and patient would like them too.     Electronically signed by Dex Churchill RN on 9/17/2019 at 4:32 PM

## 2019-09-17 NOTE — PROGRESS NOTES
[address]   Occupational Therapy Evaluation  Date: 19  Patient Name: Krunal Kang       Room: 9745/2090-75  MRN: [id number]  Account: [de-identified]   : 1955  (70 y.o.) Gender: female     Discharge Recommendations:  Subacute/Skilled Nursing Facility  Equipment Needed: (TBD)    Referring Practitioner: Dr. Bala Guallpa  Diagnosis: L TKA 19    Treatment Diagnosis: Impaired self-care status  Past Medical History:  has a past medical history of Anemia, Arthritis, Colostomy in place [facility]), Diverticulitis, DJD (degenerative joint disease) of knee, H/O hand fracture, Hypertension, Morbid obesity with BMI of 50.0-59.9, adult ([address] Vitamin D deficiency, Wears glasses, and Wears partial dentures. Past Surgical History:   has a past surgical history that includes  section; Tonsillectomy; colostomy; Colon surgery; Carpal tunnel release (Right, 2014); Carpal tunnel release (Left, 09/10/2014); Gastric bypass surgery; Cholecystectomy; Colonoscopy; and Hand surgery (Left, 2019). Restrictions  Restrictions/Precautions: Fall Risk, Surgical Protocols  Implants present? : Metal implants(L TKA 19)  Required Braces or Orthoses?: No     Vitals  Temp: 97.5 °F (36.4 °C)  Pulse: 83  Resp: 18  BP: 117/63  Height: 5' 6\" (167.6 cm)  Weight: (!) 340 lb (154.2 kg)  BMI (Calculated): 55  Oxygen Therapy  SpO2: 94 %  Pulse Oximeter Device Mode: Intermittent  O2 Device: None (Room air)  O2 Flow Rate (L/min): 0 L/min  Level of Consciousness: Alert    Subjective  Subjective: \"I really want to go somewhere for some therapy\"  Comments: Pt reports no support system at home.   Overall Orientation Status: Within Functional Limits  Vision  Vision: Impaired  Vision Exceptions: Wears glasses at all times  Hearing  Hearing: Within functional limits  Social/Functional History  Lives With: Alone  Type of Home: House(Senior living community)  Home Layout: One level  Home Access: [facility] Tone: [employer]     RUE AROM (degrees)  RUE AROM : Exceptions  RUE General AROM: Active shoulder ROM <50% (premorbid); Other joints WFL     Right Hand AROM (degrees)  Right Hand AROM: WFL    Fine Motor Skills  Coordination  Movements Are Fluid And Coordinated: No  Coordination and Movement description: Gross motor impairments, Right UE, Left UE(Decreased function in B shoulder - RTC injury per pt)     Mobility  Supine to Sit: Stand by assistance  Sit to Supine: Moderate assistance       Balance  Sitting Balance: Supervision  Standing Balance: Unable to assess(comment)(Unsafe to attempt at time of evaluation)     Bed mobility  Supine to Sit: Stand by assistance  Sit to Supine: Moderate assistance  Scooting: Modified independent     Transfers  Transfer Comments: Unsafe to attempt standing/transfers at time of evaluation  Functional Activity Tolerance  Functional Activity Tolerance: Tolerates < 10 Min exercise, no significant change in vital signs  Additional Comments: Pt very lethargic post-op, unable to keep eyes open for any length of time   Assessment  Performance deficits / Impairments: Decreased functional mobility , Decreased ADL status, Decreased ROM, Decreased strength, Decreased endurance, Decreased balance, Decreased high-level IADLs  Treatment Diagnosis: Impaired self-care status  Prognosis: Good  Decision Making: Medium Complexity  REQUIRES OT FOLLOW UP: Yes  Discharge Recommendations: Subacute/Skilled Nursing Facility  Activity Tolerance: Patient limited by fatigue, Patient limited by pain    Goals  Patient Goals   Patient goals : Pt wants to go to SNF at discharge 2* no support system at home. Short term goals  Time Frame for Short term goals: By Discharge  Short term goal 1: Pt will actively participate in [address] - L knee. Short term goal 2: Pt will V/D good understanding of TOTAL JOINT PROGRAM - L knee.   Short term goal 3: Pt will V/D good understanding of AE/DME/modified techniques for

## 2019-09-17 NOTE — ANESTHESIA PROCEDURE NOTES
Peripheral Block    Patient location during procedure: pre-op  Start time: 9/17/2019 10:30 AM  End time: 9/17/2019 10:40 AM  Staffing  Anesthesiologist: Della Eisenberg MD  Performed: anesthesiologist   Preanesthetic Checklist  Completed: patient identified, site marked, surgical consent, pre-op evaluation, timeout performed, IV checked, risks and benefits discussed, monitors and equipment checked, anesthesia consent given, oxygen available and patient being monitored  Peripheral Block  Patient position: supine  Prep: ChloraPrep  Patient monitoring: cardiac monitor, continuous pulse ox, frequent blood pressure checks and IV access  Block type: Femoral  Laterality: left  Injection technique: catheter  Procedures: ultrasound guided  Local infiltration: lidocaine  Infiltration strength: 1 %  Dose: 3 mL  Adductor canal  Provider prep: mask and sterile gloves  Local infiltration: lidocaine  Needle  Needle type: short-bevel   Needle gauge: 20 G  Needle length: 10 cm  Needle localization: ultrasound guidance  Needle insertion depth: 5 cm  Catheter type: open end  Catheter size: 16 G  Catheter at skin depth: 5 cm  Assessment  Injection assessment: negative aspiration for heme, no paresthesia on injection and local visualized surrounding nerve on ultrasound  Paresthesia pain: none  Slow fractionated injection: yes  Hemodynamics: stable  Reason for block: post-op pain management and at surgeon's request

## 2019-09-17 NOTE — FLOWSHEET NOTE
Patient visiting with daughter and son's girlfriend. Patient kept falling asleep during visit, engaged in conversation with Dileep Lindsey patient's daughter. Patient has good family support. Will continue to offer Spiritual care and emotional support. 09/17/19 7638   Encounter Summary   Services provided to: Patient and family together   Referral/Consult From: [address] [place] [address] Children;Family members;Friends/neighbors   Continue Visiting   (9/17/19)   Complexity of Encounter Low   Length of Encounter 15 minutes   Spiritual Assessment Completed Yes   Routine   Type Initial   Assessment Calm; Approachable   Intervention Active listening;Explored feelings, thoughts, concerns;Nurtured hope;Sustaining presence/ Ministry of presence;Prayer;Discussed illness/injury and it's impact   Outcome Expressed gratitude;Engaged in conversation  (Daughter/ Opal)   Visited by Edgard Carrillo

## 2019-09-18 LAB
ANION GAP SERPL CALCULATED.3IONS-SCNC: 11 MMOL/L (ref 9–17)
BUN BLDV-MCNC: 43 MG/DL (ref 8–23)
BUN/CREAT BLD: ABNORMAL (ref 9–20)
CALCIUM SERPL-MCNC: 9.2 MG/DL (ref 8.6–10.4)
CHLORIDE BLD-SCNC: 110 MMOL/L (ref 98–107)
CO2: 17 MMOL/L (ref 20–31)
CREAT SERPL-MCNC: 2.06 MG/DL (ref 0.5–0.9)
GFR AFRICAN AMERICAN: 29 ML/MIN
GFR NON-AFRICAN AMERICAN: 24 ML/MIN
GFR SERPL CREATININE-BSD FRML MDRD: ABNORMAL ML/MIN/{1.73_M2}
GFR SERPL CREATININE-BSD FRML MDRD: ABNORMAL ML/MIN/{1.73_M2}
GLUCOSE BLD-MCNC: 152 MG/DL (ref 70–99)
HCT VFR BLD CALC: 33.9 % (ref 36–46)
HEMOGLOBIN: 10.7 G/DL (ref 12–16)
POTASSIUM SERPL-SCNC: 5.2 MMOL/L (ref 3.7–5.3)
POTASSIUM SERPL-SCNC: 6 MMOL/L (ref 3.7–5.3)
SODIUM BLD-SCNC: 138 MMOL/L (ref 135–144)

## 2019-09-18 PROCEDURE — 6370000000 HC RX 637 (ALT 250 FOR IP): Performed by: FAMILY MEDICINE

## 2019-09-18 PROCEDURE — 85018 HEMOGLOBIN: CPT

## 2019-09-18 PROCEDURE — 1200000000 HC SEMI PRIVATE

## 2019-09-18 PROCEDURE — 99024 POSTOP FOLLOW-UP VISIT: CPT | Performed by: ORTHOPAEDIC SURGERY

## 2019-09-18 PROCEDURE — 97110 THERAPEUTIC EXERCISES: CPT

## 2019-09-18 PROCEDURE — 6370000000 HC RX 637 (ALT 250 FOR IP): Performed by: ORTHOPAEDIC SURGERY

## 2019-09-18 PROCEDURE — 2580000003 HC RX 258: Performed by: ORTHOPAEDIC SURGERY

## 2019-09-18 PROCEDURE — 97530 THERAPEUTIC ACTIVITIES: CPT

## 2019-09-18 PROCEDURE — 80048 BASIC METABOLIC PNL TOTAL CA: CPT

## 2019-09-18 PROCEDURE — 97116 GAIT TRAINING THERAPY: CPT

## 2019-09-18 PROCEDURE — 36415 COLL VENOUS BLD VENIPUNCTURE: CPT

## 2019-09-18 PROCEDURE — 97535 SELF CARE MNGMENT TRAINING: CPT

## 2019-09-18 PROCEDURE — 84132 ASSAY OF SERUM POTASSIUM: CPT

## 2019-09-18 PROCEDURE — 85014 HEMATOCRIT: CPT

## 2019-09-18 PROCEDURE — 6360000002 HC RX W HCPCS: Performed by: ORTHOPAEDIC SURGERY

## 2019-09-18 RX ORDER — HYDROCODONE BITARTRATE AND ACETAMINOPHEN 5; 325 MG/1; MG/1
2 TABLET ORAL EVERY 6 HOURS PRN
Qty: 40 TABLET | Refills: 0 | Status: SHIPPED | OUTPATIENT
Start: 2019-09-18 | End: 2019-09-25

## 2019-09-18 RX ORDER — SODIUM POLYSTYRENE SULFONATE 15 G/60ML
30 SUSPENSION ORAL; RECTAL ONCE
Status: DISCONTINUED | OUTPATIENT
Start: 2019-09-18 | End: 2019-09-18

## 2019-09-18 RX ORDER — BACITRACIN, NEOMYCIN, POLYMYXIN B 400; 3.5; 5 [USP'U]/G; MG/G; [USP'U]/G
OINTMENT TOPICAL 2 TIMES DAILY
Status: DISCONTINUED | OUTPATIENT
Start: 2019-09-18 | End: 2019-09-20 | Stop reason: HOSPADM

## 2019-09-18 RX ORDER — HYDROCODONE BITARTRATE AND ACETAMINOPHEN 5; 325 MG/1; MG/1
2 TABLET ORAL EVERY 6 HOURS PRN
Status: DISCONTINUED | OUTPATIENT
Start: 2019-09-18 | End: 2019-09-20 | Stop reason: HOSPADM

## 2019-09-18 RX ORDER — SODIUM POLYSTYRENE SULFONATE 4.1 MEQ/G
30 POWDER, FOR SUSPENSION ORAL; RECTAL ONCE
Status: COMPLETED | OUTPATIENT
Start: 2019-09-18 | End: 2019-09-18

## 2019-09-18 RX ADMIN — FUROSEMIDE 40 MG: 40 TABLET ORAL at 13:00

## 2019-09-18 RX ADMIN — HYDROCODONE BITARTRATE AND ACETAMINOPHEN 2 TABLET: 5; 325 TABLET ORAL at 22:49

## 2019-09-18 RX ADMIN — SERTRALINE HYDROCHLORIDE 50 MG: 50 TABLET ORAL at 22:45

## 2019-09-18 RX ADMIN — Medication 10 ML: at 09:00

## 2019-09-18 RX ADMIN — HYDROCODONE BITARTRATE AND ACETAMINOPHEN 2 TABLET: 5; 325 TABLET ORAL at 13:01

## 2019-09-18 RX ADMIN — Medication 10 ML: at 22:41

## 2019-09-18 RX ADMIN — RIVAROXABAN 10 MG: 10 TABLET, FILM COATED ORAL at 18:45

## 2019-09-18 RX ADMIN — SPIRONOLACTONE 50 MG: 25 TABLET, FILM COATED ORAL at 13:00

## 2019-09-18 RX ADMIN — POLYMYXIN B SULFATE, BACITRACIN ZINC, NEOMYCIN SULFATE: 5000; 3.5; 4 OINTMENT TOPICAL at 22:40

## 2019-09-18 RX ADMIN — DOCUSATE SODIUM 100 MG: 100 CAPSULE, LIQUID FILLED ORAL at 22:39

## 2019-09-18 RX ADMIN — OMEPRAZOLE 20 MG: 20 CAPSULE, DELAYED RELEASE ORAL at 05:24

## 2019-09-18 RX ADMIN — KETOROLAC TROMETHAMINE 15 MG: 30 INJECTION, SOLUTION INTRAMUSCULAR at 05:23

## 2019-09-18 RX ADMIN — ACETAMINOPHEN 1000 MG: 500 TABLET, FILM COATED ORAL at 05:23

## 2019-09-18 RX ADMIN — ONDANSETRON 4 MG: 2 INJECTION INTRAMUSCULAR; INTRAVENOUS at 00:22

## 2019-09-18 RX ADMIN — ANTACID TABLETS 500 MG: 500 TABLET, CHEWABLE ORAL at 22:39

## 2019-09-18 RX ADMIN — ONDANSETRON 4 MG: 2 INJECTION INTRAMUSCULAR; INTRAVENOUS at 08:37

## 2019-09-18 RX ADMIN — DOCUSATE SODIUM 100 MG: 100 CAPSULE, LIQUID FILLED ORAL at 13:01

## 2019-09-18 RX ADMIN — SODIUM POLYSTYRENE SULFONATE 30 G: 1 POWDER ORAL; RECTAL at 12:24

## 2019-09-18 RX ADMIN — FERROUS SULFATE TAB 325 MG (65 MG ELEMENTAL FE) 325 MG: 325 (65 FE) TAB at 13:00

## 2019-09-18 RX ADMIN — ANTACID TABLETS 500 MG: 500 TABLET, CHEWABLE ORAL at 13:00

## 2019-09-18 ASSESSMENT — PAIN SCALES - GENERAL
PAINLEVEL_OUTOF10: 6
PAINLEVEL_OUTOF10: 7
PAINLEVEL_OUTOF10: 7
PAINLEVEL_OUTOF10: 6
PAINLEVEL_OUTOF10: 7
PAINLEVEL_OUTOF10: 3
PAINLEVEL_OUTOF10: 5

## 2019-09-18 ASSESSMENT — PAIN DESCRIPTION - ORIENTATION
ORIENTATION: LEFT

## 2019-09-18 ASSESSMENT — PAIN DESCRIPTION - PAIN TYPE
TYPE: SURGICAL PAIN
TYPE: ACUTE PAIN;SURGICAL PAIN
TYPE: SURGICAL PAIN
TYPE: SURGICAL PAIN

## 2019-09-18 ASSESSMENT — PAIN DESCRIPTION - PROGRESSION: CLINICAL_PROGRESSION: NOT CHANGED

## 2019-09-18 ASSESSMENT — PAIN DESCRIPTION - LOCATION
LOCATION: KNEE

## 2019-09-18 NOTE — PROGRESS NOTES
Objective   Bed mobility  Scooting: (P) Stand by assistance  Transfers  Sit to Stand: (P) Moderate Assistance  Stand to sit: (P) Minimal Assistance  Stand Pivot Transfers: (P) Contact guard assistance  Ambulation  Ambulation?: (P) Yes  Ambulation 1  Surface: (P) level tile  Device: (P) Rolling Walker  Assistance: (P) Contact guard assistance  Quality of Gait: (P) poor posture, demos step-to gait  Gait Deviations: (P) Decreased step length; Slow Lissette  Distance: (P) 12' x2     Balance  Sitting - Static: (P) Good  Other exercises  Other exercises?: (P) Yes  Other exercises 1: (P) supine ex. 10x, 5 sec hold; quad sets, glute sets, short-arc quad, SLR, hamstring activation, heel slides, ankle pumps  Other exercises 2: (P) seated ex. Left L/E Heel slides 10x                       G-Code     OutComes Score                                                     AM-PAC Score             Goals  Short term goals  Time Frame for Short term goals: 4 -5 sessions  Short term goal 1: bed mobility with min/SBA  Short term goal 2: transfers with CGA  Short term goal 3: gait with RW/CGA x 50ft  Patient Goals   Patient goals : go to [address] [place] [address]  Times per week: (P) BID  Times per day: (P) Twice a day  Current Treatment Recommendations: Strengthening, ROM, Balance Training, Functional Mobility Training, Transfer Training, Gait Training, Safety Education & Training  Safety Devices  Type of devices: (P) Call light within reach, Left in bed, Patient at risk for falls     Therapy Time   Individual Concurrent Group Co-treatment   Time In 1036/1426         Time Out 1117/1501         Minutes 68                 [facility] [place] //  The above documentation completed by Student Physical Therapist Assistant was reviewed and accepted by the supervising Clinical instructor STACIA Johnson.

## 2019-09-18 NOTE — PROGRESS NOTES
Nutrition Assessment (Low Risk)    Type and Reason for Visit: Positive Nutrition Screen(Unplanned weight loss, decreased appetite)    Nutrition Recommendations: Continue General diet. Nutrition Assessment:  Patient assessed for nutritional risk. Deemed to be at low risk at this time. Will continue to monitor for changes in status. Patient is adequately nourished on admit as evidence by good p.o intakes. Weight loss noted in electronic record but is most likely related to anasarca in May 2019 (weight is down 27 lbs). Patient is at risk for compromise due to increased nutrient needs for healing status post left total knee arthroplasty (9/17/19). Continue General diet.       Malnutrition Assessment:  · Malnutrition Status: No malnutrition    Nutrition Risk Level   Risk Level: Low    Nutrition Diagnosis:   · Problem: Increased nutrient needs  · Etiology: Increased demand for energy/nutrients    Signs and symptoms: Presence of wounds(status post left total knee arthroplasty)    Nutrition Intervention:  Food and/or Delivery: Continue current diet  Nutrition Education/Counseling/Coordination of Care:  Continued Inpatient Monitoring      Suma Omer  [facility], R.TIMI, L.D,  Clinical Dietitian  Cell # [phone]  Office # [phone]

## 2019-09-18 NOTE — PROGRESS NOTES
[address] [facility]    INPATIENT OCCUPATIONAL THERAPY  PROGRESS NOTE  Date: 2019  Patient Name: Rut Reyna      Room: 9569/6981-80  MRN: [id number]    : 1955  (70 y.o.) Gender: female     Discharge Recommendations:  Subacute/Skilled Nursing Facility  Equipment Needed: (TBD)    Referring Practitioner: Dr. Ted Medrano  Diagnosis: L TKA 19  General  Chart Reviewed: Yes  Patient assessed for rehabilitation services?: Yes  Family / Caregiver Present: No  Referring Practitioner: Dr. Ted Medrano  Diagnosis: L TKA 19    Restrictions  Restrictions/Precautions: Fall Risk, Surgical Protocols  Implants present? : Metal implants(L TKA 19)  Required Braces or Orthoses?: No      Subjective  Subjective: \"I'm feeling much better this afternoon but pretty drowsy\"  Comments: pt drowsy but agreeable to tx  Patient Currently in Pain: Yes  Pain Level: 7  Pain Location: Knee  Pain Orientation: Left  Overall Orientation Status: Within Functional Limits  Patient Observation  Observations: (med for pain given)  Pain Assessment  Pain Assessment: 0-10  Pain Level: 7  Pain Type: Acute pain, Surgical pain  Pain Location: Knee  Pain Orientation: Left  Clinical Progression: Not changed    Objective  Cognition  Overall Cognitive Status: WFL  Bed mobility  Rolling to Left: Stand by assistance  Rolling to Right: Stand by assistance  Supine to Sit: Stand by assistance(head of bed slightly elevated and use of rail)  Scooting: Stand by assistance  Balance  Sitting Balance: Supervision  Standing Balance: Contact guard assistance  Standing Balance  Time: 3-4 minutes, 2-3 min c RW  Activity: Hallway mobility, bed>toilet transfer  Comment: Pt demo slow pace, unsteady at times but no loss of balance noted  Functional Mobility  Functional - Mobility Device: Rolling Walker  Activity: Other; To/from bathroom(Hallway mobility)  Assist Level: Contact guard assistance  Functional Mobility Comments: Pt demo slow pace, unsteady at

## 2019-09-19 PROBLEM — E87.5 HYPERKALEMIA: Status: ACTIVE | Noted: 2019-09-19

## 2019-09-19 PROBLEM — K29.00 ACUTE GASTRITIS WITHOUT HEMORRHAGE: Status: ACTIVE | Noted: 2019-09-19

## 2019-09-19 LAB
ANION GAP SERPL CALCULATED.3IONS-SCNC: 16 MMOL/L (ref 9–17)
BUN BLDV-MCNC: 57 MG/DL (ref 8–23)
BUN/CREAT BLD: ABNORMAL (ref 9–20)
CALCIUM SERPL-MCNC: 8.7 MG/DL (ref 8.6–10.4)
CHLORIDE BLD-SCNC: 105 MMOL/L (ref 98–107)
CO2: 15 MMOL/L (ref 20–31)
CREAT SERPL-MCNC: 3.6 MG/DL (ref 0.5–0.9)
GFR AFRICAN AMERICAN: 15 ML/MIN
GFR NON-AFRICAN AMERICAN: 13 ML/MIN
GFR SERPL CREATININE-BSD FRML MDRD: ABNORMAL ML/MIN/{1.73_M2}
GFR SERPL CREATININE-BSD FRML MDRD: ABNORMAL ML/MIN/{1.73_M2}
GLUCOSE BLD-MCNC: 114 MG/DL (ref 70–99)
POTASSIUM SERPL-SCNC: 5.1 MMOL/L (ref 3.7–5.3)
SODIUM BLD-SCNC: 136 MMOL/L (ref 135–144)

## 2019-09-19 PROCEDURE — 1200000000 HC SEMI PRIVATE

## 2019-09-19 PROCEDURE — 2580000003 HC RX 258: Performed by: ORTHOPAEDIC SURGERY

## 2019-09-19 PROCEDURE — 97530 THERAPEUTIC ACTIVITIES: CPT

## 2019-09-19 PROCEDURE — 6370000000 HC RX 637 (ALT 250 FOR IP): Performed by: ORTHOPAEDIC SURGERY

## 2019-09-19 PROCEDURE — 6370000000 HC RX 637 (ALT 250 FOR IP): Performed by: FAMILY MEDICINE

## 2019-09-19 PROCEDURE — 97535 SELF CARE MNGMENT TRAINING: CPT

## 2019-09-19 PROCEDURE — 80048 BASIC METABOLIC PNL TOTAL CA: CPT

## 2019-09-19 PROCEDURE — 97110 THERAPEUTIC EXERCISES: CPT

## 2019-09-19 PROCEDURE — 97116 GAIT TRAINING THERAPY: CPT

## 2019-09-19 PROCEDURE — 36415 COLL VENOUS BLD VENIPUNCTURE: CPT

## 2019-09-19 RX ORDER — PANTOPRAZOLE SODIUM 40 MG/1
40 TABLET, DELAYED RELEASE ORAL
Status: DISCONTINUED | OUTPATIENT
Start: 2019-09-19 | End: 2019-09-20 | Stop reason: HOSPADM

## 2019-09-19 RX ORDER — SPIRONOLACTONE 25 MG/1
25 TABLET ORAL DAILY
Status: DISCONTINUED | OUTPATIENT
Start: 2019-09-20 | End: 2019-09-20 | Stop reason: HOSPADM

## 2019-09-19 RX ADMIN — HYDROCODONE BITARTRATE AND ACETAMINOPHEN 2 TABLET: 5; 325 TABLET ORAL at 07:04

## 2019-09-19 RX ADMIN — SPIRONOLACTONE 50 MG: 25 TABLET, FILM COATED ORAL at 08:51

## 2019-09-19 RX ADMIN — Medication 10 ML: at 21:14

## 2019-09-19 RX ADMIN — RIVAROXABAN 10 MG: 10 TABLET, FILM COATED ORAL at 18:32

## 2019-09-19 RX ADMIN — Medication 10 ML: at 11:10

## 2019-09-19 RX ADMIN — PANTOPRAZOLE SODIUM 40 MG: 40 TABLET, DELAYED RELEASE ORAL at 11:09

## 2019-09-19 RX ADMIN — SERTRALINE HYDROCHLORIDE 50 MG: 50 TABLET ORAL at 21:13

## 2019-09-19 RX ADMIN — FUROSEMIDE 40 MG: 40 TABLET ORAL at 08:51

## 2019-09-19 RX ADMIN — FERROUS SULFATE TAB 325 MG (65 MG ELEMENTAL FE) 325 MG: 325 (65 FE) TAB at 08:51

## 2019-09-19 RX ADMIN — POLYMYXIN B SULFATE, BACITRACIN ZINC, NEOMYCIN SULFATE: 5000; 3.5; 4 OINTMENT TOPICAL at 21:15

## 2019-09-19 RX ADMIN — OMEPRAZOLE 20 MG: 20 CAPSULE, DELAYED RELEASE ORAL at 07:11

## 2019-09-19 RX ADMIN — ANTACID TABLETS 500 MG: 500 TABLET, CHEWABLE ORAL at 21:13

## 2019-09-19 RX ADMIN — ANTACID TABLETS 500 MG: 500 TABLET, CHEWABLE ORAL at 08:51

## 2019-09-19 RX ADMIN — ANTACID TABLETS 500 MG: 500 TABLET, CHEWABLE ORAL at 15:47

## 2019-09-19 RX ADMIN — POLYMYXIN B SULFATE, BACITRACIN ZINC, NEOMYCIN SULFATE: 5000; 3.5; 4 OINTMENT TOPICAL at 11:09

## 2019-09-19 RX ADMIN — HYDROCODONE BITARTRATE AND ACETAMINOPHEN 2 TABLET: 5; 325 TABLET ORAL at 15:55

## 2019-09-19 RX ADMIN — DOCUSATE SODIUM 100 MG: 100 CAPSULE, LIQUID FILLED ORAL at 21:13

## 2019-09-19 RX ADMIN — DOCUSATE SODIUM 100 MG: 100 CAPSULE, LIQUID FILLED ORAL at 08:51

## 2019-09-19 ASSESSMENT — PAIN SCALES - GENERAL
PAINLEVEL_OUTOF10: 8
PAINLEVEL_OUTOF10: 7
PAINLEVEL_OUTOF10: 8
PAINLEVEL_OUTOF10: 8

## 2019-09-19 ASSESSMENT — PAIN DESCRIPTION - DESCRIPTORS: DESCRIPTORS: ACHING

## 2019-09-19 ASSESSMENT — PAIN DESCRIPTION - LOCATION
LOCATION: KNEE
LOCATION: KNEE

## 2019-09-19 ASSESSMENT — PAIN DESCRIPTION - ORIENTATION
ORIENTATION: LEFT
ORIENTATION: LEFT

## 2019-09-19 ASSESSMENT — PAIN DESCRIPTION - PROGRESSION
CLINICAL_PROGRESSION: NOT CHANGED
CLINICAL_PROGRESSION: GRADUALLY WORSENING

## 2019-09-19 ASSESSMENT — PAIN DESCRIPTION - PAIN TYPE
TYPE: ACUTE PAIN;SURGICAL PAIN
TYPE: ACUTE PAIN

## 2019-09-19 NOTE — PROGRESS NOTES
Pola'nory vm from Colleen Watkins, [address], stating pt has no support upon d/c. Notified Colleen aWtkins that Dr Brian Juares is in agreement with PT/OT recommendation for SNF at this time as pt has no assistance upon d/c.

## 2019-09-19 NOTE — CONSULTS
The [address] [place] [address] H&P     CHIEF COMPLAINT:     Reason for Admission:  L TKA    History Obtained From:  Patient     Physician Requesting Consult:  Dr Eddie Flowers    Reason for consult: Medical Management    HISTORY OF PRESENT ILLNESS:      The patient is a  70 y.o. female with significant medical history of Morbid obesity,HTN,COPD,Lymphedema BLE,CKD3 who is s/p L TKA. Pt has had nausea/emesis w/o any Abd pain, No relief with Trans scop and IV Zofran, her K is 6 this am. Pt thinks her nausea could be from Oxycodone because she is intolerant to Oxycontin. Pt has minimal L knee pain. Pt has no CP or SOB. Past Medical History:        Diagnosis Date    Anemia     on iron    Arthritis     Colostomy in place [facility])     Diverticulitis     Hx, had a rupture one that ended up in a colostomy    DJD (degenerative joint disease) of knee     H/O hand fracture     right hand was casted, no surgery    Hypertension     Morbid obesity with BMI of 50.0-59.9, adult ([address]     Vitamin D deficiency     Wears glasses     Wears partial dentures        Past Surgical History:        Procedure Laterality Date    CARPAL TUNNEL RELEASE Right 2014    CARPAL TUNNEL RELEASE Left 09/10/2014     SECTION      X2    CHOLECYSTECTOMY      COLON SURGERY      ATTEMPTED TO REVERSE COLOSTOMY UNSUCCESSFUL    COLONOSCOPY      COLOSTOMY      for ruptured diverticuli    GASTRIC BYPASS SURGERY      HAND SURGERY Left 2019    to shave down bone growth    JOINT REPLACEMENT      TONSILLECTOMY      TOTAL KNEE ARTHROPLASTY Left 2019    KNEE TOTAL ARTHROPLASTY performed by Saeid Serra MD at [address]       Medications Prior to Admission:    Medications Prior to Admission: Ciprofloxacin (CIPRO PO), Take 1 tablet by mouth daily  mupirocin (BACTROBAN) 2 % ointment, Apply 1 each topically 3 times daily Indications: to bilateral nares Apply topically 3 times daily.   sertraline (ZOLOFT) 50 MG tablet, Take 50 mg by and time with normal affect  Head - normocephalic and atraumatic  Eyes - pupils equal and reactive, extraocular eye movements intact, conjunctiva clear  Ears - hearing appears to be intact  Nose - no drainage noted  Mouth - mucous membranes moist  Neck - supple, no carotid bruits, thyroid not palpable  Chest - clear to auscultation, normal effort  Heart - normal rate, regular rhythm, no murmur  Abdomen - soft, nontender, nondistended, bowel sounds present all four quadrants, no masses, hepatomegaly or splenomegaly  Neurological - normal speech, no focal findings or movement disorder noted, cranial nerves II through XII grossly intact  Extremities - peripheral pulses palpable, no pedal edema or calf pain with palpation  Skin - no gross lesions, rashes, or induration noted, L knee incision clean. DATA:  Hematology:  Recent Labs     09/18/19  0804   HGB 10.7*   HCT 33.9*     Chemistry:  Recent Labs     09/17/19  1604 09/18/19  0804 09/18/19  1928   NA  --  138  --    K  --  6.0* 5.2   CL  --  110*  --    CO2  --  17*  --    GLUCOSE  --  152*  --    BUN  --  43*  --    CREATININE 1.60* 2.06*  --    ANIONGAP  --  11  --    LABGLOM 32* 24*  --    GFRAA 39* 29*  --    CALCIUM  --  9.2  --      No results for input(s): PROT, LABALBU, LABA1C, J7VUCGH, I7NGNLW, FT4, TSH, AST, ALT, LDH, GGT, ALKPHOS, BILITOT, BILIDIR, AMMONIA, AMYLASE, LIPASE, LACTATE, CHOL, HDL, LDLCHOLESTEROL, CHOLHDLRATIO, TRIG, VLDL, PHENYTOIN, PHENYF in the last 72 hours. ASSESSMENT:  · S/P L TKA  · Nausea/Emesis  · Hyperkalemia  · CKD 3  · M Obesity  · Ch BLE Lymphedema  · COPD  · H/O Gastric bypass  · Colostomy  · HTN    PLAN:  · Change Oxycodone to Hydrocodone, Kayexelate fo high K and repeat K in 6 hrs. Thank you for this consult.   Electronically signed by Lashanda Quevedo MD on 9/18/2019 at 9:22 PM

## 2019-09-19 NOTE — PROGRESS NOTES
calories ([facility] [address] 05/01/2017    Primary cough headache 04/01/2017    Asthmatic bronchitis with acute exacerbation 03/31/2017    Primary osteoarthritis of both knees 03/31/2017         Hyperkalemia        Gastritis     Plan:   1. Repeat BMP  2. Protonix 40 PO daily starting today;  3. PRN TUMS  4.  Reduce spironolactone 25 QD    Electronically signed by Lynda Martini MD on 9/19/2019 at 9:37 AM

## 2019-09-19 NOTE — PROGRESS NOTES
Physical Therapy  Facility/Department: [facility] MED SURG  Daily Treatment Note  NAME: Ria Glover  : 1955  MRN: [id number]    Date of Service: 2019    Discharge Recommendations:  (P) Subacute/Skilled Nursing Facility        Assessment   Body structures, Functions, Activity limitations: (P) Decreased functional mobility ; Decreased strength;Decreased ROM; Decreased balance  Assessment: (P) Impaired mobility due to L TKA  Decision Making: (P) Medium Complexity  History: (P) Obesity  Exam: (P) decreased ROM, strength, balance, gait  Clinical Presentation: (P) evolving  REQUIRES PT FOLLOW UP: (P) Yes  Activity Tolerance  Activity Tolerance: (P) Patient Tolerated treatment well;Patient limited by fatigue;Patient limited by pain     Patient Diagnosis(es): The encounter diagnosis was Primary osteoarthritis of both knees. has a past medical history of Anemia, Arthritis, Colostomy in place [facility]), Diverticulitis, DJD (degenerative joint disease) of knee, H/O hand fracture, Hypertension, Morbid obesity with BMI of 50.0-59.9, adult ([facility] [address] Vitamin D deficiency, Wears glasses, and Wears partial dentures. has a past surgical history that includes  section; Tonsillectomy; colostomy; Colon surgery; Carpal tunnel release (Right, 2014); Carpal tunnel release (Left, 09/10/2014); Gastric bypass surgery; Cholecystectomy; Colonoscopy; Hand surgery (Left, 2019); joint replacement; and Total knee arthroplasty (Left, 2019). Restrictions  Restrictions/Precautions  Restrictions/Precautions: (P) Fall Risk, Surgical Protocols  Required Braces or Orthoses?: (P) No  Implants present? : (P) Metal implants  Subjective   General  Response To Previous Treatment: (P) Patient with no complaints from previous session. Subjective  Subjective: (P) Pt report increased pain upon entry to both L/E. Pt stated that she slept w/ surgical hose and was very uncomfortable.   General Comment: Pt has increased swelling and -5 sessions  Short term goal 1: (P) bed mobility with min/SBA  Short term goal 2: (P) transfers with CGA  Short term goal 3: (P) gait with RW/CGA x 50ft  Patient Goals   Patient goals : go to [address] [place] [address] per week: (P) BID  Times per day: (P) Twice a day  Current Treatment Recommendations: (P) Strengthening, ROM, Balance Training, Functional Mobility Training, Transfer Training, Gait Training, Safety Education & Training  Safety Devices  Type of devices: (P) Call light within reach, Gait belt, Left in chair, Nurse notified(Notified JOAQUIN Stiles to apply ice and wrap legs)     Therapy Time   Individual Concurrent Group Co-treatment   Time In 1135/1318         Time Out 1205/1351         Minutes [address] [place] [address], [facility]  Electronically signed by Elidia Mason PTA on 9/19/2019 at 4:55 PM

## 2019-09-19 NOTE — CARE COORDINATION
Social Work-[facility] Zahida krishnan Shae will not have bed tomorrow. They will have bed at [facility] and would be willing to transfer pt to [facility]. Discussed with pt. She is agreeable to [facility].  [facility] will admit Fri after 4. Raphael Mehta

## 2019-09-19 NOTE — CARE COORDINATION
DISCHARGE PLANNING NOTE:    LSW continues to follow for discharge to SNF- [facility]. POD#2 Left TKA. [facility] to follow for d/c from SNF. Will continue to follow for additional d/c needs.     Electronically signed by Kelvin Cohen RN on 9/19/2019 at 9:58 AM

## 2019-09-20 VITALS
HEIGHT: 66 IN | BODY MASS INDEX: 47.09 KG/M2 | SYSTOLIC BLOOD PRESSURE: 109 MMHG | RESPIRATION RATE: 16 BRPM | DIASTOLIC BLOOD PRESSURE: 52 MMHG | WEIGHT: 293 LBS | HEART RATE: 64 BPM | OXYGEN SATURATION: 96 % | TEMPERATURE: 97.6 F

## 2019-09-20 PROCEDURE — 2580000003 HC RX 258: Performed by: ORTHOPAEDIC SURGERY

## 2019-09-20 PROCEDURE — 6370000000 HC RX 637 (ALT 250 FOR IP): Performed by: FAMILY MEDICINE

## 2019-09-20 PROCEDURE — 97530 THERAPEUTIC ACTIVITIES: CPT

## 2019-09-20 PROCEDURE — 6360000002 HC RX W HCPCS: Performed by: ORTHOPAEDIC SURGERY

## 2019-09-20 PROCEDURE — G0008 ADMIN INFLUENZA VIRUS VAC: HCPCS | Performed by: ORTHOPAEDIC SURGERY

## 2019-09-20 PROCEDURE — 97110 THERAPEUTIC EXERCISES: CPT

## 2019-09-20 PROCEDURE — 6370000000 HC RX 637 (ALT 250 FOR IP): Performed by: ORTHOPAEDIC SURGERY

## 2019-09-20 PROCEDURE — 97116 GAIT TRAINING THERAPY: CPT

## 2019-09-20 PROCEDURE — 97535 SELF CARE MNGMENT TRAINING: CPT

## 2019-09-20 PROCEDURE — 99024 POSTOP FOLLOW-UP VISIT: CPT | Performed by: ORTHOPAEDIC SURGERY

## 2019-09-20 PROCEDURE — 90686 IIV4 VACC NO PRSV 0.5 ML IM: CPT | Performed by: ORTHOPAEDIC SURGERY

## 2019-09-20 RX ADMIN — FERROUS SULFATE TAB 325 MG (65 MG ELEMENTAL FE) 325 MG: 325 (65 FE) TAB at 08:25

## 2019-09-20 RX ADMIN — ANTACID TABLETS 500 MG: 500 TABLET, CHEWABLE ORAL at 08:24

## 2019-09-20 RX ADMIN — SPIRONOLACTONE 25 MG: 25 TABLET, FILM COATED ORAL at 08:25

## 2019-09-20 RX ADMIN — HYDROCODONE BITARTRATE AND ACETAMINOPHEN 2 TABLET: 5; 325 TABLET ORAL at 08:24

## 2019-09-20 RX ADMIN — ANTACID TABLETS 500 MG: 500 TABLET, CHEWABLE ORAL at 13:22

## 2019-09-20 RX ADMIN — POLYMYXIN B SULFATE, BACITRACIN ZINC, NEOMYCIN SULFATE: 5000; 3.5; 4 OINTMENT TOPICAL at 08:27

## 2019-09-20 RX ADMIN — Medication 10 ML: at 08:28

## 2019-09-20 RX ADMIN — FUROSEMIDE 40 MG: 40 TABLET ORAL at 08:25

## 2019-09-20 RX ADMIN — DOCUSATE SODIUM 100 MG: 100 CAPSULE, LIQUID FILLED ORAL at 08:25

## 2019-09-20 RX ADMIN — PANTOPRAZOLE SODIUM 40 MG: 40 TABLET, DELAYED RELEASE ORAL at 05:43

## 2019-09-20 RX ADMIN — INFLUENZA A VIRUS A/BRISBANE/02/2018 IVR-190 (H1N1) ANTIGEN (PROPIOLACTONE INACTIVATED), INFLUENZA A VIRUS A/KANSAS/14/2017 X-327 (H3N2) ANTIGEN (PROPIOLACTONE INACTIVATED), INFLUENZA B VIRUS B/MARYLAND/15/2016 ANTIGEN (PROPIOLACTONE INACTIVATED), INFLUENZA B VIRUS B/PHUKET/3073/2013 BVR-1B ANTIGEN (PROPIOLACTONE INACTIVATED) 0.5 ML: 15; 15; 15; 15 INJECTION, SUSPENSION INTRAMUSCULAR at 13:22

## 2019-09-20 ASSESSMENT — PAIN DESCRIPTION - PAIN TYPE
TYPE: SURGICAL PAIN
TYPE: ACUTE PAIN;SURGICAL PAIN

## 2019-09-20 ASSESSMENT — PAIN DESCRIPTION - DESCRIPTORS: DESCRIPTORS: ACHING

## 2019-09-20 ASSESSMENT — PAIN DESCRIPTION - ORIENTATION
ORIENTATION: LEFT
ORIENTATION: LEFT

## 2019-09-20 ASSESSMENT — PAIN SCALES - GENERAL
PAINLEVEL_OUTOF10: 8
PAINLEVEL_OUTOF10: 4
PAINLEVEL_OUTOF10: 5
PAINLEVEL_OUTOF10: 10

## 2019-09-20 ASSESSMENT — PAIN DESCRIPTION - LOCATION
LOCATION: KNEE
LOCATION: KNEE

## 2019-09-20 NOTE — PROGRESS NOTES
Assessment: 0-10  Pain Level: 5  Patient's Stated Pain Goal: No pain  Pain Type: Surgical pain  Pain Location: Knee  Pain Orientation: Left  Response to Pain Intervention: Patient Satisfied       Orientation  Orientation  Overall Orientation Status: Within Functional Limits  Cognition      Objective      Transfers  Sit to Stand: Moderate Assistance;2 Person Assistance(t Pt has dfficult time getting up d/t chair and pt's size. )  Stand to sit: Contact guard assistance  Bed to Chair: Contact guard assistance  Stand Pivot Transfers: Contact guard assistance  Ambulation  Ambulation?: Yes  More Ambulation?: No  Ambulation 1  Surface: level tile  Device: Rolling Walker  Assistance: Contact guard assistance  Quality of Gait: poor posture, wide PATRICIO  Gait Deviations: Slow Lissette; Increased PATRICIO  Distance: 50' x 2  Comments: Pt easily fatigued, required frequent standing rest break. Ambulation 2  Surface - 2: level tile  Device 2: Rolling Walker  Assistance 2: Contact guard assistance  Quality of Gait 2: improved posture, wide PATRICIO  Gait Deviations: Increased PATRICIO; Slow Lissette  Distance: [de-identified]' x2  Comments: pt beginning to demo a \"step-through\" gait  Stairs/Curb  Stairs?: No     Balance  Posture: Poor  Sitting - Static: Good;-  Sitting - Dynamic: Fair;+  Standing - Static: Fair;+  Standing - Dynamic: Fair  Comments: constant VC's to stand straight  Other exercises  Other exercises?: Yes  Other exercises 1: Sit >Stand x 2(v.c for hand placement.)  Other exercises 2: TKR Protocol seated x 10  Other exercises 3: Skate x 15 prn         Other Activities: Other (see comment)(Blayne stretches 82* flexion seated/-1* ext supine.)              G-Code     OutComes Score                                                     AM-PAC Score             Goals  Short term goals  Time Frame for Short term goals: 4 -5 sessions  Short term goal 1: bed mobility with min/SBA  Short term goal 2: transfers with CGA  Short term goal 3: gait with RW/CGA x

## 2019-09-20 NOTE — PLAN OF CARE
Problem: Pain:  Goal: Pain level will decrease  Description  Pain level will decrease  9/20/2019 0311 by Felisha Bobo RN  Outcome: Ongoing   Pain assessed and charted as documented. Problem: Falls - Risk of:  Goal: Will remain free from falls  Description  Will remain free from falls  9/20/2019 0311 by Felisha Bobo RN  Outcome: Ongoing   Pt. Free of falls and injuries this shift. Problem: Falls - Risk of:  Goal: Absence of physical injury  Description  Absence of physical injury  9/20/2019 0311 by Felisha Bobo RN  Outcome: Ongoing   No injuries this shift. Patient's safety maintained. Problem: Infection - Surgical Site:  Goal: Will show no infection signs and symptoms  Description  Will show no infection signs and symptoms  9/20/2019 0311 by Felisha Bobo RN  Outcome: Ongoing   MRSA; Bacitracin applied to patient's nares.

## 2019-09-20 NOTE — PROGRESS NOTES
[address] [facility]    INPATIENT OCCUPATIONAL THERAPY  PROGRESS NOTE  Date: 2019  Patient Name: Krunal Kang      Room: 3995/1636-55  MRN: [id number]    : 1955  (70 y.o.) Gender: female     Discharge Recommendations:  Subacute/Skilled Nursing Facility  Equipment Needed: Yes    Referring Practitioner: Dr. Bala Guallpa  Diagnosis: L TKA 19  General  Chart Reviewed: Yes  Patient assessed for rehabilitation services?: Yes  Family / Caregiver Present: No  Referring Practitioner: Dr. Bala Guallpa  Diagnosis: L TKA 19    Restrictions  Restrictions/Precautions: Fall Risk, Surgical Protocols  Implants present? : Metal implants  Required Braces or Orthoses?: No      Subjective  Subjective: pt states that she is ready to get moving for the day. Comments: pt alert and motivated  Patient Currently in Pain: Yes  Pain Level: 8  Pain Location: Knee  Pain Orientation: Left  Overall Orientation Status: Within Functional Limits  Patient Observation  Observations: (med for pain given prior to writer arriving)  Pain Assessment  Pain Assessment: 0-10  Pain Level: 8  Pain Type: Acute pain, Surgical pain  Pain Location: Knee  Pain Orientation: Left  Pain Descriptors: Aching  Clinical Progression: Not changed  Response to Pain Intervention: Patient Satisfied    Objective     Bed mobility  Comment: pt sitting up in chair  Balance  Sitting Balance: Supervision  Standing Balance: Stand by assistance  Standing Balance  Time: 3-4 minutes, 2-3 min x 2 c RW  Activity: Hallway mobility, chair<>toilet transfer  Comment: Pt demo slow pace, unsteady at times but no loss of balance noted  Functional Mobility  Functional - Mobility Device: Rolling Walker  Activity: Other; To/from bathroom(Hallway mobility)  Assist Level: Stand by assistance  Functional Mobility Comments: Pt demo slow pace, unsteady at times but no loss of balance noted   ADL  Toileting: Stand by assistance(pt completes pericare and colostomy mgt sitting on

## 2019-09-23 ENCOUNTER — CARE COORDINATION (OUTPATIENT)
Dept: CASE MANAGEMENT | Age: 64
End: 2019-09-23

## 2019-09-23 NOTE — CARE COORDINATION
[facility] Update Call    2019    Patient: Dennis Guzmán Patient : 1955   MRN: [id number]  Reason for Admission:  [facility]  Discharge Date: 19 RARS: Readmission Risk Score: 15         Care Transitions Post Acute Facility Update    Care Transitions Interventions  Post Acute Facility:  [address] Update       Secure e-mail sent to staff @ SNF. Notified that this patient has elected the  [facility] program.  Request discharge planning, clinical and functional progress reports. Contact information provided.

## 2019-09-25 ENCOUNTER — CARE COORDINATION (OUTPATIENT)
Dept: CASE MANAGEMENT | Age: 64
End: 2019-09-25

## 2019-10-01 DIAGNOSIS — Z96.652 STATUS POST TOTAL LEFT KNEE REPLACEMENT: Primary | ICD-10-CM

## 2019-10-02 ENCOUNTER — CARE COORDINATION (OUTPATIENT)
Dept: CASE MANAGEMENT | Age: 64
End: 2019-10-02

## 2019-10-02 ENCOUNTER — OFFICE VISIT (OUTPATIENT)
Dept: ORTHOPEDIC SURGERY | Age: 64
End: 2019-10-02

## 2019-10-02 DIAGNOSIS — M25.562 CHRONIC PAIN OF BOTH KNEES: ICD-10-CM

## 2019-10-02 DIAGNOSIS — M17.0 PRIMARY OSTEOARTHRITIS OF BOTH KNEES: ICD-10-CM

## 2019-10-02 DIAGNOSIS — M25.562 CHRONIC PAIN OF LEFT KNEE: Primary | ICD-10-CM

## 2019-10-02 DIAGNOSIS — G89.29 CHRONIC PAIN OF BOTH KNEES: ICD-10-CM

## 2019-10-02 DIAGNOSIS — M25.561 CHRONIC PAIN OF BOTH KNEES: ICD-10-CM

## 2019-10-02 DIAGNOSIS — G89.29 CHRONIC PAIN OF LEFT KNEE: Primary | ICD-10-CM

## 2019-10-02 PROCEDURE — 99024 POSTOP FOLLOW-UP VISIT: CPT | Performed by: ORTHOPAEDIC SURGERY

## 2019-10-02 RX ORDER — TRAMADOL HYDROCHLORIDE 50 MG/1
50 TABLET ORAL EVERY 4 HOURS PRN
Qty: 42 TABLET | Refills: 0 | Status: SHIPPED | OUTPATIENT
Start: 2019-10-02 | End: 2019-10-09

## 2019-10-05 ENCOUNTER — CARE COORDINATION (OUTPATIENT)
Dept: CASE MANAGEMENT | Age: 64
End: 2019-10-05

## 2019-10-09 ENCOUNTER — CARE COORDINATION (OUTPATIENT)
Dept: CASE MANAGEMENT | Age: 64
End: 2019-10-09

## 2019-10-14 ENCOUNTER — TELEPHONE (OUTPATIENT)
Dept: ORTHOPEDIC SURGERY | Age: 64
End: 2019-10-14

## 2019-10-21 ENCOUNTER — OFFICE VISIT (OUTPATIENT)
Dept: ORTHOPEDIC SURGERY | Age: 64
End: 2019-10-21

## 2019-10-21 VITALS — WEIGHT: 293 LBS | HEIGHT: 65 IN | BODY MASS INDEX: 48.82 KG/M2

## 2019-10-21 DIAGNOSIS — M25.562 CHRONIC PAIN OF LEFT KNEE: ICD-10-CM

## 2019-10-21 DIAGNOSIS — Z96.652 STATUS POST TOTAL LEFT KNEE REPLACEMENT: Primary | ICD-10-CM

## 2019-10-21 DIAGNOSIS — G89.29 CHRONIC PAIN OF LEFT KNEE: ICD-10-CM

## 2019-10-21 PROCEDURE — 99024 POSTOP FOLLOW-UP VISIT: CPT | Performed by: ORTHOPAEDIC SURGERY

## 2019-10-23 ENCOUNTER — CARE COORDINATION (OUTPATIENT)
Dept: CASE MANAGEMENT | Age: 64
End: 2019-10-23

## 2019-10-28 ENCOUNTER — CARE COORDINATION (OUTPATIENT)
Dept: CASE MANAGEMENT | Age: 64
End: 2019-10-28

## 2019-10-30 ENCOUNTER — HOSPITAL ENCOUNTER (OUTPATIENT)
Dept: WOUND CARE | Age: 64
Discharge: HOME | End: 2019-10-30
Payer: MEDICARE

## 2019-10-30 DIAGNOSIS — T81.89XA DELAYED SURGICAL WOUND HEALING, INITIAL ENCOUNTER: ICD-10-CM

## 2019-10-30 DIAGNOSIS — S81.002A OPEN WOUND OF LEFT KNEE, INITIAL ENCOUNTER: ICD-10-CM

## 2019-10-30 PROCEDURE — 11042 DBRDMT SUBQ TIS 1ST 20SQCM/<: CPT

## 2019-10-30 PROCEDURE — 99213 OFFICE O/P EST LOW 20 MIN: CPT

## 2019-10-30 RX ORDER — LIDOCAINE HYDROCHLORIDE 40 MG/ML
SOLUTION TOPICAL ONCE
Status: DISCONTINUED | OUTPATIENT
Start: 2019-10-30 | End: 2019-10-31 | Stop reason: HOSPADM

## 2019-11-06 ENCOUNTER — HOSPITAL ENCOUNTER (OUTPATIENT)
Dept: WOUND CARE | Age: 64
Discharge: HOME | End: 2019-11-06
Payer: MEDICARE

## 2019-11-06 VITALS
SYSTOLIC BLOOD PRESSURE: 122 MMHG | DIASTOLIC BLOOD PRESSURE: 76 MMHG | HEIGHT: 65 IN | TEMPERATURE: 96.6 F | RESPIRATION RATE: 16 BRPM | BODY MASS INDEX: 48.82 KG/M2 | WEIGHT: 293 LBS | HEART RATE: 63 BPM

## 2019-11-06 PROCEDURE — 11042 DBRDMT SUBQ TIS 1ST 20SQCM/<: CPT

## 2019-11-11 ENCOUNTER — CARE COORDINATION (OUTPATIENT)
Dept: CASE MANAGEMENT | Age: 64
End: 2019-11-11

## 2019-11-14 ENCOUNTER — OFFICE VISIT (OUTPATIENT)
Dept: ORTHOPEDIC SURGERY | Age: 64
End: 2019-11-14

## 2019-11-14 VITALS — WEIGHT: 293 LBS | BODY MASS INDEX: 48.82 KG/M2 | HEIGHT: 65 IN

## 2019-11-14 DIAGNOSIS — Z96.652 STATUS POST TOTAL LEFT KNEE REPLACEMENT: Primary | ICD-10-CM

## 2019-11-14 DIAGNOSIS — M17.11 PRIMARY OSTEOARTHRITIS OF RIGHT KNEE: ICD-10-CM

## 2019-11-14 PROCEDURE — 99024 POSTOP FOLLOW-UP VISIT: CPT | Performed by: ORTHOPAEDIC SURGERY

## 2019-11-18 ENCOUNTER — CARE COORDINATION (OUTPATIENT)
Dept: CASE MANAGEMENT | Age: 64
End: 2019-11-18

## 2019-11-20 ENCOUNTER — HOSPITAL ENCOUNTER (OUTPATIENT)
Dept: WOUND CARE | Age: 64
Discharge: HOME | End: 2019-11-20
Payer: MEDICARE

## 2019-11-20 VITALS
DIASTOLIC BLOOD PRESSURE: 73 MMHG | TEMPERATURE: 96.9 F | SYSTOLIC BLOOD PRESSURE: 117 MMHG | HEART RATE: 60 BPM | HEIGHT: 65 IN | RESPIRATION RATE: 16 BRPM | BODY MASS INDEX: 48.82 KG/M2 | WEIGHT: 293 LBS

## 2019-11-20 PROCEDURE — 99212 OFFICE O/P EST SF 10 MIN: CPT

## 2019-11-20 RX ORDER — LIDOCAINE HYDROCHLORIDE 40 MG/ML
SOLUTION TOPICAL ONCE
Status: DISCONTINUED | OUTPATIENT
Start: 2019-11-20 | End: 2019-11-21 | Stop reason: HOSPADM

## 2019-12-05 ENCOUNTER — OFFICE VISIT (OUTPATIENT)
Dept: ORTHOPEDIC SURGERY | Age: 64
End: 2019-12-05

## 2019-12-05 VITALS — WEIGHT: 293 LBS | HEIGHT: 67 IN | BODY MASS INDEX: 45.99 KG/M2

## 2019-12-05 DIAGNOSIS — Z96.652 STATUS POST TOTAL LEFT KNEE REPLACEMENT: Primary | ICD-10-CM

## 2019-12-05 DIAGNOSIS — M17.0 PRIMARY OSTEOARTHRITIS OF BOTH KNEES: ICD-10-CM

## 2019-12-05 PROCEDURE — 99024 POSTOP FOLLOW-UP VISIT: CPT | Performed by: ORTHOPAEDIC SURGERY

## 2019-12-18 ENCOUNTER — CARE COORDINATION (OUTPATIENT)
Dept: CASE MANAGEMENT | Age: 64
End: 2019-12-18

## 2020-02-06 ENCOUNTER — TELEPHONE (OUTPATIENT)
Dept: ORTHOPEDIC SURGERY | Age: 65
End: 2020-02-06

## 2020-02-07 ENCOUNTER — OFFICE VISIT (OUTPATIENT)
Dept: ORTHOPEDIC SURGERY | Age: 65
End: 2020-02-07
Payer: MEDICARE

## 2020-02-07 PROCEDURE — G8428 CUR MEDS NOT DOCUMENT: HCPCS | Performed by: ORTHOPAEDIC SURGERY

## 2020-02-07 PROCEDURE — G8400 PT W/DXA NO RESULTS DOC: HCPCS | Performed by: ORTHOPAEDIC SURGERY

## 2020-02-07 PROCEDURE — G8417 CALC BMI ABV UP PARAM F/U: HCPCS | Performed by: ORTHOPAEDIC SURGERY

## 2020-02-07 PROCEDURE — 1036F TOBACCO NON-USER: CPT | Performed by: ORTHOPAEDIC SURGERY

## 2020-02-07 PROCEDURE — 99213 OFFICE O/P EST LOW 20 MIN: CPT | Performed by: ORTHOPAEDIC SURGERY

## 2020-02-07 PROCEDURE — 4040F PNEUMOC VAC/ADMIN/RCVD: CPT | Performed by: ORTHOPAEDIC SURGERY

## 2020-02-07 PROCEDURE — 3017F COLORECTAL CA SCREEN DOC REV: CPT | Performed by: ORTHOPAEDIC SURGERY

## 2020-02-07 PROCEDURE — G8482 FLU IMMUNIZE ORDER/ADMIN: HCPCS | Performed by: ORTHOPAEDIC SURGERY

## 2020-02-07 PROCEDURE — 1123F ACP DISCUSS/DSCN MKR DOCD: CPT | Performed by: ORTHOPAEDIC SURGERY

## 2020-02-07 PROCEDURE — 1090F PRES/ABSN URINE INCON ASSESS: CPT | Performed by: ORTHOPAEDIC SURGERY

## 2020-02-07 NOTE — PROGRESS NOTES
Ayush Dumont M.D.            [address] [facility] [address] [facility], [address] [facility]           Dept Phone: [phone]           Dept Fax:  [address]           [place]wey          Dept Phone: [phone]           Dept Fax:  [phone]      Chief Compliant:  Chief Complaint   Patient presents with    Pain     9/17/19 Lt TKA        History of Present Illness:  [place] returns today. This is a 75 y.o. female who presents to the clinic today for follow up of left TKA on 9/17/19. She is doing great at this time. She is walking better with a walker. She is very happy with the outcome at this time. She is ready for the right side. Review of Systems   Constitutional: Negative for fever, chills, sweats, recent illness, or recent injury. Neurological: Negative for headaches, numbness, or weakness. Integumentary: Negative for rash, itching, ecchymosis, abrasions, or laceration. Musculoskeletal: Positive for Pain (9/17/19 Lt TKA)       Physical Exam:  Constitutional: Patient is oriented to person, place, and time. Patient appears well-developed and well nourished. HENT: Negative otherwise noted  Head: Normocephalic and Atraumatic  Nose: Normal  Eyes: Conjunctivae and EOM are normal  Neck: Normal range of motion Neck supple. Respiratory/Cardio: Effort normal. No respiratory distress. Musculoskeletal:  Right side is 15-90 degrees of motion with mid flexion instability. Neurological: Patient is alert and oriented to person, place, and time. Normal strenght. No sensory deficit. Skin: Skin is warm and dry. Incision site healing well. Psychiatric: Behavior is normal. Thought content normal.  Nursing note and vitals reviewed. Labs and Imaging:     None taken today. Assessment and Plan:  1.  History of total left knee replacement automated transcription, some errors in transcription may have occurred.

## 2020-02-18 ASSESSMENT — PROMIS GLOBAL HEALTH SCALE
SUM OF RESPONSES TO QUESTIONS 2, 4, 5, & 10: 8
IN GENERAL, PLEASE RATE HOW WELL YOU CARRY OUT YOUR USUAL SOCIAL ACTIVITIES (INCLUDES ACTIVITIES AT HOME, AT WORK, AND IN YOUR COMMUNITY, AND RESPONSIBILITIES AS A PARENT, CHILD, SPOUSE, EMPLOYEE, FRIEND, ETC) [ON A SCALE OF 1 (POOR) TO 5 (EXCELLENT)]?: 2
IN GENERAL, HOW WOULD YOU RATE YOUR PHYSICAL HEALTH [ON A SCALE OF 1 (POOR) TO 5 (EXCELLENT)]?: 2
IN THE PAST 7 DAYS, HOW WOULD YOU RATE YOUR PAIN ON AVERAGE [ON A SCALE FROM 0 (NO PAIN) TO 10 (WORST IMAGINABLE PAIN)]?: 2
IN GENERAL, WOULD YOU SAY YOUR HEALTH IS...[ON A SCALE OF 1 (POOR) TO 5 (EXCELLENT)]: 2
IN THE PAST 7 DAYS, HOW OFTEN HAVE YOU BEEN BOTHERED BY EMOTIONAL PROBLEMS, SUCH AS FEELING ANXIOUS, DEPRESSED, OR IRRITABLE [ON A SCALE FROM 1 (NEVER) TO 5 (ALWAYS)]?: 2
IN GENERAL, WOULD YOU SAY YOUR QUALITY OF LIFE IS...[ON A SCALE OF 1 (POOR) TO 5 (EXCELLENT)]: 2
IN THE PAST 7 DAYS, HOW WOULD YOU RATE YOUR FATIGUE ON AVERAGE [ON A SCALE FROM 1 (NONE) TO 5 (VERY SEVERE)]?: 3
IN GENERAL, HOW WOULD YOU RATE YOUR SATISFACTION WITH YOUR SOCIAL ACTIVITIES AND RELATIONSHIPS [ON A SCALE OF 1 (POOR) TO 5 (EXCELLENT)]?: 2
TO WHAT EXTENT ARE YOU ABLE TO CARRY OUT YOUR EVERYDAY PHYSICAL ACTIVITIES SUCH AS WALKING, CLIMBING STAIRS, CARRYING GROCERIES, OR MOVING A CHAIR [ON A SCALE OF 1 (NOT AT ALL) TO 5 (COMPLETELY)]?: 3
IN GENERAL, HOW WOULD YOU RATE YOUR MENTAL HEALTH, INCLUDING YOUR MOOD AND YOUR ABILITY TO THINK [ON A SCALE OF 1 (POOR) TO 5 (EXCELLENT)]?: 2
HOW IS THE PROMIS V1.1 BEING ADMINISTERED?: 2
SUM OF RESPONSES TO QUESTIONS 3, 6, 7, & 8: 10
WHO IS THE PERSON COMPLETING THE PROMIS V1.1 SURVEY?: 0

## 2020-02-18 ASSESSMENT — KOOS JR
GOING UP OR DOWN STAIRS: 2
RISING FROM SITTING: 2
BENDING TO THE FLOOR TO PICK UP OBJECT: 1
HOW SEVERE IS YOUR KNEE STIFFNESS AFTER FIRST WAKING IN MORNING: 0
STANDING UPRIGHT: 2
STRAIGHTENING KNEE FULLY: 3
TWISING OR PIVOTING ON KNEE: 2

## 2020-02-25 ENCOUNTER — HOSPITAL ENCOUNTER (OUTPATIENT)
Dept: PREADMISSION TESTING | Age: 65
Discharge: HOME | End: 2020-02-29
Payer: MEDICARE

## 2020-02-25 VITALS
RESPIRATION RATE: 18 BRPM | WEIGHT: 293 LBS | BODY MASS INDEX: 48.82 KG/M2 | OXYGEN SATURATION: 100 % | DIASTOLIC BLOOD PRESSURE: 75 MMHG | SYSTOLIC BLOOD PRESSURE: 121 MMHG | HEART RATE: 60 BPM | HEIGHT: 65 IN | TEMPERATURE: 97.2 F

## 2020-02-25 LAB
-: ABNORMAL
ABSOLUTE EOS #: 0.2 K/UL (ref 0–0.4)
ABSOLUTE IMMATURE GRANULOCYTE: ABNORMAL K/UL (ref 0–0.3)
ABSOLUTE LYMPH #: 2.1 K/UL (ref 1–4.8)
ABSOLUTE MONO #: 0.3 K/UL (ref 0.1–1.3)
AMORPHOUS: ABNORMAL
ANION GAP SERPL CALCULATED.3IONS-SCNC: 16 MMOL/L (ref 9–17)
BACTERIA: ABNORMAL
BASOPHILS # BLD: 1 % (ref 0–2)
BASOPHILS ABSOLUTE: 0 K/UL (ref 0–0.2)
BILIRUBIN URINE: NEGATIVE
BUN BLDV-MCNC: 44 MG/DL (ref 8–23)
BUN/CREAT BLD: ABNORMAL (ref 9–20)
CALCIUM SERPL-MCNC: 9.3 MG/DL (ref 8.6–10.4)
CASTS UA: ABNORMAL /LPF
CHLORIDE BLD-SCNC: 107 MMOL/L (ref 98–107)
CO2: 20 MMOL/L (ref 20–31)
COLOR: YELLOW
COMMENT UA: ABNORMAL
CREAT SERPL-MCNC: 1.63 MG/DL (ref 0.5–0.9)
CRYSTALS, UA: ABNORMAL /HPF
DIFFERENTIAL TYPE: ABNORMAL
EOSINOPHILS RELATIVE PERCENT: 3 % (ref 0–4)
EPITHELIAL CELLS UA: ABNORMAL /HPF
GFR AFRICAN AMERICAN: 38 ML/MIN
GFR NON-AFRICAN AMERICAN: 32 ML/MIN
GFR SERPL CREATININE-BSD FRML MDRD: ABNORMAL ML/MIN/{1.73_M2}
GFR SERPL CREATININE-BSD FRML MDRD: ABNORMAL ML/MIN/{1.73_M2}
GLUCOSE BLD-MCNC: 104 MG/DL (ref 70–99)
GLUCOSE URINE: NEGATIVE
HCT VFR BLD CALC: 39.8 % (ref 36–46)
HEMOGLOBIN: 12.8 G/DL (ref 12–16)
IMMATURE GRANULOCYTES: ABNORMAL %
KETONES, URINE: NEGATIVE
LEUKOCYTE ESTERASE, URINE: ABNORMAL
LYMPHOCYTES # BLD: 34 % (ref 24–44)
MCH RBC QN AUTO: 28.6 PG (ref 26–34)
MCHC RBC AUTO-ENTMCNC: 32.1 G/DL (ref 31–37)
MCV RBC AUTO: 89 FL (ref 80–100)
MONOCYTES # BLD: 5 % (ref 1–7)
MUCUS: ABNORMAL
NITRITE, URINE: POSITIVE
NRBC AUTOMATED: ABNORMAL PER 100 WBC
OTHER OBSERVATIONS UA: ABNORMAL
PDW BLD-RTO: 15.3 % (ref 11.5–14.9)
PH UA: 5 (ref 5–8)
PLATELET # BLD: 246 K/UL (ref 150–450)
PLATELET ESTIMATE: ABNORMAL
PMV BLD AUTO: 8 FL (ref 6–12)
POTASSIUM SERPL-SCNC: 4.1 MMOL/L (ref 3.7–5.3)
PROTEIN UA: ABNORMAL
RBC # BLD: 4.48 M/UL (ref 4–5.2)
RBC # BLD: ABNORMAL 10*6/UL
RBC UA: ABNORMAL /HPF
RENAL EPITHELIAL, UA: ABNORMAL /HPF
SEG NEUTROPHILS: 57 % (ref 36–66)
SEGMENTED NEUTROPHILS ABSOLUTE COUNT: 3.6 K/UL (ref 1.3–9.1)
SODIUM BLD-SCNC: 143 MMOL/L (ref 135–144)
SPECIFIC GRAVITY UA: 1.02 (ref 1–1.03)
TRICHOMONAS: ABNORMAL
TURBIDITY: ABNORMAL
URINE HGB: ABNORMAL
UROBILINOGEN, URINE: NORMAL
WBC # BLD: 6.2 K/UL (ref 3.5–11)
WBC # BLD: ABNORMAL 10*3/UL
WBC UA: ABNORMAL /HPF
YEAST: ABNORMAL

## 2020-02-25 PROCEDURE — 36415 COLL VENOUS BLD VENIPUNCTURE: CPT

## 2020-02-25 PROCEDURE — 87641 MR-STAPH DNA AMP PROBE: CPT

## 2020-02-25 PROCEDURE — 80048 BASIC METABOLIC PNL TOTAL CA: CPT

## 2020-02-25 PROCEDURE — 85025 COMPLETE CBC W/AUTO DIFF WBC: CPT

## 2020-02-25 PROCEDURE — 87186 SC STD MICRODIL/AGAR DIL: CPT

## 2020-02-25 PROCEDURE — 93005 ELECTROCARDIOGRAM TRACING: CPT | Performed by: ANESTHESIOLOGY

## 2020-02-25 PROCEDURE — 87086 URINE CULTURE/COLONY COUNT: CPT

## 2020-02-25 PROCEDURE — 81001 URINALYSIS AUTO W/SCOPE: CPT

## 2020-02-25 PROCEDURE — 87077 CULTURE AEROBIC IDENTIFY: CPT

## 2020-02-25 ASSESSMENT — PAIN DESCRIPTION - PROGRESSION: CLINICAL_PROGRESSION: GRADUALLY WORSENING

## 2020-02-25 ASSESSMENT — PAIN DESCRIPTION - DESCRIPTORS: DESCRIPTORS: ACHING;CONSTANT

## 2020-02-25 ASSESSMENT — PAIN DESCRIPTION - LOCATION: LOCATION: KNEE

## 2020-02-25 ASSESSMENT — PAIN DESCRIPTION - ONSET: ONSET: ON-GOING

## 2020-02-25 ASSESSMENT — PAIN DESCRIPTION - ORIENTATION: ORIENTATION: RIGHT

## 2020-02-25 ASSESSMENT — PAIN SCALES - GENERAL: PAINLEVEL_OUTOF10: 7

## 2020-02-25 ASSESSMENT — PAIN DESCRIPTION - PAIN TYPE: TYPE: CHRONIC PAIN

## 2020-02-25 ASSESSMENT — PAIN DESCRIPTION - FREQUENCY: FREQUENCY: CONTINUOUS

## 2020-02-25 NOTE — H&P
HISTORY and [facility]       NAME:  Anai Lee  MRN: [id number]   YOB: 1955   Date: 2020   Age: 75 y.o. Gender: female       COMPLAINT AND PRESENT HISTORY:     Anai Lee is 75 y.o.,   female, undergoing preadmission testing for right Knee O. A. Patient C/O of pain swelling, stiffness, popping and cracking in the right Knee. Pt describes the pain as 8/10 in intensity at times. Symptoms started 10 years ago. The knee does not buckle, give way under the patient. No locking up, No recent falls or trauma. No redness, swelling or rashes.   No Hx of MRSA infections in the past.  Pt had Lt Hip replacement 2019    PAST MEDICAL HISTORY     Past Medical History:   Diagnosis Date    Anemia     on iron    Arthritis     Colostomy in place [facility])     Diverticulitis     Hx, had a rupture one that ended up in a colostomy    DJD (degenerative joint disease) of knee     H/O hand fracture     right hand was casted, no surgery    Hypertension     Morbid obesity with BMI of 50.0-59.9, adult ([facility] [address]     Vitamin D deficiency     Wears glasses     Wears partial dentures     upper and lower       SURGICAL HISTORY       Past Surgical History:   Procedure Laterality Date    CARPAL TUNNEL RELEASE Right 2014    CARPAL TUNNEL RELEASE Left 09/10/2014     SECTION      X2    CHOLECYSTECTOMY      COLON SURGERY      ATTEMPTED TO REVERSE COLOSTOMY UNSUCCESSFUL    COLONOSCOPY      COLOSTOMY      for ruptured diverticuli    GASTRIC BYPASS SURGERY      HAND SURGERY Left 2019    to shave down bone growth    JOINT REPLACEMENT Left 2019    TONSILLECTOMY      TOTAL KNEE ARTHROPLASTY Left 2019    KNEE TOTAL ARTHROPLASTY performed by Priscila Vuong MD at [address] [place] [address]       Family History   Problem Relation Age of Onset    Diabetes Mother     Heart Disease Father     Diabetes Father     Heart Failure Father SOCIAL HISTORY       Social History     Socioeconomic History    Marital status:       Spouse name: None    Number of children: None    Years of education: None    Highest education level: None   Occupational History    None   Social Needs    Financial resource strain: None    Food insecurity:     Worry: None     Inability: None    Transportation needs:     Medical: None     Non-medical: None   Tobacco Use    Smoking status: Former Smoker     Packs/day: 0.50     Years: 36.00     Pack years: 18.00     Last attempt to quit: 2012     Years since quittin.0    Smokeless tobacco: Never Used   Substance and Sexual Activity    Alcohol use: No    Drug use: No    Sexual activity: None   Lifestyle    Physical activity:     Days per week: None     Minutes per session: None    Stress: None   Relationships    Social connections:     Talks on phone: None     Gets together: None     Attends Congregation service: None     Active member of club or organization: None     Attends meetings of clubs or organizations: None     Relationship status: None    Intimate partner violence:     Fear of current or ex partner: None     Emotionally abused: None     Physically abused: None     Forced sexual activity: None   Other Topics Concern    None   Social History Narrative    None           REVIEW OF SYSTEMS      Allergies   Allergen Reactions    Shellfish-Derived Products      Hives, nausea       Current Outpatient Medications on File Prior to Encounter   Medication Sig Dispense Refill    sertraline (ZOLOFT) 50 MG tablet Take 50 mg by mouth nightly      Calcium Carbonate Antacid (TUMS PO) Take 1 tablet by mouth 3 times daily      Cholecalciferol (VITAMIN D3) 42683 units CAPS Take 1 capsule by mouth once a week      furosemide (LASIX) 40 MG tablet Take 40 mg by mouth daily      spironolactone (ALDACTONE) 50 MG tablet Take 50 mg by mouth daily      omeprazole (PRILOSEC) 20 MG delayed release capsule adventitious sounds. ABDOMEN:  Obese. Soft on palpation. No localized tenderness. No guarding or rigidity. No palpable hepatosplenomegaly. LYMPHATICS:  No palpable cervical lymphadenopathy. LOCOMOTOR, BACK AND SPINE:  No tenderness or deformities. EXTREMITIES:  Symmetrical, no pretibial edema. Tenderness on palpation of the Lt Knee joint space with limitation in the ROM. Yuans sign negative. No discoloration or ulcerations. NEUROLOGIC:  The patient is conscious, alert, oriented,Cranial nerve II-XII intact, taste and smell were not examined. No apparent focal sensory or motor deficits. PROVISIONAL DIAGNOSES / SURGERY:      Rt Knee O. A. Rt Total Knee Arthroplasty.     Patient Active Problem List    Diagnosis Date Noted    Status post total left knee replacement 11/14/2019    Primary osteoarthritis of right knee 11/14/2019    Open wound of left knee 10/30/2019    Delayed surgical wound healing 10/30/2019    Hyperkalemia 09/19/2019    Acute gastritis without hemorrhage 09/19/2019    Primary osteoarthritis of left knee 09/17/2019    Urinary tract infection without hematuria 05/24/2019    Acute kidney injury ([address] 05/23/2019    Anasarca 05/21/2019    Intermittent asthma 05/02/2017    Essential hypertension     SBO (small bowel obstruction) ([address] 05/01/2017    Ventral hernia 05/01/2017    Morbid obesity due to excess calories ([address] 05/01/2017    Primary cough headache 04/01/2017    Asthmatic bronchitis with acute exacerbation 03/31/2017    Primary osteoarthritis of both knees 03/31/2017           SIDDHARTHA PARKS PA-C on 2/25/2020 at 10:51 AM

## 2020-02-25 NOTE — H&P (VIEW-ONLY)
HISTORY and [facility]       NAME:  Alonso Ta  MRN: [id number]   YOB: 1955   Date: 2020   Age: 75 y.o. Gender: female       COMPLAINT AND PRESENT HISTORY:     Alonso Ta is 75 y.o.,   female, undergoing preadmission testing for right Knee O. A. Patient C/O of pain swelling, stiffness, popping and cracking in the right Knee. Pt describes the pain as 8/10 in intensity at times. Symptoms started 10 years ago. The knee does not buckle, give way under the patient. No locking up, No recent falls or trauma. No redness, swelling or rashes.   No Hx of MRSA infections in the past.  Pt had Lt Hip replacement 2019    PAST MEDICAL HISTORY     Past Medical History:   Diagnosis Date    Anemia     on iron    Arthritis     Colostomy in place [facility])     Diverticulitis     Hx, had a rupture one that ended up in a colostomy    DJD (degenerative joint disease) of knee     H/O hand fracture     right hand was casted, no surgery    Hypertension     Morbid obesity with BMI of 50.0-59.9, adult ([address]     Vitamin D deficiency     Wears glasses     Wears partial dentures     upper and lower       SURGICAL HISTORY       Past Surgical History:   Procedure Laterality Date    CARPAL TUNNEL RELEASE Right 2014    CARPAL TUNNEL RELEASE Left 09/10/2014     SECTION      X2    CHOLECYSTECTOMY      COLON SURGERY      ATTEMPTED TO REVERSE COLOSTOMY UNSUCCESSFUL    COLONOSCOPY      COLOSTOMY      for ruptured diverticuli    GASTRIC BYPASS SURGERY  2000    HAND SURGERY Left 2019    to shave down bone growth    JOINT REPLACEMENT Left 2019    TONSILLECTOMY      TOTAL KNEE ARTHROPLASTY Left 2019    KNEE TOTAL ARTHROPLASTY performed by Jaelyn Garcia MD at [address] [place] [address]       Family History   Problem Relation Age of Onset    Diabetes Mother     Heart Disease Father     Diabetes Father     Heart Failure Father

## 2020-02-26 ENCOUNTER — TELEPHONE (OUTPATIENT)
Dept: ORTHOPEDIC SURGERY | Age: 65
End: 2020-02-26

## 2020-02-26 LAB
MRSA, DNA, NASAL: NORMAL
SPECIMEN DESCRIPTION: NORMAL

## 2020-02-26 RX ORDER — CIPROFLOXACIN 750 MG/1
750 TABLET, FILM COATED ORAL 2 TIMES DAILY
Qty: 20 TABLET | Refills: 0 | Status: SHIPPED | OUTPATIENT
Start: 2020-02-26 | End: 2020-03-07

## 2020-02-27 LAB
EKG ATRIAL RATE: 69 BPM
EKG P AXIS: 69 DEGREES
EKG P-R INTERVAL: 196 MS
EKG Q-T INTERVAL: 464 MS
EKG QRS DURATION: 90 MS
EKG QTC CALCULATION (BAZETT): 497 MS
EKG R AXIS: 7 DEGREES
EKG T AXIS: 38 DEGREES
EKG VENTRICULAR RATE: 69 BPM

## 2020-02-28 LAB
CULTURE: ABNORMAL
Lab: ABNORMAL
SPECIMEN DESCRIPTION: ABNORMAL

## 2020-03-03 ENCOUNTER — TELEPHONE (OUTPATIENT)
Dept: ORTHOPEDIC SURGERY | Age: 65
End: 2020-03-03

## 2020-03-10 ENCOUNTER — ANESTHESIA (OUTPATIENT)
Dept: OPERATING ROOM | Age: 65
DRG: 469 | End: 2020-03-10
Payer: MEDICARE

## 2020-03-10 ENCOUNTER — APPOINTMENT (OUTPATIENT)
Dept: GENERAL RADIOLOGY | Age: 65
DRG: 469 | End: 2020-03-10
Attending: ORTHOPAEDIC SURGERY
Payer: MEDICARE

## 2020-03-10 ENCOUNTER — HOSPITAL ENCOUNTER (INPATIENT)
Age: 65
LOS: 6 days | Discharge: NURSING, REHAB OR LONG-TERM CARE | DRG: 469 | End: 2020-03-17
Attending: ORTHOPAEDIC SURGERY | Admitting: ORTHOPAEDIC SURGERY
Payer: MEDICARE

## 2020-03-10 ENCOUNTER — ANESTHESIA EVENT (OUTPATIENT)
Dept: OPERATING ROOM | Age: 65
DRG: 469 | End: 2020-03-10
Payer: MEDICARE

## 2020-03-10 VITALS — TEMPERATURE: 95.5 F | OXYGEN SATURATION: 99 % | DIASTOLIC BLOOD PRESSURE: 57 MMHG | SYSTOLIC BLOOD PRESSURE: 101 MMHG

## 2020-03-10 PROCEDURE — 6360000002 HC RX W HCPCS: Performed by: ORTHOPAEDIC SURGERY

## 2020-03-10 PROCEDURE — 2500000003 HC RX 250 WO HCPCS: Performed by: ORTHOPAEDIC SURGERY

## 2020-03-10 PROCEDURE — 6370000000 HC RX 637 (ALT 250 FOR IP): Performed by: ORTHOPAEDIC SURGERY

## 2020-03-10 PROCEDURE — 6360000002 HC RX W HCPCS: Performed by: ANESTHESIOLOGY

## 2020-03-10 PROCEDURE — 2580000003 HC RX 258: Performed by: ORTHOPAEDIC SURGERY

## 2020-03-10 PROCEDURE — 3600000003 HC SURGERY LEVEL 3 BASE: Performed by: ORTHOPAEDIC SURGERY

## 2020-03-10 PROCEDURE — 2720000010 HC SURG SUPPLY STERILE: Performed by: ORTHOPAEDIC SURGERY

## 2020-03-10 PROCEDURE — C9290 INJ, BUPIVACAINE LIPOSOME: HCPCS | Performed by: ANESTHESIOLOGY

## 2020-03-10 PROCEDURE — 2500000003 HC RX 250 WO HCPCS: Performed by: ANESTHESIOLOGY

## 2020-03-10 PROCEDURE — 27447 TOTAL KNEE ARTHROPLASTY: CPT | Performed by: ORTHOPAEDIC SURGERY

## 2020-03-10 PROCEDURE — 7100000001 HC PACU RECOVERY - ADDTL 15 MIN: Performed by: ORTHOPAEDIC SURGERY

## 2020-03-10 PROCEDURE — C1713 ANCHOR/SCREW BN/BN,TIS/BN: HCPCS | Performed by: ORTHOPAEDIC SURGERY

## 2020-03-10 PROCEDURE — 7100000000 HC PACU RECOVERY - FIRST 15 MIN: Performed by: ORTHOPAEDIC SURGERY

## 2020-03-10 PROCEDURE — 3700000001 HC ADD 15 MINUTES (ANESTHESIA): Performed by: ORTHOPAEDIC SURGERY

## 2020-03-10 PROCEDURE — 2709999900 HC NON-CHARGEABLE SUPPLY: Performed by: ORTHOPAEDIC SURGERY

## 2020-03-10 PROCEDURE — 2580000003 HC RX 258: Performed by: ANESTHESIOLOGY

## 2020-03-10 PROCEDURE — 3700000000 HC ANESTHESIA ATTENDED CARE: Performed by: ORTHOPAEDIC SURGERY

## 2020-03-10 PROCEDURE — 6360000002 HC RX W HCPCS: Performed by: NURSE ANESTHETIST, CERTIFIED REGISTERED

## 2020-03-10 PROCEDURE — G0378 HOSPITAL OBSERVATION PER HR: HCPCS

## 2020-03-10 PROCEDURE — 73560 X-RAY EXAM OF KNEE 1 OR 2: CPT

## 2020-03-10 PROCEDURE — 0SRC0J9 REPLACEMENT OF RIGHT KNEE JOINT WITH SYNTHETIC SUBSTITUTE, CEMENTED, OPEN APPROACH: ICD-10-PCS | Performed by: ORTHOPAEDIC SURGERY

## 2020-03-10 PROCEDURE — 3600000013 HC SURGERY LEVEL 3 ADDTL 15MIN: Performed by: ORTHOPAEDIC SURGERY

## 2020-03-10 PROCEDURE — 97161 PT EVAL LOW COMPLEX 20 MIN: CPT

## 2020-03-10 PROCEDURE — 2500000003 HC RX 250 WO HCPCS: Performed by: NURSE ANESTHETIST, CERTIFIED REGISTERED

## 2020-03-10 PROCEDURE — 64447 NJX AA&/STRD FEMORAL NRV IMG: CPT | Performed by: ANESTHESIOLOGY

## 2020-03-10 PROCEDURE — 3E0T3BZ INTRODUCTION OF ANESTHETIC AGENT INTO PERIPHERAL NERVES AND PLEXI, PERCUTANEOUS APPROACH: ICD-10-PCS | Performed by: ANESTHESIOLOGY

## 2020-03-10 PROCEDURE — C1776 JOINT DEVICE (IMPLANTABLE): HCPCS | Performed by: ORTHOPAEDIC SURGERY

## 2020-03-10 DEVICE — AXLE FEM STD KNEE OSS: Type: IMPLANTABLE DEVICE | Site: KNEE | Status: FUNCTIONAL

## 2020-03-10 DEVICE — BASEPLATE TIB L75MM LNG KNEE CO CHROM NONMODULAR OSS: Type: IMPLANTABLE DEVICE | Site: KNEE | Status: FUNCTIONAL

## 2020-03-10 DEVICE — IMPLANTABLE DEVICE: Type: IMPLANTABLE DEVICE | Site: KNEE | Status: FUNCTIONAL

## 2020-03-10 DEVICE — BEARING TIB THK16MM KNEE UHMWPE ORTH SALV SYS: Type: IMPLANTABLE DEVICE | Site: KNEE | Status: FUNCTIONAL

## 2020-03-10 DEVICE — COMPONENT FEM KNEE YOKE REINF ORTH SALV SYS: Type: IMPLANTABLE DEVICE | Site: KNEE | Status: FUNCTIONAL

## 2020-03-10 DEVICE — CEMENT BNE 40GM HI VISC RADPQ FOR REV SURG: Type: IMPLANTABLE DEVICE | Site: KNEE | Status: FUNCTIONAL

## 2020-03-10 DEVICE — PIN FIX POLY LOK OSS: Type: IMPLANTABLE DEVICE | Site: KNEE | Status: FUNCTIONAL

## 2020-03-10 DEVICE — BUSHING TIB POLY LO FRIC INTFACE OSS: Type: IMPLANTABLE DEVICE | Site: KNEE | Status: FUNCTIONAL

## 2020-03-10 DEVICE — BUSHING FEM KNEE ARCM POLY LO FRIC INTFACE AXLE AND REINF: Type: IMPLANTABLE DEVICE | Site: KNEE | Status: FUNCTIONAL

## 2020-03-10 DEVICE — COMPONENT PAT DIA34MM THK7.8MM THN KNEE POLY 3 PEG SER A: Type: IMPLANTABLE DEVICE | Site: KNEE | Status: FUNCTIONAL

## 2020-03-10 RX ORDER — METOCLOPRAMIDE HYDROCHLORIDE 5 MG/ML
10 INJECTION INTRAMUSCULAR; INTRAVENOUS
Status: DISCONTINUED | OUTPATIENT
Start: 2020-03-10 | End: 2020-03-10 | Stop reason: HOSPADM

## 2020-03-10 RX ORDER — PHENYLEPHRINE HYDROCHLORIDE 10 MG/ML
INJECTION INTRAVENOUS PRN
Status: DISCONTINUED | OUTPATIENT
Start: 2020-03-10 | End: 2020-03-10 | Stop reason: SDUPTHER

## 2020-03-10 RX ORDER — BUPIVACAINE HYDROCHLORIDE 5 MG/ML
INJECTION, SOLUTION EPIDURAL; INTRACAUDAL
Status: COMPLETED | OUTPATIENT
Start: 2020-03-10 | End: 2020-03-10

## 2020-03-10 RX ORDER — FENTANYL CITRATE 50 UG/ML
INJECTION, SOLUTION INTRAMUSCULAR; INTRAVENOUS PRN
Status: DISCONTINUED | OUTPATIENT
Start: 2020-03-10 | End: 2020-03-10 | Stop reason: SDUPTHER

## 2020-03-10 RX ORDER — HYDROCODONE BITARTRATE AND ACETAMINOPHEN 5; 325 MG/1; MG/1
1 TABLET ORAL PRN
Status: DISCONTINUED | OUTPATIENT
Start: 2020-03-10 | End: 2020-03-10 | Stop reason: HOSPADM

## 2020-03-10 RX ORDER — MEPERIDINE HYDROCHLORIDE 25 MG/ML
12.5 INJECTION INTRAMUSCULAR; INTRAVENOUS; SUBCUTANEOUS EVERY 5 MIN PRN
Status: DISCONTINUED | OUTPATIENT
Start: 2020-03-10 | End: 2020-03-10 | Stop reason: HOSPADM

## 2020-03-10 RX ORDER — EPHEDRINE SULFATE/0.9% NACL/PF 50 MG/5 ML
SYRINGE (ML) INTRAVENOUS PRN
Status: DISCONTINUED | OUTPATIENT
Start: 2020-03-10 | End: 2020-03-10 | Stop reason: SDUPTHER

## 2020-03-10 RX ORDER — TRANEXAMIC ACID 100 MG/ML
INJECTION, SOLUTION INTRAVENOUS PRN
Status: DISCONTINUED | OUTPATIENT
Start: 2020-03-10 | End: 2020-03-10 | Stop reason: SDUPTHER

## 2020-03-10 RX ORDER — OXYCODONE HYDROCHLORIDE 5 MG/1
5 TABLET ORAL EVERY 4 HOURS PRN
Status: DISCONTINUED | OUTPATIENT
Start: 2020-03-10 | End: 2020-03-17 | Stop reason: HOSPADM

## 2020-03-10 RX ORDER — DIPHENHYDRAMINE HYDROCHLORIDE 50 MG/ML
12.5 INJECTION INTRAMUSCULAR; INTRAVENOUS
Status: DISCONTINUED | OUTPATIENT
Start: 2020-03-10 | End: 2020-03-10 | Stop reason: HOSPADM

## 2020-03-10 RX ORDER — ACETAMINOPHEN 500 MG
1000 TABLET ORAL ONCE
Status: COMPLETED | OUTPATIENT
Start: 2020-03-10 | End: 2020-03-10

## 2020-03-10 RX ORDER — LABETALOL 20 MG/4 ML (5 MG/ML) INTRAVENOUS SYRINGE
5 EVERY 10 MIN PRN
Status: DISCONTINUED | OUTPATIENT
Start: 2020-03-10 | End: 2020-03-10 | Stop reason: HOSPADM

## 2020-03-10 RX ORDER — HYDRALAZINE HYDROCHLORIDE 20 MG/ML
5 INJECTION INTRAMUSCULAR; INTRAVENOUS EVERY 10 MIN PRN
Status: DISCONTINUED | OUTPATIENT
Start: 2020-03-10 | End: 2020-03-10 | Stop reason: HOSPADM

## 2020-03-10 RX ORDER — FENTANYL CITRATE 50 UG/ML
50 INJECTION, SOLUTION INTRAMUSCULAR; INTRAVENOUS EVERY 5 MIN PRN
Status: DISCONTINUED | OUTPATIENT
Start: 2020-03-10 | End: 2020-03-10 | Stop reason: HOSPADM

## 2020-03-10 RX ORDER — OXYCODONE HYDROCHLORIDE 10 MG/1
10 TABLET ORAL EVERY 4 HOURS PRN
Status: DISCONTINUED | OUTPATIENT
Start: 2020-03-10 | End: 2020-03-12

## 2020-03-10 RX ORDER — HYDROCODONE BITARTRATE AND ACETAMINOPHEN 5; 325 MG/1; MG/1
2 TABLET ORAL PRN
Status: DISCONTINUED | OUTPATIENT
Start: 2020-03-10 | End: 2020-03-10 | Stop reason: HOSPADM

## 2020-03-10 RX ORDER — SCOLOPAMINE TRANSDERMAL SYSTEM 1 MG/1
1 PATCH, EXTENDED RELEASE TRANSDERMAL ONCE
Status: DISCONTINUED | OUTPATIENT
Start: 2020-03-10 | End: 2020-03-13

## 2020-03-10 RX ORDER — SODIUM CHLORIDE 0.9 % (FLUSH) 0.9 %
10 SYRINGE (ML) INJECTION PRN
Status: DISCONTINUED | OUTPATIENT
Start: 2020-03-10 | End: 2020-03-17 | Stop reason: HOSPADM

## 2020-03-10 RX ORDER — SODIUM CHLORIDE 9 MG/ML
INJECTION, SOLUTION INTRAVENOUS CONTINUOUS
Status: DISCONTINUED | OUTPATIENT
Start: 2020-03-10 | End: 2020-03-12

## 2020-03-10 RX ORDER — GLYCOPYRROLATE 1 MG/5 ML
SYRINGE (ML) INTRAVENOUS PRN
Status: DISCONTINUED | OUTPATIENT
Start: 2020-03-10 | End: 2020-03-10 | Stop reason: SDUPTHER

## 2020-03-10 RX ORDER — ROCURONIUM BROMIDE 10 MG/ML
INJECTION, SOLUTION INTRAVENOUS PRN
Status: DISCONTINUED | OUTPATIENT
Start: 2020-03-10 | End: 2020-03-10 | Stop reason: SDUPTHER

## 2020-03-10 RX ORDER — SODIUM CHLORIDE, SODIUM LACTATE, POTASSIUM CHLORIDE, CALCIUM CHLORIDE 600; 310; 30; 20 MG/100ML; MG/100ML; MG/100ML; MG/100ML
INJECTION, SOLUTION INTRAVENOUS CONTINUOUS
Status: DISCONTINUED | OUTPATIENT
Start: 2020-03-10 | End: 2020-03-12

## 2020-03-10 RX ORDER — SENNA AND DOCUSATE SODIUM 50; 8.6 MG/1; MG/1
1 TABLET, FILM COATED ORAL 2 TIMES DAILY
Status: DISCONTINUED | OUTPATIENT
Start: 2020-03-10 | End: 2020-03-17 | Stop reason: HOSPADM

## 2020-03-10 RX ORDER — DEXAMETHASONE SODIUM PHOSPHATE 10 MG/ML
10 INJECTION, SOLUTION INTRAMUSCULAR; INTRAVENOUS ONCE
Status: COMPLETED | OUTPATIENT
Start: 2020-03-10 | End: 2020-03-10

## 2020-03-10 RX ORDER — CALCIUM CHLORIDE 100 MG/ML
INJECTION INTRAVENOUS; INTRAVENTRICULAR PRN
Status: DISCONTINUED | OUTPATIENT
Start: 2020-03-10 | End: 2020-03-10 | Stop reason: ALTCHOICE

## 2020-03-10 RX ORDER — GABAPENTIN 600 MG/1
600 TABLET ORAL ONCE
Status: COMPLETED | OUTPATIENT
Start: 2020-03-10 | End: 2020-03-10

## 2020-03-10 RX ORDER — ONDANSETRON 2 MG/ML
INJECTION INTRAMUSCULAR; INTRAVENOUS PRN
Status: DISCONTINUED | OUTPATIENT
Start: 2020-03-10 | End: 2020-03-10 | Stop reason: SDUPTHER

## 2020-03-10 RX ORDER — SODIUM CHLORIDE 0.9 % (FLUSH) 0.9 %
10 SYRINGE (ML) INJECTION EVERY 12 HOURS SCHEDULED
Status: DISCONTINUED | OUTPATIENT
Start: 2020-03-10 | End: 2020-03-17 | Stop reason: HOSPADM

## 2020-03-10 RX ORDER — ONDANSETRON 2 MG/ML
4 INJECTION INTRAMUSCULAR; INTRAVENOUS
Status: DISCONTINUED | OUTPATIENT
Start: 2020-03-10 | End: 2020-03-10 | Stop reason: HOSPADM

## 2020-03-10 RX ORDER — ACETAMINOPHEN 325 MG/1
650 TABLET ORAL EVERY 6 HOURS
Status: DISCONTINUED | OUTPATIENT
Start: 2020-03-10 | End: 2020-03-17 | Stop reason: HOSPADM

## 2020-03-10 RX ORDER — NEOSTIGMINE METHYLSULFATE 5 MG/5 ML
SYRINGE (ML) INTRAVENOUS PRN
Status: DISCONTINUED | OUTPATIENT
Start: 2020-03-10 | End: 2020-03-10 | Stop reason: SDUPTHER

## 2020-03-10 RX ORDER — PROPOFOL 10 MG/ML
INJECTION, EMULSION INTRAVENOUS PRN
Status: DISCONTINUED | OUTPATIENT
Start: 2020-03-10 | End: 2020-03-10 | Stop reason: SDUPTHER

## 2020-03-10 RX ORDER — MORPHINE SULFATE 2 MG/ML
2 INJECTION, SOLUTION INTRAMUSCULAR; INTRAVENOUS EVERY 5 MIN PRN
Status: DISCONTINUED | OUTPATIENT
Start: 2020-03-10 | End: 2020-03-10 | Stop reason: HOSPADM

## 2020-03-10 RX ORDER — LIDOCAINE HYDROCHLORIDE 10 MG/ML
INJECTION, SOLUTION INFILTRATION; PERINEURAL PRN
Status: DISCONTINUED | OUTPATIENT
Start: 2020-03-10 | End: 2020-03-10 | Stop reason: SDUPTHER

## 2020-03-10 RX ORDER — MIDAZOLAM HYDROCHLORIDE 1 MG/ML
INJECTION INTRAMUSCULAR; INTRAVENOUS PRN
Status: DISCONTINUED | OUTPATIENT
Start: 2020-03-10 | End: 2020-03-10 | Stop reason: SDUPTHER

## 2020-03-10 RX ORDER — FENTANYL CITRATE 50 UG/ML
25 INJECTION, SOLUTION INTRAMUSCULAR; INTRAVENOUS EVERY 5 MIN PRN
Status: DISCONTINUED | OUTPATIENT
Start: 2020-03-10 | End: 2020-03-10 | Stop reason: HOSPADM

## 2020-03-10 RX ADMIN — PHENYLEPHRINE HYDROCHLORIDE 200 MCG: 10 INJECTION INTRAVENOUS at 13:22

## 2020-03-10 RX ADMIN — ROCURONIUM BROMIDE 10 MG: 10 INJECTION, SOLUTION INTRAVENOUS at 14:12

## 2020-03-10 RX ADMIN — SODIUM CHLORIDE: 9 INJECTION, SOLUTION INTRAVENOUS at 15:56

## 2020-03-10 RX ADMIN — HYDROMORPHONE HYDROCHLORIDE 0.5 MG: 1 INJECTION, SOLUTION INTRAMUSCULAR; INTRAVENOUS; SUBCUTANEOUS at 16:44

## 2020-03-10 RX ADMIN — CEFAZOLIN 3 G: 1 INJECTION, POWDER, FOR SOLUTION INTRAMUSCULAR; INTRAVENOUS at 21:41

## 2020-03-10 RX ADMIN — ROCURONIUM BROMIDE 40 MG: 10 INJECTION, SOLUTION INTRAVENOUS at 13:08

## 2020-03-10 RX ADMIN — SODIUM CHLORIDE: 9 INJECTION, SOLUTION INTRAVENOUS at 11:29

## 2020-03-10 RX ADMIN — Medication 0.4 MG: at 15:01

## 2020-03-10 RX ADMIN — SENNOSIDES AND DOCUSATE SODIUM 1 TABLET: 8.6; 5 TABLET ORAL at 21:41

## 2020-03-10 RX ADMIN — ACETAMINOPHEN 1000 MG: 500 TABLET, FILM COATED ORAL at 10:30

## 2020-03-10 RX ADMIN — Medication 3 G: at 13:20

## 2020-03-10 RX ADMIN — GABAPENTIN 600 MG: 600 TABLET, FILM COATED ORAL at 10:30

## 2020-03-10 RX ADMIN — ACETAMINOPHEN 650 MG: 325 TABLET, FILM COATED ORAL at 21:41

## 2020-03-10 RX ADMIN — HYDROMORPHONE HYDROCHLORIDE 0.5 MG: 1 INJECTION, SOLUTION INTRAMUSCULAR; INTRAVENOUS; SUBCUTANEOUS at 15:26

## 2020-03-10 RX ADMIN — DEXAMETHASONE SODIUM PHOSPHATE 10 MG: 10 INJECTION, SOLUTION INTRAMUSCULAR; INTRAVENOUS at 11:28

## 2020-03-10 RX ADMIN — Medication 20 MG: at 13:14

## 2020-03-10 RX ADMIN — SODIUM CHLORIDE, POTASSIUM CHLORIDE, SODIUM LACTATE AND CALCIUM CHLORIDE: 600; 310; 30; 20 INJECTION, SOLUTION INTRAVENOUS at 17:34

## 2020-03-10 RX ADMIN — TRANEXAMIC ACID 1000 MG: 100 INJECTION, SOLUTION INTRAVENOUS at 13:18

## 2020-03-10 RX ADMIN — MIDAZOLAM 2 MG: 1 INJECTION INTRAMUSCULAR; INTRAVENOUS at 11:37

## 2020-03-10 RX ADMIN — OXYCODONE HYDROCHLORIDE 10 MG: 10 TABLET ORAL at 18:20

## 2020-03-10 RX ADMIN — ASPIRIN 325 MG: 325 TABLET, COATED ORAL at 21:41

## 2020-03-10 RX ADMIN — BUPIVACAINE 10 ML: 13.3 INJECTION, SUSPENSION, LIPOSOMAL INFILTRATION at 11:45

## 2020-03-10 RX ADMIN — BUPIVACAINE HYDROCHLORIDE 15 ML: 5 INJECTION, SOLUTION EPIDURAL; INTRACAUDAL at 11:45

## 2020-03-10 RX ADMIN — LIDOCAINE HYDROCHLORIDE 50 MG: 10 INJECTION, SOLUTION INFILTRATION; PERINEURAL at 13:08

## 2020-03-10 RX ADMIN — ACETAMINOPHEN 650 MG: 325 TABLET, FILM COATED ORAL at 16:44

## 2020-03-10 RX ADMIN — FENTANYL CITRATE 50 MCG: 50 INJECTION, SOLUTION INTRAMUSCULAR; INTRAVENOUS at 13:08

## 2020-03-10 RX ADMIN — Medication 2.5 MG: at 15:01

## 2020-03-10 RX ADMIN — FENTANYL CITRATE 50 MCG: 50 INJECTION, SOLUTION INTRAMUSCULAR; INTRAVENOUS at 13:56

## 2020-03-10 RX ADMIN — Medication 0.4 MG: at 13:16

## 2020-03-10 RX ADMIN — HYDROMORPHONE HYDROCHLORIDE 0.5 MG: 1 INJECTION, SOLUTION INTRAMUSCULAR; INTRAVENOUS; SUBCUTANEOUS at 15:40

## 2020-03-10 RX ADMIN — ONDANSETRON 4 MG: 2 INJECTION INTRAMUSCULAR; INTRAVENOUS at 14:59

## 2020-03-10 RX ADMIN — TRANEXAMIC ACID 1000 MG: 100 INJECTION, SOLUTION INTRAVENOUS at 14:48

## 2020-03-10 RX ADMIN — PROPOFOL 150 MG: 10 INJECTION, EMULSION INTRAVENOUS at 13:08

## 2020-03-10 RX ADMIN — HYDROMORPHONE HYDROCHLORIDE 0.5 MG: 1 INJECTION, SOLUTION INTRAMUSCULAR; INTRAVENOUS; SUBCUTANEOUS at 15:50

## 2020-03-10 ASSESSMENT — PULMONARY FUNCTION TESTS
PIF_VALUE: 21
PIF_VALUE: 22
PIF_VALUE: 21
PIF_VALUE: 21
PIF_VALUE: 24
PIF_VALUE: 22
PIF_VALUE: 21
PIF_VALUE: 23
PIF_VALUE: 20
PIF_VALUE: 21
PIF_VALUE: 25
PIF_VALUE: 22
PIF_VALUE: 22
PIF_VALUE: 21
PIF_VALUE: 24
PIF_VALUE: 22
PIF_VALUE: 21
PIF_VALUE: 21
PIF_VALUE: 20
PIF_VALUE: 21
PIF_VALUE: 24
PIF_VALUE: 17
PIF_VALUE: 17
PIF_VALUE: 20
PIF_VALUE: 22
PIF_VALUE: 20
PIF_VALUE: 21
PIF_VALUE: 20
PIF_VALUE: 21
PIF_VALUE: 21
PIF_VALUE: 20
PIF_VALUE: 0
PIF_VALUE: 17
PIF_VALUE: 21
PIF_VALUE: 22
PIF_VALUE: 20
PIF_VALUE: 22
PIF_VALUE: 20
PIF_VALUE: 17
PIF_VALUE: 21
PIF_VALUE: 20
PIF_VALUE: 27
PIF_VALUE: 23
PIF_VALUE: 21
PIF_VALUE: 25
PIF_VALUE: 21
PIF_VALUE: 24
PIF_VALUE: 17
PIF_VALUE: 21
PIF_VALUE: 21
PIF_VALUE: 16
PIF_VALUE: 20
PIF_VALUE: 24
PIF_VALUE: 21
PIF_VALUE: 21
PIF_VALUE: 18
PIF_VALUE: 23
PIF_VALUE: 22
PIF_VALUE: 23
PIF_VALUE: 20
PIF_VALUE: 20
PIF_VALUE: 21
PIF_VALUE: 22
PIF_VALUE: 22
PIF_VALUE: 21
PIF_VALUE: 20
PIF_VALUE: 22
PIF_VALUE: 16
PIF_VALUE: 21
PIF_VALUE: 8
PIF_VALUE: 17
PIF_VALUE: 16
PIF_VALUE: 27
PIF_VALUE: 18
PIF_VALUE: 26
PIF_VALUE: 24
PIF_VALUE: 20
PIF_VALUE: 20
PIF_VALUE: 21
PIF_VALUE: 16
PIF_VALUE: 21
PIF_VALUE: 21
PIF_VALUE: 25
PIF_VALUE: 16
PIF_VALUE: 19
PIF_VALUE: 25
PIF_VALUE: 21
PIF_VALUE: 22
PIF_VALUE: 0
PIF_VALUE: 21
PIF_VALUE: 21
PIF_VALUE: 2
PIF_VALUE: 0
PIF_VALUE: 20
PIF_VALUE: 17
PIF_VALUE: 21
PIF_VALUE: 21
PIF_VALUE: 23
PIF_VALUE: 20
PIF_VALUE: 16
PIF_VALUE: 21
PIF_VALUE: 21
PIF_VALUE: 23
PIF_VALUE: 24
PIF_VALUE: 23
PIF_VALUE: 16
PIF_VALUE: 20
PIF_VALUE: 20
PIF_VALUE: 21
PIF_VALUE: 26
PIF_VALUE: 26
PIF_VALUE: 20
PIF_VALUE: 21
PIF_VALUE: 23
PIF_VALUE: 21
PIF_VALUE: 1
PIF_VALUE: 0
PIF_VALUE: 23
PIF_VALUE: 21
PIF_VALUE: 3
PIF_VALUE: 20
PIF_VALUE: 17
PIF_VALUE: 21
PIF_VALUE: 20

## 2020-03-10 ASSESSMENT — PAIN DESCRIPTION - ORIENTATION
ORIENTATION: RIGHT

## 2020-03-10 ASSESSMENT — PAIN SCALES - GENERAL
PAINLEVEL_OUTOF10: 10
PAINLEVEL_OUTOF10: 7
PAINLEVEL_OUTOF10: 10
PAINLEVEL_OUTOF10: 9
PAINLEVEL_OUTOF10: 10
PAINLEVEL_OUTOF10: 9
PAINLEVEL_OUTOF10: 4
PAINLEVEL_OUTOF10: 10
PAINLEVEL_OUTOF10: 10

## 2020-03-10 ASSESSMENT — PAIN DESCRIPTION - PAIN TYPE
TYPE: SURGICAL PAIN
TYPE: SURGICAL PAIN
TYPE: ACUTE PAIN;SURGICAL PAIN
TYPE: SURGICAL PAIN
TYPE: SURGICAL PAIN

## 2020-03-10 ASSESSMENT — PAIN DESCRIPTION - LOCATION
LOCATION: KNEE

## 2020-03-10 ASSESSMENT — PAIN DESCRIPTION - DESCRIPTORS
DESCRIPTORS: ACHING
DESCRIPTORS: ACHING;CONSTANT
DESCRIPTORS: ACHING
DESCRIPTORS: ACHING
DESCRIPTORS: THROBBING
DESCRIPTORS: ACHING;DISCOMFORT

## 2020-03-10 ASSESSMENT — PAIN DESCRIPTION - ONSET
ONSET: ON-GOING
ONSET: ON-GOING

## 2020-03-10 ASSESSMENT — PAIN DESCRIPTION - FREQUENCY
FREQUENCY: CONTINUOUS
FREQUENCY: CONTINUOUS

## 2020-03-10 ASSESSMENT — PAIN - FUNCTIONAL ASSESSMENT
PAIN_FUNCTIONAL_ASSESSMENT: 0-10
PAIN_FUNCTIONAL_ASSESSMENT: PREVENTS OR INTERFERES SOME ACTIVE ACTIVITIES AND ADLS

## 2020-03-10 ASSESSMENT — PAIN DESCRIPTION - PROGRESSION
CLINICAL_PROGRESSION: NOT CHANGED
CLINICAL_PROGRESSION: NOT CHANGED

## 2020-03-10 ASSESSMENT — ENCOUNTER SYMPTOMS: STRIDOR: 0

## 2020-03-10 NOTE — PROGRESS NOTES
History  Social/Functional History  Lives With: Alone  Type of Home: House([facility])  Home Layout: One level  Home Access: Level entry  Bathroom Shower/Tub: Tub/Shower unit  Bathroom Toilet: Handicap height  Bathroom Equipment: Tub transfer bench, Grab bars in shower, Hand-held shower  Bathroom Accessibility: Accessible, Walker accessible  Home Equipment: Rolling walker, Cane  ADL Assistance: Independent  Homemaking Assistance: Independent  Homemaking Responsibilities: Yes(uses scooter around grocery store)  [address]: Independent(used RW)  Transfer Assistance: Independent  Active : Yes  Mode of Transportation: Car  Occupation: Retired  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 Pt will not have assist at home. Cognition        Objective          AROM RLE (degrees)  RLE AROM: WFL  RLE General AROM: Knee AROM: 0-85  AROM LLE (degrees)  LLE AROM : WFL  AROM RUE (degrees)  RUE AROM : WFL  AROM LUE (degrees)  LUE AROM : WFL  Strength RLE  Strength RLE: WFL  Comment: Grossly 4-/5  Strength LLE  Strength LLE: WFL  Comment: Grossly 4-/5  Strength RUE  Comment: See OT  Strength LUE  Comment: See OT     Sensation  Overall Sensation Status: WFL(pt denies)  Bed mobility  Rolling to Right: Stand by assistance  Supine to Sit: Stand by assistance  Sit to Supine: Moderate assistance;2 Person assistance(at B LE)  Scooting: Stand by assistance  Comment: HOB elevated, increased time. Increased assist back to bed at [facility] Ice packs and EPC cuffs on.   Transfers  Sit to Stand: Minimal Assistance;2 Person Assistance  Stand to sit: Minimal Assistance;2 Person Assistance  Bed to Chair: Minimal assistance;2 Person Assistance(to [facility])  Stand Pivot Transfers: Minimal Assistance;2 Person Assistance  Comment: Cues for safe technique, pt denies lightheadedness upon standing, no knee buckling. Education on Full WBAT RLE. Ambulation  Ambulation?: Yes  WB Status: Full WBAT R LE  Ambulation 1  Surface: level tile  Device: Rolling Walker  Assistance: Minimal assistance;2 Person assistance  Quality of Gait: slow gabriela, small steps, no knee buckling or LOB, pt steady  Gait Deviations: Slow Gabriela;Decreased step length  Distance: 5' x2, including 3' lateral side steps along bedside  Comments: min x2 for safety. Pt became nauseous after amb/transfer and dry heaving into basin. RN Ale in room for all mobility. Stairs/Curb  Stairs?: No     Balance  Posture: Good  Sitting - Static: Good  Sitting - Dynamic: Good  Standing - Static: Fair;+(with RW, 2 assist)  Standing - Dynamic: Fair(with RW, 2 assist)  Comments: Fall risk        Plan   Plan  Times per week: BID  Specific instructions for Next Treatment: progress gait, up to chair, increase R knee AROM  Current Treatment Recommendations: Strengthening, ROM, Balance Training, Functional Mobility Training, Transfer Training, Endurance Training, Gait Training, Equipment Evaluation, Education, & procurement, Patient/Caregiver Education & Training, Safety Education & Training, Positioning  Safety Devices  Type of devices: All fall risk precautions in place, Call light within reach, Gait belt, Patient at risk for falls, Nurse notified, Left in bed(RN ale in room)    G-Code       OutComes Score                                                  AM-PAC Score     AM-PAC Inpatient Mobility without Stair Climbing Raw Score : 9 (03/10/20 1642)  AM-PAC Inpatient without Stair Climbing T-Scale Score : 32.44 (03/10/20 1642)  Mobility Inpatient CMS 0-100% Score: 76.07 (03/10/20 1642)  Mobility Inpatient without Stair CMS G-Code Modifier : CL (03/10/20 1642)       Goals  Short term goals  Time Frame for Short term goals: 3-4 days  Short term goal 1: Pt to demonstrate supervision bed mobility (rolling, supine to sit).   Short term goal 2: Pt to demonstrate min x1 sit to

## 2020-03-10 NOTE — ANESTHESIA PROCEDURE NOTES
Peripheral Block    Patient location during procedure: pre-op  End time: 3/10/2020 11:30 AM  Staffing  Anesthesiologist: Mian Cruz MD  Performed: anesthesiologist   Preanesthetic Checklist  Completed: patient identified, site marked, surgical consent, pre-op evaluation, timeout performed, IV checked, risks and benefits discussed, monitors and equipment checked, anesthesia consent given, oxygen available and patient being monitored  Peripheral Block  Patient position: supine  Prep: ChloraPrep  Patient monitoring: cardiac monitor, continuous pulse ox, frequent blood pressure checks and IV access  Block type: Femoral  Laterality: right  Injection technique: single-shot  Procedures: ultrasound guided  Local infiltration: lidocaine  Infiltration strength: 1 %  Dose: 2 mL  Adductor canal  Provider prep: mask and sterile gloves  Local infiltration: lidocaine  Needle  Needle type: short-bevel   Needle gauge: 20 G  Needle length: 8 cm  Needle localization: ultrasound guidance  Needle insertion depth: 5.5 cm  Assessment  Injection assessment: negative aspiration for heme, no paresthesia on injection and local visualized surrounding nerve on ultrasound  Paresthesia pain: none  Slow fractionated injection: yes  Hemodynamics: stable  Medications Administered  Bupivacaine liposome (EXPAREL) injection 1.3%, 10 mL  bupivacaine (MARCAINE) PF injection 0.5%, 15 mL  Reason for block: post-op pain management and at surgeon's request

## 2020-03-10 NOTE — ANESTHESIA POSTPROCEDURE EVALUATION
POST- ANESTHESIA EVALUATION       Pt Name: Debbie Guy  MRN: [id number]  YOB: 1955  Date of evaluation: 3/10/2020  Time:  4:28 PM      BP (!) 96/52   Pulse 82   Temp 97.6 °F (36.4 °C)   Resp 16   Ht 5' 5\" (1.651 m)   Wt (!) 320 lb (145.2 kg)   SpO2 95%   BMI 53.25 kg/m²      Consciousness Level  Awake  Cardiopulmonary Status  Stable  Pain Adequately Treated YES  Nausea / Vomiting  NO  Adequate Hydration  YES  Anesthesia Related Complications NONE      Electronically signed by Madyson Billings MD on 3/10/2020 at 4:28 PM       Department of Anesthesiology  Postprocedure Note    Patient: Debbie Guy  MRN: [id number]  YOB: 1955  Date of evaluation: 3/10/2020  Time:  4:28 PM     Procedure Summary     Date:  03/10/20 Room / Location:  [address] [place] [address] / [facility]: [facility]    Anesthesia Start:  9752 Anesthesia Stop:  1518    Procedure:  KNEE COMPLEX TOTAL ARTHROPLASTY (Right Knee) Diagnosis:  (DJD RIGHT KNEE)    Surgeon:  Elena Singh MD Responsible Provider:  Aurea Rose MD    Anesthesia Type:  general, regional ASA Status:  3          Anesthesia Type: general, regional    Lori Phase I: Lori Score: 8    Lori Phase II:      Last vitals: Reviewed and per EMR flowsheets.        Anesthesia Post Evaluation

## 2020-03-10 NOTE — OP NOTE
Preoperative diagnosis: Severe destructive degenerative joint disease right knee  Postoperative diagnosis: Same  Procedure: Complex total knee arthroplasty utilizing a Biomet OSS prosthesis size 3 cm distal femoral resection with a 20 x 150 stem with a 75 nonmodular tibial component with a 16 mm polyethylene hinged prosthesis 34 thin patella  Surgeon: Nazario Mckeon  Assistant: Temo Officer  Anesthesia: General  Zometa blood loss: Minimal    Jia Savage is a 25-year-old female who has presented to me prior with severe degenerative joint disease of both knees with a destructive arthritis with erosion of the proximal tibia with severe varus deformities. She is already had a left total knee arthroplasty which was at OSS prosthesis and doing very well with this now presents for her right consent was obtained good comprehension of all risk and benefits    Patient was given 3 g Ancef in holding area as well as an adductor canal block she was then taken operative suite where general anesthesia . The tourniquet applied to right thigh the right lower extremities were prepped and draped in usual fashion. Timeout was called to verify laterality. Leg was exsanguinated turned inflated to 350 mmHg. Straight midline incision centered over the patella takedown the subcu where a medial parapatellar arthrotomy was carried out. Severe destructive arthrosis was seen humongous osteophytes in the suprapatellar area. Were able to remove these. After synovectomy to patella was placed on that side and calibrated for thickness and a freehand resection of 34 patella was applied and found to have original thickness with a 34 thin. Knee was then flexed and revealed severe arthrosis. Intramedullary man was inserted in the femur and a distal resection at 30 mm and 5 degrees of valgus was made. The sizing guide was then placed for 3 cm resection proper external rotation was verified with the trans-epicondylar axis.   This was then pinned in place and the anterior and posterior resections were made and the fragments removed. The hand reaming of the canal was performed up to a size 21. The trial 3 cm segment with a 20 x 150 stem was inserted and good stability was noted. This was removed the proximal tibia was exposed with proper retraction removal remaining soft tissue elements. Extramedullary tibial cutting guide was then positioned in reference the tubercle and the joint remained for cut at 90 degrees long axis but not into the bottom portion of the tibial deformity medially. This was then sized and 75 was positioned with alignment man down the middle third of the tubercle. This was pinned placed the tower guide applied this to the proximal and distal reamers were applied and then the guide removed and the wings placed this was then trialed with a nonmodular 75 tibial trial and then also the femoral components. We initially had a 12 cut and polyethylene inserted an additional several millimeters were all the proximal tip was removed to give her some freedom of movement one way or the other in regards to sizing. This was then compressed later on with a 16 satisfied with this as well as patellar tracking and the patellar height and stability of the all the trial components removed. Knee was irrigated and dried while the cement prepared. Cement 3 batches with antibiotic impregnated cement. The components were assembled. Cement was then placed on the both the tibial femoral components including the proximal portions of the stumps and these were impacted i and excess cement was removed. This was compressed with a 16 poly-while the patella was applied a clamp. The knee was then injected with 100 cc of total joint solution. A Irrisept was carried out and the knee was investigated for any further soft tissue or cement debris.   Upon re-trialing elected go with a permanent 16 and all the bushings for the femur and the tibia were placed as well as the

## 2020-03-10 NOTE — INTERVAL H&P NOTE
H&P Update    Patient's History and Physical from February 25, 2020 was reviewed. Patient examined. There has been no change. Here today for right total knee arthroplasty. Pt had left knee replacement in Sept. 2019. NPO since before midnight. Did not take any medications this am. No blood thinners for last 7 days. Denies any current chest pain/pressure, palpitations, SOB, recent URI, nausea, vomiting, diarrhea, constipation, fever or chills. Vitals reviewed. Pt normotensive and afebrile.      Electronically signed by NIALL Rodriguez CNP on 3/10/20 at 9:51 AM EDT

## 2020-03-11 PROCEDURE — 6370000000 HC RX 637 (ALT 250 FOR IP): Performed by: ORTHOPAEDIC SURGERY

## 2020-03-11 PROCEDURE — 6370000000 HC RX 637 (ALT 250 FOR IP): Performed by: FAMILY MEDICINE

## 2020-03-11 PROCEDURE — 97116 GAIT TRAINING THERAPY: CPT

## 2020-03-11 PROCEDURE — 97110 THERAPEUTIC EXERCISES: CPT

## 2020-03-11 PROCEDURE — 1200000000 HC SEMI PRIVATE

## 2020-03-11 PROCEDURE — 6360000002 HC RX W HCPCS: Performed by: FAMILY MEDICINE

## 2020-03-11 PROCEDURE — 99024 POSTOP FOLLOW-UP VISIT: CPT | Performed by: ORTHOPAEDIC SURGERY

## 2020-03-11 PROCEDURE — 2580000003 HC RX 258: Performed by: ORTHOPAEDIC SURGERY

## 2020-03-11 PROCEDURE — 97530 THERAPEUTIC ACTIVITIES: CPT

## 2020-03-11 PROCEDURE — 94760 N-INVAS EAR/PLS OXIMETRY 1: CPT

## 2020-03-11 PROCEDURE — 97166 OT EVAL MOD COMPLEX 45 MIN: CPT

## 2020-03-11 PROCEDURE — 6360000002 HC RX W HCPCS: Performed by: ORTHOPAEDIC SURGERY

## 2020-03-11 PROCEDURE — 94640 AIRWAY INHALATION TREATMENT: CPT

## 2020-03-11 PROCEDURE — 97535 SELF CARE MNGMENT TRAINING: CPT

## 2020-03-11 RX ORDER — IPRATROPIUM BROMIDE AND ALBUTEROL SULFATE 2.5; .5 MG/3ML; MG/3ML
1 SOLUTION RESPIRATORY (INHALATION)
Status: DISCONTINUED | OUTPATIENT
Start: 2020-03-11 | End: 2020-03-12

## 2020-03-11 RX ORDER — OXYCODONE HYDROCHLORIDE 10 MG/1
10 TABLET ORAL EVERY 4 HOURS PRN
Qty: 40 TABLET | Refills: 0 | Status: SHIPPED | OUTPATIENT
Start: 2020-03-11 | End: 2020-03-18

## 2020-03-11 RX ORDER — ONDANSETRON 2 MG/ML
4 INJECTION INTRAMUSCULAR; INTRAVENOUS EVERY 6 HOURS PRN
Status: DISCONTINUED | OUTPATIENT
Start: 2020-03-11 | End: 2020-03-17 | Stop reason: HOSPADM

## 2020-03-11 RX ADMIN — ONDANSETRON 4 MG: 2 INJECTION INTRAMUSCULAR; INTRAVENOUS at 14:17

## 2020-03-11 RX ADMIN — ASPIRIN 325 MG: 325 TABLET, COATED ORAL at 22:26

## 2020-03-11 RX ADMIN — OXYCODONE HYDROCHLORIDE 10 MG: 10 TABLET ORAL at 05:33

## 2020-03-11 RX ADMIN — ACETAMINOPHEN 650 MG: 325 TABLET, FILM COATED ORAL at 05:33

## 2020-03-11 RX ADMIN — SENNOSIDES AND DOCUSATE SODIUM 1 TABLET: 8.6; 5 TABLET ORAL at 09:04

## 2020-03-11 RX ADMIN — Medication 10 ML: at 22:27

## 2020-03-11 RX ADMIN — MAGNESIUM HYDROXIDE 30 ML: 400 SUSPENSION ORAL at 22:26

## 2020-03-11 RX ADMIN — ACETAMINOPHEN 650 MG: 325 TABLET, FILM COATED ORAL at 09:05

## 2020-03-11 RX ADMIN — MAGNESIUM HYDROXIDE 30 ML: 400 SUSPENSION ORAL at 14:17

## 2020-03-11 RX ADMIN — OXYCODONE HYDROCHLORIDE 10 MG: 10 TABLET ORAL at 16:18

## 2020-03-11 RX ADMIN — ASPIRIN 325 MG: 325 TABLET, COATED ORAL at 09:04

## 2020-03-11 RX ADMIN — ACETAMINOPHEN 650 MG: 325 TABLET, FILM COATED ORAL at 22:26

## 2020-03-11 RX ADMIN — OXYCODONE HYDROCHLORIDE 10 MG: 10 TABLET ORAL at 22:33

## 2020-03-11 RX ADMIN — SENNOSIDES AND DOCUSATE SODIUM 1 TABLET: 8.6; 5 TABLET ORAL at 22:26

## 2020-03-11 RX ADMIN — CEFAZOLIN 3 G: 1 INJECTION, POWDER, FOR SOLUTION INTRAMUSCULAR; INTRAVENOUS at 05:33

## 2020-03-11 RX ADMIN — Medication 10 ML: at 08:21

## 2020-03-11 RX ADMIN — ACETAMINOPHEN 650 MG: 325 TABLET, FILM COATED ORAL at 16:18

## 2020-03-11 RX ADMIN — IPRATROPIUM BROMIDE AND ALBUTEROL SULFATE 1 AMPULE: .5; 3 SOLUTION RESPIRATORY (INHALATION) at 20:33

## 2020-03-11 ASSESSMENT — PAIN DESCRIPTION - LOCATION
LOCATION: KNEE

## 2020-03-11 ASSESSMENT — PAIN DESCRIPTION - ORIENTATION
ORIENTATION: LEFT
ORIENTATION: RIGHT
ORIENTATION: RIGHT
ORIENTATION: LEFT
ORIENTATION: RIGHT
ORIENTATION: RIGHT

## 2020-03-11 ASSESSMENT — PAIN DESCRIPTION - PROGRESSION
CLINICAL_PROGRESSION: NOT CHANGED
CLINICAL_PROGRESSION: NOT CHANGED

## 2020-03-11 ASSESSMENT — PAIN SCALES - GENERAL
PAINLEVEL_OUTOF10: 7
PAINLEVEL_OUTOF10: 5
PAINLEVEL_OUTOF10: 8
PAINLEVEL_OUTOF10: 8
PAINLEVEL_OUTOF10: 4
PAINLEVEL_OUTOF10: 5
PAINLEVEL_OUTOF10: 4

## 2020-03-11 ASSESSMENT — PAIN DESCRIPTION - FREQUENCY
FREQUENCY: INTERMITTENT
FREQUENCY: CONTINUOUS

## 2020-03-11 ASSESSMENT — PAIN DESCRIPTION - PAIN TYPE
TYPE: ACUTE PAIN;SURGICAL PAIN
TYPE: SURGICAL PAIN;ACUTE PAIN
TYPE: SURGICAL PAIN
TYPE: ACUTE PAIN;SURGICAL PAIN
TYPE: ACUTE PAIN
TYPE: ACUTE PAIN

## 2020-03-11 ASSESSMENT — PAIN - FUNCTIONAL ASSESSMENT: PAIN_FUNCTIONAL_ASSESSMENT: PREVENTS OR INTERFERES SOME ACTIVE ACTIVITIES AND ADLS

## 2020-03-11 ASSESSMENT — PAIN DESCRIPTION - DESCRIPTORS
DESCRIPTORS: SHARP
DESCRIPTORS: ACHING
DESCRIPTORS: ACHING

## 2020-03-11 ASSESSMENT — PAIN DESCRIPTION - ONSET: ONSET: ON-GOING

## 2020-03-11 NOTE — PROGRESS NOTES
min c RW  Activity: func mobility  Comment: pt bello safe tech, no LOB noted  Functional Mobility  Functional - Mobility Device: Rolling Walker  Activity: To/from bathroom(bed>toilet, toilet>hallway>bed)  Assist Level: Contact guard assistance  Functional Mobility Comments: pt demo safe tech, no LOB noted   ADL  Toileting: Contact guard assistance(utilizes 1 UE support on RW during marty care)     Transfers  Sit to stand: Contact guard assistance  Stand to sit: Contact guard assistance  Transfer Comments: pt bello BAUM hand placement  Toilet Transfers  Toilet - Technique: (c RW)  Equipment Used: Standard toilet(c rails)  Toilet Transfer: Contact guard assistance  Toilet Transfers Comments: pt utilizes rails properly          Assessment  Performance deficits / Impairments: Decreased functional mobility ; Decreased safe awareness;Decreased ADL status; Decreased endurance;Decreased strength  Prognosis: Good  Discharge Recommendations: Patient would benefit from continued therapy after discharge  Activity Tolerance: Patient Tolerated treatment well  Safety Devices in place: Yes  Type of devices: Patient at risk for falls;Gait belt;Call light within reach; Left in bed             Patient Education: OT POC, HOME SAFETY/FALL PREVENTION, cryo cuff mgt and application, breathing tech for pain mgt  Learner:patient  Method: demonstration, explanation and handout       Outcome: acknowledged understanding and demonstrated understanding     Plan  Safety Devices  Safety Devices in place: Yes  Type of devices: Patient at risk for falls, Gait belt, Call light within reach, Left in bed  Plan  Times per week: 2-4 days  Times per day: Twice a day  Current Treatment Recommendations: Strengthening, Endurance Training, Patient/Caregiver Education & Training, Safety Education & Training, Functional Mobility Training, Self-Care / ADL, Pain Management      Goals  Short term goals  Time Frame for Short term goals: 1-3 Days   Short term goal 1:

## 2020-03-11 NOTE — PROGRESS NOTES
1:  Continue Physical Therapy  2:  Continue Deep venous thrombosis prophylaxis  3:  Continue Pain Control  4:  Discharge plans SNF       Electronically signed by Julio C Arzate MD on 3/11/2020 at 12:44 PM

## 2020-03-11 NOTE — PLAN OF CARE
Problem: Pain:  Goal: Pain level will decrease  Description: Pain level will decrease  3/11/2020 1824 by Elmer Meade RN  Outcome: Ongoing  Note: Pain is controlled with current regimen. See MAR.  3/11/2020 0440 by Catia Alba RN  Outcome: Ongoing  Goal: Control of acute pain  Description: Control of acute pain  3/11/2020 1824 by Elmer Meade RN  Outcome: Ongoing  3/11/2020 0440 by Catia Alba RN  Outcome: Ongoing  Goal: Control of chronic pain  Description: Control of chronic pain  3/11/2020 1824 by Elmer Meade RN  Outcome: Ongoing  3/11/2020 0440 by Catia Alba RN  Outcome: Ongoing     Problem: Musculor/Skeletal Functional Status  Goal: Highest potential functional level  3/11/2020 1824 by Elmer Meade RN  Outcome: Ongoing  3/11/2020 0440 by Catia Alba RN  Outcome: Ongoing  Goal: Absence of falls  3/11/2020 1824 by Elmer Meade RN  Outcome: Ongoing  3/11/2020 0440 by Catia Alba RN  Outcome: Ongoing     Problem: Falls - Risk of:  Goal: Will remain free from falls  Description: Will remain free from falls  3/11/2020 1824 by Elmer Meade RN  Outcome: Ongoing  Note: Patient remains free of falls and injuries throughout shift. Bed remains in the lowest position, wheels locked, call light and bedside table are within reach.   3/11/2020 0440 by Catia Alba RN  Outcome: Ongoing  Goal: Absence of physical injury  Description: Absence of physical injury  3/11/2020 1824 by Elmer Meade RN  Outcome: Ongoing  3/11/2020 0440 by Catia Alba RN  Outcome: Ongoing

## 2020-03-11 NOTE — PLAN OF CARE
Problem: Musculor/Skeletal Functional Status  Goal: Absence of falls  3/11/2020 1929 by Juhi Calles RN  Outcome: Met This Shift  3/11/2020 1824 by Keith Dove RN  Outcome: Ongoing     Problem: Falls - Risk of:  Goal: Will remain free from falls  Description: Will remain free from falls  3/11/2020 1929 by Juhi Calles RN  Outcome: Met This Shift  Note: Pt remains free of falls this shift. Bed locked and low, belongings and adaptive devices within reach. Will continue to monitor. Electronically signed by Juhi Calles RN on 3/11/2020 at 7:33 PM    3/11/2020 1824 by Keith Dove RN  Outcome: Ongoing  Note: Patient remains free of falls and injuries throughout shift. Bed remains in the lowest position, wheels locked, call light and bedside table are within reach. Goal: Absence of physical injury  Description: Absence of physical injury  3/11/2020 1929 by Juhi Calles RN  Outcome: Met This Shift  3/11/2020 1824 by Keith Dove RN  Outcome: Ongoing     Problem: Pain:  Goal: Pain level will decrease  Description: Pain level will decrease  3/11/2020 1929 by Juhi Calles RN  Outcome: Ongoing  Note: Pt requested minimal pain medicine this shift. Pain is still not at a desirable level, will continue to monitor. Electronically signed by Juhi Calles RN on 3/11/2020 at 7:30 PM    3/11/2020 1824 by Keith Dove RN  Outcome: Ongoing  Note: Pain is controlled with current regimen. See MAR.   Goal: Control of acute pain  Description: Control of acute pain  3/11/2020 1929 by Juhi Calles RN  Outcome: Ongoing  3/11/2020 1824 by Keith Dove RN  Outcome: Ongoing  Goal: Control of chronic pain  Description: Control of chronic pain  3/11/2020 1929 by Juhi Calles RN  Outcome: Ongoing  3/11/2020 1824 by Keith Dove RN  Outcome: Ongoing     Problem: Musculor/Skeletal Functional Status  Goal: Highest potential functional level  3/11/2020 1929 by Juhi Calles RN  Outcome:

## 2020-03-11 NOTE — PROGRESS NOTES
Patient having some nausea with dry heaving. No nausea medications ordered. Message left Dr. Alan Espinoza office (on call for Dr. Damien Cranker) to call writer back.

## 2020-03-11 NOTE — PROGRESS NOTES
Ace wrap and fluff removed from Pt's knee. Small amount of drainage visible on aquacel. Pt measured for compression stockings. Cryo cuff reapplied.

## 2020-03-11 NOTE — PROGRESS NOTES
[id number]    Occupational Therapy Evaluation  Date: 3/11/20  Patient Name: Alexia Goodwin       Room: 2217/7821-86  MRN: [id number]  Account: [de-identified]   : 1955  (66 y.o.) Gender: female     Discharge Recommendations:  Further Occupational  Therapy is recommended upon facility discharge. Referring Practitioner: Dr Romeo Seay   Diagnosis: s/p Right Total Knee Replacement   Additional Pertinent Hx: Past Left Total KNee Replacement Sep-2019    Treatment Diagnosis: Imparied Ability to care for self related to R TKR   Past Medical History:  has a past medical history of Anemia, Arthritis, Colostomy in place [facility]), Diverticulitis, DJD (degenerative joint disease) of knee, H/O hand fracture, Hypertension, Morbid obesity with BMI of 50.0-59.9, adult ([address] Vitamin D deficiency, Wears glasses, and Wears partial dentures. Past Surgical History:   has a past surgical history that includes  section; Tonsillectomy; colostomy; Colon surgery; Carpal tunnel release (Right, 2014); Carpal tunnel release (Left, 09/10/2014); Gastric bypass surgery (); Cholecystectomy; Colonoscopy; Hand surgery (Left, 2019); Total knee arthroplasty (Left, 2019); joint replacement (Left, 2019); and Total knee arthroplasty (Right, 3/10/2020). Restrictions  Restrictions/Precautions: Weight Bearing, Surgical Protocols, Fall Risk, Contact Precautions(Right TKR )  Implants present? : Metal implants(B TKR )  Other position/activity restrictions:  Activity orders per Dr Romeo Seay  Right Lower Extremity Weight Bearing: Weight Bearing As Tolerated  Required Braces or Orthoses?: No     Vitals  Temp: 97.6 °F (36.4 °C)  Pulse: 69  Resp: 16  BP: (!) 101/53  Height: 5' 5\" (165.1 cm)  Weight: (!) 320 lb (145.2 kg)  BMI (Calculated): 53.4  Oxygen Therapy  SpO2: 98 %  Pulse Oximeter Device Mode: Continuous  Pulse Oximeter Device Location: Right, Hand  O2 Device: None (Room air)  O2 Flow Rate (L/min): 2 L/min  Level of Consciousness: Alert    Subjective  Subjective: Alert and cooperative   Comments: Fatigued - reports feeling sleepy all the time after surgery   Overall Orientation Status: Within Functional Limits  Vision  Vision: Impaired  Vision Exceptions: Wears glasses at all times  Hearing  Hearing: Within functional limits  Social/Functional History  Lives With: Alone  Type of Home: [address] [place] [address]  Home Layout: One level  Home Access: Level entry  Bathroom Shower/Tub: Tub/Shower unit, Curtain  Bathroom Toilet: Handicap height  Bathroom Equipment: Tub transfer bench, Grab bars in shower, Hand-held shower  Bathroom Accessibility: Accessible, Walker accessible  Home Equipment: Rolling walker, Cane  ADL Assistance: Independent  Homemaking Assistance: Independent  Homemaking Responsibilities: Yes  Ambulation Assistance: Independent  Transfer Assistance: Independent  Active : Yes  Mode of Transportation: Car  Occupation: Retired  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 Pain Assessment  Pain Assessment: 0-10  Pain Level: 4  Pain Type: Surgical pain, Acute pain  Pain Location: Knee  Pain Orientation: Right  Pain Descriptors: Aching  Non-Pharmaceutical Pain Intervention(s): Cold applied    Objective      Cognition  Overall Cognitive Status: WNL       ADL  Feeding: Independent  Grooming: Setup, Stand by assistance  UE Bathing: Setup, Contact guard assistance  LE Bathing:  Moderate assistance, Maximum assistance  UE Dressing: Minimal assistance  LE Dressing: Maximum assistance, Dependent/Total  Toileting: Dependent/Total(2 person assist for transfer to [facility];  REports she is able to perfrom colostomy care  )    UE Function  Hand Dominance  Hand Dominance: Right        LUE Strength  L Hand General: 4/5  LUE Strength Comment: Impaired Shoulder function - only slight active motion at shoulder      LUE Tone: Normotonic     LUE AROM (degrees)  LUE General AROM: Impaired Shoulder function in all directions      Left Hand AROM (degrees)  Left Hand AROM: WFL  RUE Strength  R Hand General: 4/5  RUE Strength Comment: Impaired shoulder function - unable to actively lift arm from shoulder       RUE Tone: Normotonic     RUE AROM (degrees)  RUE General AROM: Impaired Shoulder fucntion in all directions, less P ROM than Left UE      Right Hand AROM (degrees)  Right Hand AROM: WFL    Fine Motor Skills  Coordination  Movements Are Fluid And Coordinated: Yes  Coordination and Movement description: Gross motor impairments, Right UE, Left UE   Comment: Hands work well but has impaired shoulder function in both UE                Quality of Movement Other  Comment: Hands work well but has impaired shoulder function in both UE        Mobility          Balance  Sitting Balance: Modified independent                      Assessment  Performance deficits / Impairments: Decreased functional mobility , Decreased safe awareness, Decreased ADL status, Decreased endurance, Decreased strength  Treatment Diagnosis: Imparied Ability to care for self related to R TKR   Prognosis: Good  Decision Making: Medium Complexity  History:  Moderate  Chart review   Exam: 6 areas of altered performance   REQUIRES OT FOLLOW UP: Yes  Discharge Recommendations: Patient would benefit from continued therapy after discharge  Activity Tolerance: Patient limited by pain, Patient limited by fatigue         Functional Outcome Measures  AM-PAC Daily Activity Inpatient   How much help for putting on and taking off regular lower body clothing?: Total  How much help for Bathing?: A Lot  How much help for Toileting?: Total  How much help for putting on and taking off regular upper body clothing?: A Little  How much help for taking care of personal grooming?: A Little  How much help for eating meals?: None  AM-PAC Inpatient Daily Activity Raw Score: 14  AM-PAC Inpatient ADL T-Scale Score : 33.39  ADL Inpatient CMS 0-100% Score: 59.67  ADL Inpatient CMS G-Code Modifier : CK       Goals  Patient Goals   Patient goals : REgain abiltiy to eventually walk without a device (lives alone and needs to be mobility and safe to go home )   Short term goals  Time Frame for Short term goals: 1-3 Days   Short term goal 1: Tolerate 10-25 minutes of general care tasks / mobility without increased fatigue / pain   Short term goal 2: D/V understanding of Modified care strategies with applicaiton to care needs   Short term goal 3: Participate in care using safe techniques for mobility and self care tasks   Short term goal 4: D/V understanding of Total Knee Program and Interventions for safe care     Plan  Safety Devices  Safety Devices in place: Yes  Type of devices: Left in chair, Patient at risk for falls, Gait belt, Call light within reach     Plan  Times per week: 2-4 days  Times per day: Twice a day  Current Treatment Recommendations: Strengthening, Endurance Training, Patient/Caregiver Education & Training, Safety Education & Training, Functional Mobility Training, Self-Care / ADL, Pain Management  OT Education  OT Education: OT Role, Plan of Care, Precautions, ADL Adaptive Strategies, Equipment       OT Individual Minutes  Time In: 1010  Time Out: 1100  Minutes: 50    Electronically signed by Sarah Chaney OT on 3/11/20 at 1:29 PM EDT

## 2020-03-11 NOTE — CARE COORDINATION
Joint Replacement Discharge Planning Note:    Admission Date:  3/10/2020 Geraldo Nieves is a 75 y.o.  female    Admitted for : Primary osteoarthritis of right knee [M17.11]    Met with:  Patient    PCP:  Jewell Barroso MD              Insurance:  Medicare    Current Residence/ Living Arrangements:  independently at home             Current Services PTA:  No    Is patient agreeable to [facility]: Yes    Is patient agreeable to outpatient physical therapy:  No    Freedom of choice provided: Yes         [facility] Agency/Outpatient Therapy chosen:  [address] needed: Yes- pt states she is planning to go to [facility]    Current home DME:  walker    Pharmacy:  [facility]    Does Patient want to use MEDS to BEDS?([facility] only) No    Transportation Provider:  Family                       Discharge Plan:   Patient intends to discharge to [address] [place] [address]. Left message for Emili Villa, regarding this. Patient does not need a wheeled walker. Anticipated discharge date 3/12/2020      Readmission Risk              Risk of Unplanned Readmission:        0           Electronically signed by: David Boudreaux RN on 3/11/2020 at 9:26 AM    Left message for Bridgett Sidhu from [place] to follow.     Electronically signed by David Boudreaux RN on 3/11/2020 at 12:20 PM

## 2020-03-11 NOTE — PLAN OF CARE
Problem: Pain:  Goal: Pain level will decrease  Description: Pain level will decrease  3/11/2020 0440 by Cristbóal Herrera RN  Outcome: Ongoing    Problem: Musculor/Skeletal Functional Status  Goal: Highest potential functional level  3/11/2020 0440 by Cristóbal Herrera RN  Outcome: Ongoing     Problem: Musculor/Skeletal Functional Status  Goal: Absence of falls  3/11/2020 0440 by Cristóbal Herrera RN  Outcome: Ongoing     Problem: Falls - Risk of:  Goal: Will remain free from falls  Description: Will remain free from falls  3/11/2020 0440 by Cristóbal Herrera RN  Outcome: Ongoing  3/10/2020 1923 by Zayda Dsouza RN  Outcome: Ongoing

## 2020-03-11 NOTE — CONSULTS
tablet, Take 40 mg by mouth daily  spironolactone (ALDACTONE) 50 MG tablet, Take 50 mg by mouth daily  omeprazole (PRILOSEC) 20 MG delayed release capsule, Take 20 mg by mouth daily  ferrous sulfate 325 (65 FE) MG tablet, Take 325 mg by mouth daily (with breakfast)  Cholecalciferol (VITAMIN D3) 24770 units CAPS, Take 1 capsule by mouth once a week    Allergies:  Shellfish-derived products    Immunizations:   Immunization History   Administered Date(s) Administered    Influenza, Quadv, IM, PF (6 mo and older Fluzone, Flulaval, Fluarix, and 3 yrs and older Afluria) 09/20/2019         Social History: Lives alone, no smoking, no ETOH, h/o exposure to second hand smoke      Family History:       Problem Relation Age of Onset    Diabetes Mother     Heart Disease Father     Diabetes Father     Heart Failure Father        REVIEW OF SYSTEMS:  Constitutional:  negative for fevers, chills, sweats, fatigue, weight loss  HEENT:  negative for vision, hearing changes, runny nose, throat pain  Respiratory:  negative for shortness of breath, +cough,+ congestion,+ wheezing  Cardiovascular:  negative for chest pain, palpitations  Gastrointestinal:  negative for, vomiting, diarrhea, constipation, abdominal pain,+ Nausea  Genitourinary:  negative for frequency, dysuria  Integument/Breast:  negative for rash, skin lesions  Musculoskeletal:  negative for muscle aches, joint pain  Neurological:  negative for headaches, dizziness, lightheadedness, numbness, pain, tingling extremities  Behavior/Psych:  negative for depression, anxiety    Vitals:  BP (!) 94/53   Pulse 77   Temp 98 °F (36.7 °C) (Oral)   Resp 15   Ht 5' 5\" (1.651 m)   Wt (!) 320 lb (145.2 kg)   SpO2 96%   BMI 53.25 kg/m²     PHYSICAL EXAM:  General appearance - alert, well appearing, and in no acute distress  Mental status - oriented to person, place, and time with normal affect  Head - normocephalic and atraumatic  Eyes - pupils equal and reactive, extraocular eye movements intact, conjunctiva clear  Ears - hearing appears to be intact  Nose - no drainage noted  Mouth - mucous membranes moist  Neck - supple, no carotid bruits, thyroid not palpable  Chest - Not clear to auscultation, normal effort,few exp wheezes, no r/r  Heart - normal rate, regular rhythm, no murmur  Abdomen - soft, nontender, nondistended, bowel sounds present all four quadrants, no masses, hepatomegaly or splenomegaly  Neurological - normal speech, no focal findings or movement disorder noted, cranial nerves II through XII grossly intact  Extremities - peripheral pulses palpable, no pedal edema or calf pain with palpation  Skin - no gross lesions, rashes, or induration noted      DATA:  Hematology:No results for input(s): WBC, HGB, HCT, PLT, SEDRATE, CRP, INR in the last 72 hours. Invalid input(s): PT  Chemistry:No results for input(s): NA, K, CL, CO2, GLUCOSE, BUN, CREATININE, MG, ANIONGAP, LABGLOM, GFRAA, CALCIUM, CAION, PHOS, PSA, BNP, TROPONINI, CKTOTAL, CKMB, CKMBINDEX, MYOGLOBIN in the last 72 hours. No results for input(s): PROT, LABALBU, LABA1C, M8UHTHH, O7OWEML, FT4, TSH, AST, ALT, LDH, GGT, ALKPHOS, BILITOT, BILIDIR, AMMONIA, AMYLASE, LIPASE, LACTATE, CHOL, HDL, LDLCHOLESTEROL, CHOLHDLRATIO, TRIG, VLDL, PHENYTOIN, PHENYF in the last 72 hours. ASSESSMENT:  · S/P RTKA  · Nausea  · Wheezing/COPD  · HTN  · M Obesity 2/2 increase calories  · Colostomy in place  · Depression  · Lymphedema BLE    PLAN:  · Duonb. Zofran. Stool softners and Miralax    Thank you for this consult.   Electronically signed by Mikey Bryson MD on 3/11/2020 at 7:46 PM

## 2020-03-11 NOTE — PROGRESS NOTES
SW faxed referral to [facility]. As a bundle patient,  Pt will be accepted on Thursday if the admission order is signed by midnight today. 7000 is started in [facility]ENS.

## 2020-03-11 NOTE — CARE COORDINATION
This patient is being followed by [address] [place] [address] for the [facility] Bundle:  Date of Surgery: 3/10/2020   Medicare 90-day Post-Acute Bundle Initiative.

## 2020-03-11 NOTE — DISCHARGE INSTR - COC
Continuity of Care Form    Patient Name: Esdras Hinojosa   :  1955  MRN:  [id number]    Admit date:  3/10/2020  Discharge date:  3/17/2020    Code Status Order: Full Code   Advance Directives:   Advance Care Flowsheet Documentation     Date/Time Healthcare Directive Type of Healthcare Directive Copy in [address] Agent's Name Healthcare Agent's Phone Number    03/10/20 1738  No, patient does not have an advance directive for healthcare treatment -- -- -- -- --    03/10/20 1036  No, patient does not have an advance directive for healthcare treatment declined info -- -- -- -- --          Admitting Physician:  Safia Thomas MD  PCP: Merced Torres MD    Discharging Nurse: [address] Unit/Room#: 2038/2038-01  Discharging Unit Phone Number: [phone]    Emergency Contact:   Extended Emergency Contact Information  Primary Emergency Contact: [facility] Phone: [phone]  Work Phone: [phone]  Relation: Brother/Sister  Secondary Emergency Contact: Alistair Lanier  Address: [address] [place] [address] Phone: [phone]  Mobile Phone: [phone]  Relation: Child    Past Surgical History:  Past Surgical History:   Procedure Laterality Date    CARPAL TUNNEL RELEASE Right 2014    CARPAL TUNNEL RELEASE Left 09/10/2014     SECTION      X2    CHOLECYSTECTOMY      COLON SURGERY      ATTEMPTED TO REVERSE COLOSTOMY UNSUCCESSFUL    COLONOSCOPY      COLOSTOMY      for ruptured diverticuli    GASTRIC BYPASS SURGERY      HAND SURGERY Left 2019    to shave down bone growth    JOINT REPLACEMENT Left 2019    TONSILLECTOMY      TOTAL KNEE ARTHROPLASTY Left 2019    KNEE TOTAL ARTHROPLASTY performed by Safia Thomas MD at [address] [place] [address] Right 3/10/2020    KNEE COMPLEX TOTAL ARTHROPLASTY performed by Safia Thomas MD at [facility] History:   Immunization History   Administered Date(s) Administered    Influenza, Quadv, IM, PF (6 mo and older Fluzone, Flulaval, Fluarix, and 3 yrs and older Afluria) 09/20/2019       Active Problems:  Patient Active Problem List   Diagnosis Code    Asthmatic bronchitis with acute exacerbation J45. 0    Primary osteoarthritis of both knees M17.0    Primary cough headache G44.83    SBO (small bowel obstruction) ([facility]) K56.609    Ventral hernia K43.9    Morbid obesity due to excess calories ([facility]) E66.01    Essential hypertension I10    Intermittent asthma J45.20    Anasarca R60.1    Acute kidney injury ([facility]) N17.9    Urinary tract infection without hematuria N39.0    Primary osteoarthritis of left knee M17.12    Hyperkalemia E87.5    Acute gastritis without hemorrhage K29.00    Open wound of left knee S81.002A    Delayed surgical wound healing T81.89XA    Status post total left knee replacement Z96.652    Primary osteoarthritis of right knee M17.11       Isolation/Infection:   Isolation          Contact        Patient Infection Status     Infection Onset Added Last Indicated Last Indicated By Review Planned Expiration Resolved Resolved By    MDRO (multi-drug resistant organism) 02/25/20 02/28/20 02/25/20 Culture, Urine        Klebsiella + ESBL    ESBL (Extended Spectrum Beta Lactamase) 02/25/20 02/28/20 02/25/20 Culture, Urine        Klebsiella -urine    MRSA 09/04/19 09/04/19 09/04/19 MRSA DNA Probe, Nasal              Nurse Assessment:  Last Vital Signs: BP (!) 101/53   Pulse 69   Temp 97.6 °F (36.4 °C) (Oral)   Resp 16   Ht 5' 5\" (1.651 m)   Wt (!) 320 lb (145.2 kg)   SpO2 98%   BMI 53.25 kg/m²     Last documented pain score (0-10 scale): Pain Level: 4  Last Weight:   Wt Readings from Last 1 Encounters:   03/10/20 (!) 320 lb (145.2 kg)     Mental Status:  oriented and alert    IV Access:  - None    Nursing Mobility/ADLs:  Walking   Assisted  Transfer  Assisted  Bathing Assisted  Dressing  Assisted  Toileting  Assisted  Feeding  Independent  Med Admin  Assisted  Med Delivery   whole    Wound Care Documentation and Therapy:        Elimination:  Continence:   · Bowel: No  · Bladder: Yes  Urinary Catheter: Removal Date 3/16/2020   Colostomy/Ileostomy/Ileal Conduit: Yes  Colostomy LLQ-Stomal Appliance: 2 piece  Colostomy LLQ-Stoma  Assessment: Red  Colostomy LLQ-Peristomal Assessment: Clean, Intact  Colostomy LLQ-Stool Appearance: Soft  Colostomy LLQ-Stool Color: Brown  Colostomy LLQ-Stool Amount: Small    Date of Last BM: 3/17/2020    Intake/Output Summary (Last 24 hours) at 3/11/2020 0929  Last data filed at 3/11/2020 0731  Gross per 24 hour   Intake 1050 ml   Output 750 ml   Net 300 ml     I/O last 3 completed shifts: In: 1050 [I.V.:1050]  Out: 450 [Urine:400; Blood:50]    Safety Concerns: At Risk for Falls    Impairments/Disabilities:      None    Nutrition Therapy:  Current Nutrition Therapy:   - Oral Diet:  General    Routes of Feeding: Oral  Liquids: No Restrictions  Daily Fluid Restriction: no  Last Modified Barium Swallow with Video (Video Swallowing Test): not done    Treatments at the Time of Hospital Discharge:   Respiratory Treatments: see mar  Oxygen Therapy:  is not on home oxygen therapy. Ventilator:    - No ventilator support    Rehab Therapies: Physical Therapy and Occupational Therapy  Weight Bearing Status/Restrictions: No weight bearing restirctions  Other Medical Equipment (for information only, NOT a DME order):  walker  Other Treatments: skilled nursing assessment, medication education and monitoring    TOTAL JOINT REPLACEMENT PATIENT SKILLED NURSING FACILITY PROTOCOL    This patient is a post-operative total joint replacement patient. Expectations for this patients care are as follows:    Goals:   Increased level of activity and ambulation each day.  Well-controlled pain.  Free from infection.    Encourage patient to provide self-care when possible.  DISCHARGE TO HOME IN 7 DAYS, OR SOONER, IF GOALS ARE MET. Activity & Diet:   Therapy to be performed twice daily. (Medicate patient 1 hr. prior to therapy.)   Up to chair for all meals.  Range of motion for TKA patients.  Hip precautions for JANINE patients.  Incentive Spirometer: 3-4 inhalations in a row, every twenty to thirty minutes, during the day, while awake.  Increase oral intake of fruits, fiber and water to prevent constipation.  Consider bowel protocol.  Increase protein intake/reduce high-sugar intake to help promote healing and prevent infection.  No pillow under the knee for TKA patients.  Terrance Hose on in the am and off in the pm.    Incision Care:   If using BEREKET dressing or Aquacel dressing - keep dressing intact until seen and removed by surgeon, unless saturated, in which case, call surgeon and request instructions. If dressing falls off, call surgeon.  Aquacel dressing is waterproof and patient may shower as of POD #1.  Prevena dressing is water-resistant and patient may also shower POD #1 with dressing in place, must place battery pack in a waterproof bag during shower.  Ice affected area four times a day, for twenty minutes. Normal Conditions-These will improve with time and available comfort meaures:   Swelling in the operative leg is normal for a few weeks - this should reduce over time. Use mobility, ice and elevation to improve.  Some post-operative pain is normal!  Use pain medications, ice & repositioning.  Constipation related to the use of narcotic pain medications and decreased mobility is a common occurrence - increase fiber & water intake, take a stool softener, eat meals sitting upright and mobilize to help alleviate constipation.  Slight warmth of operative leg is normal.  Will improve with time.      Fatigue and moderate pain after therapy is very normal!  Use pain medications, ice, repositioning, distraction - music, tv, reading a book.  Numbness near the incision site is normal - this will improve with time.  NOTE: Ensure/Remind patient to go to their follow-up appointment with their orthopedic surgeon, which is usually scheduled for 7-10 days after the surgery. Abnormal Conditions - When to call the Surgeon:   Increasing/excessive redness, warmth or swelling at the incisional site not relieved with ice and elevation.  Increasing/excessive pain not well-controlled by prescribed medications.  Drainage or odor from or around the incision site.  (A little blood showing through the bandage is ok, but active leaking, of any color, coming out of the bandage is NOT normal.   Temperature above 101 degrees. (A mild temp of 99 - 100 is normal - if temp gets to 101 you may use Tylenol once - if it does not improve, you may use a 2nd dose of Tylenol after 5 hours - if temperature is still at or above 101 degrees then call the surgeon.)   Calf tenderness, swelling, or redness or numbness of the foot/lower leg.  Shortness of breath or chest pain.  Any other incision or surgical-related concerns.  CALL SURGEON with concerns PRIOR TO sending patient to hospital.      Patient's personal belongings (please select all that are sent with patient):  Shahida    RN SIGNATURE:  Electronically signed by Kandace Rivera RN on 3/17/20 at 1:14 PM EDT    CASE MANAGEMENT/SOCIAL WORK SECTION    Inpatient Status Date: 3/11/2020    Readmission Risk Assessment Score:  Readmission Risk              Risk of Unplanned Readmission:        0           Discharging to Facility/ [address], [address] [facility]  Phone  [phone]  · Fax  [phone]    Please refer patient to [facility] when discharged from your facility for home health services.   [address]  [address] [facility] [address]  Phone [phone]  Fax  [phone]      /Social Worker signature: Electronically signed by Jericho Katz RN on 3/11/20 at 9:29 AM EDT    PHYSICIAN SECTION    Prognosis: {Prognosis:4346840768}    Condition at Discharge: [address] Patient Condition:[id number]}    Rehab Potential (if transferring to Rehab): {Prognosis:8623938977}    Recommended Labs or Other Treatments After Discharge: ***    Physician Certification: I certify the above information and transfer of Denise Dunn  is necessary for the continuing treatment of the diagnosis listed and that she requires {Admit to Appropriate Level of Care:64518} for {GREATER/LESS:403052196} 30 days.      Update Admission H&P: {CHP DME Changes in [facility]:991261526}    PHYSICIAN SIGNATURE:  Electronically signed by Julio C Arzate MD on 3/11/20 at 12:44 PM EDT

## 2020-03-12 ENCOUNTER — APPOINTMENT (OUTPATIENT)
Dept: ULTRASOUND IMAGING | Age: 65
DRG: 469 | End: 2020-03-12
Attending: ORTHOPAEDIC SURGERY
Payer: MEDICARE

## 2020-03-12 ENCOUNTER — APPOINTMENT (OUTPATIENT)
Dept: GENERAL RADIOLOGY | Age: 65
DRG: 469 | End: 2020-03-12
Attending: ORTHOPAEDIC SURGERY
Payer: MEDICARE

## 2020-03-12 ENCOUNTER — APPOINTMENT (OUTPATIENT)
Dept: CT IMAGING | Age: 65
DRG: 469 | End: 2020-03-12
Attending: ORTHOPAEDIC SURGERY
Payer: MEDICARE

## 2020-03-12 PROBLEM — J45.901 ASTHMATIC BRONCHITIS WITH ACUTE EXACERBATION: Chronic | Status: ACTIVE | Noted: 2017-03-31

## 2020-03-12 PROBLEM — N17.0 ACUTE KIDNEY INJURY (AKI) WITH ACUTE TUBULAR NECROSIS (ATN) (HCC): Status: ACTIVE | Noted: 2020-03-12

## 2020-03-12 PROBLEM — N18.2 STAGE 2 CHRONIC KIDNEY DISEASE: Status: ACTIVE | Noted: 2020-03-12

## 2020-03-12 PROBLEM — I95.81 POSTPROCEDURAL HYPOTENSION: Status: ACTIVE | Noted: 2020-03-12

## 2020-03-12 PROBLEM — R26.2 DISABILITY OF WALKING: Status: ACTIVE | Noted: 2020-03-12

## 2020-03-12 PROBLEM — J45.20 INTERMITTENT ASTHMA: Chronic | Status: ACTIVE | Noted: 2017-05-02

## 2020-03-12 PROBLEM — Z93.3 COLOSTOMY PRESENT (HCC): Status: ACTIVE | Noted: 2020-03-12

## 2020-03-12 PROBLEM — M17.0 PRIMARY OSTEOARTHRITIS OF BOTH KNEES: Chronic | Status: ACTIVE | Noted: 2017-03-31

## 2020-03-12 PROBLEM — K43.9 VENTRAL HERNIA: Chronic | Status: ACTIVE | Noted: 2017-05-01

## 2020-03-12 PROBLEM — M17.0 ARTHRITIS OF BOTH KNEES: Status: ACTIVE | Noted: 2020-03-12

## 2020-03-12 PROBLEM — F32.2 SEVERE DEPRESSION (HCC): Status: ACTIVE | Noted: 2020-03-12

## 2020-03-12 PROBLEM — J44.9 CHRONIC OBSTRUCTIVE PULMONARY DISEASE (HCC): Status: ACTIVE | Noted: 2020-03-12

## 2020-03-12 PROBLEM — G47.33 OBSTRUCTIVE SLEEP APNEA SYNDROME: Status: ACTIVE | Noted: 2020-03-12

## 2020-03-12 PROBLEM — I89.0 LYMPHEDEMA: Status: ACTIVE | Noted: 2020-03-12

## 2020-03-12 LAB
ABSOLUTE EOS #: 0 K/UL (ref 0–0.4)
ABSOLUTE IMMATURE GRANULOCYTE: ABNORMAL K/UL (ref 0–0.3)
ABSOLUTE LYMPH #: 0.5 K/UL (ref 1–4.8)
ABSOLUTE MONO #: 0.1 K/UL (ref 0.1–1.3)
ALLEN TEST: ABNORMAL
AMYLASE: 286 U/L (ref 28–100)
ANION GAP SERPL CALCULATED.3IONS-SCNC: 14 MMOL/L (ref 9–17)
ANION GAP SERPL CALCULATED.3IONS-SCNC: 17 MMOL/L (ref 9–17)
BASOPHILS # BLD: 0 % (ref 0–2)
BASOPHILS ABSOLUTE: 0 K/UL (ref 0–0.2)
BNP INTERPRETATION: ABNORMAL
BUN BLDV-MCNC: 58 MG/DL (ref 8–23)
BUN BLDV-MCNC: 62 MG/DL (ref 8–23)
BUN/CREAT BLD: ABNORMAL (ref 9–20)
BUN/CREAT BLD: ABNORMAL (ref 9–20)
CALCIUM IONIZED: 1.07 MMOL/L (ref 1.13–1.33)
CALCIUM SERPL-MCNC: 7.8 MG/DL (ref 8.6–10.4)
CALCIUM SERPL-MCNC: 8 MG/DL (ref 8.6–10.4)
CARBOXYHEMOGLOBIN: 1.1 % (ref 0–5)
CHLORIDE BLD-SCNC: 105 MMOL/L (ref 98–107)
CHLORIDE BLD-SCNC: 108 MMOL/L (ref 98–107)
CO2: 17 MMOL/L (ref 20–31)
CO2: 17 MMOL/L (ref 20–31)
CORTISOL COLLECTION INFO: ABNORMAL
CORTISOL: 18.9 UG/DL (ref 2.7–18.4)
CREAT SERPL-MCNC: 3.4 MG/DL (ref 0.5–0.9)
CREAT SERPL-MCNC: 3.5 MG/DL (ref 0.5–0.9)
CREATININE URINE: 141.3 MG/DL (ref 28–217)
DIFFERENTIAL TYPE: ABNORMAL
EOSINOPHIL,URINE: NORMAL
EOSINOPHILS RELATIVE PERCENT: 1 % (ref 0–4)
ESTIMATED AVERAGE GLUCOSE: 117 MG/DL
FIO2: ABNORMAL
GFR AFRICAN AMERICAN: 16 ML/MIN
GFR AFRICAN AMERICAN: 16 ML/MIN
GFR NON-AFRICAN AMERICAN: 13 ML/MIN
GFR NON-AFRICAN AMERICAN: 14 ML/MIN
GFR SERPL CREATININE-BSD FRML MDRD: ABNORMAL ML/MIN/{1.73_M2}
GLUCOSE BLD-MCNC: 121 MG/DL (ref 70–99)
GLUCOSE BLD-MCNC: 122 MG/DL (ref 65–105)
GLUCOSE BLD-MCNC: 139 MG/DL (ref 65–105)
GLUCOSE BLD-MCNC: 140 MG/DL (ref 65–105)
GLUCOSE BLD-MCNC: 152 MG/DL (ref 70–99)
HBA1C MFR BLD: 5.7 % (ref 4–6)
HCO3 ARTERIAL: 17.5 MMOL/L (ref 22–26)
HCT VFR BLD CALC: 34.3 % (ref 36–46)
HEMOGLOBIN: 10.8 G/DL (ref 12–16)
IMMATURE GRANULOCYTES: ABNORMAL %
LACTIC ACID: 2.3 MMOL/L (ref 0.5–2.2)
LACTIC ACID: 2.5 MMOL/L (ref 0.5–2.2)
LACTIC ACID: 2.5 MMOL/L (ref 0.5–2.2)
LIPASE: 10 U/L (ref 13–60)
LV EF: 63 %
LVEF MODALITY: NORMAL
LYMPHOCYTES # BLD: 12 % (ref 24–44)
MCH RBC QN AUTO: 28.1 PG (ref 26–34)
MCHC RBC AUTO-ENTMCNC: 31.6 G/DL (ref 31–37)
MCV RBC AUTO: 89 FL (ref 80–100)
METHEMOGLOBIN: 0.9 % (ref 0–1.9)
MODE: ABNORMAL
MONOCYTES # BLD: 2 % (ref 1–7)
NEGATIVE BASE EXCESS, ART: ABNORMAL MMOL/L (ref 0–2)
NOTIFICATION TIME: ABNORMAL
NOTIFICATION: ABNORMAL
NRBC AUTOMATED: ABNORMAL PER 100 WBC
O2 DEVICE/FLOW/%: ABNORMAL
O2 SAT, ARTERIAL: 93.1 % (ref 95–98)
OXYHEMOGLOBIN: ABNORMAL % (ref 95–98)
PATIENT TEMP: 37
PCO2 ARTERIAL: 36.4 MMHG (ref 35–45)
PCO2, ART, TEMP ADJ: ABNORMAL (ref 35–45)
PDW BLD-RTO: 15.8 % (ref 11.5–14.9)
PEEP/CPAP: ABNORMAL
PH ARTERIAL: 7.29 (ref 7.35–7.45)
PH, ART, TEMP ADJ: ABNORMAL (ref 7.35–7.45)
PLATELET # BLD: 106 K/UL (ref 150–450)
PLATELET ESTIMATE: ABNORMAL
PMV BLD AUTO: 8.9 FL (ref 6–12)
PO2 ARTERIAL: 68.7 MMHG (ref 80–100)
PO2, ART, TEMP ADJ: ABNORMAL MMHG (ref 80–100)
POSITIVE BASE EXCESS, ART: 17.5 MMOL/L (ref 0–2)
POTASSIUM SERPL-SCNC: 4.5 MMOL/L (ref 3.7–5.3)
POTASSIUM SERPL-SCNC: 4.9 MMOL/L (ref 3.7–5.3)
PRO-BNP: 858 PG/ML
PSV: ABNORMAL
PT. POSITION: ABNORMAL
RBC # BLD: 3.85 M/UL (ref 4–5.2)
RBC # BLD: ABNORMAL 10*6/UL
RESPIRATORY RATE: 22
SAMPLE SITE: ABNORMAL
SEG NEUTROPHILS: 85 % (ref 36–66)
SEGMENTED NEUTROPHILS ABSOLUTE COUNT: 3.4 K/UL (ref 1.3–9.1)
SET RATE: ABNORMAL
SODIUM BLD-SCNC: 139 MMOL/L (ref 135–144)
SODIUM BLD-SCNC: 139 MMOL/L (ref 135–144)
SODIUM,UR: 51 MMOL/L
TEXT FOR RESPIRATORY: ABNORMAL
TOTAL HB: ABNORMAL G/DL (ref 12–16)
TOTAL RATE: ABNORMAL
VT: ABNORMAL
WBC # BLD: 4.1 K/UL (ref 3.5–11)
WBC # BLD: ABNORMAL 10*3/UL

## 2020-03-12 PROCEDURE — 82150 ASSAY OF AMYLASE: CPT

## 2020-03-12 PROCEDURE — 0D9670Z DRAINAGE OF STOMACH WITH DRAINAGE DEVICE, VIA NATURAL OR ARTIFICIAL OPENING: ICD-10-PCS | Performed by: SURGERY

## 2020-03-12 PROCEDURE — 82947 ASSAY GLUCOSE BLOOD QUANT: CPT

## 2020-03-12 PROCEDURE — 51702 INSERT TEMP BLADDER CATH: CPT

## 2020-03-12 PROCEDURE — 80048 BASIC METABOLIC PNL TOTAL CA: CPT

## 2020-03-12 PROCEDURE — 83880 ASSAY OF NATRIURETIC PEPTIDE: CPT

## 2020-03-12 PROCEDURE — 82570 ASSAY OF URINE CREATININE: CPT

## 2020-03-12 PROCEDURE — 85025 COMPLETE CBC W/AUTO DIFF WBC: CPT

## 2020-03-12 PROCEDURE — 83690 ASSAY OF LIPASE: CPT

## 2020-03-12 PROCEDURE — 2580000003 HC RX 258: Performed by: INTERNAL MEDICINE

## 2020-03-12 PROCEDURE — 71045 X-RAY EXAM CHEST 1 VIEW: CPT

## 2020-03-12 PROCEDURE — 3E033XZ INTRODUCTION OF VASOPRESSOR INTO PERIPHERAL VEIN, PERCUTANEOUS APPROACH: ICD-10-PCS | Performed by: INTERNAL MEDICINE

## 2020-03-12 PROCEDURE — 82330 ASSAY OF CALCIUM: CPT

## 2020-03-12 PROCEDURE — 87150 DNA/RNA AMPLIFIED PROBE: CPT

## 2020-03-12 PROCEDURE — 36600 WITHDRAWAL OF ARTERIAL BLOOD: CPT

## 2020-03-12 PROCEDURE — 6360000002 HC RX W HCPCS: Performed by: FAMILY MEDICINE

## 2020-03-12 PROCEDURE — 36415 COLL VENOUS BLD VENIPUNCTURE: CPT

## 2020-03-12 PROCEDURE — 6360000002 HC RX W HCPCS: Performed by: INTERNAL MEDICINE

## 2020-03-12 PROCEDURE — 87077 CULTURE AEROBIC IDENTIFY: CPT

## 2020-03-12 PROCEDURE — 87205 SMEAR GRAM STAIN: CPT

## 2020-03-12 PROCEDURE — 87186 SC STD MICRODIL/AGAR DIL: CPT

## 2020-03-12 PROCEDURE — 87086 URINE CULTURE/COLONY COUNT: CPT

## 2020-03-12 PROCEDURE — 6370000000 HC RX 637 (ALT 250 FOR IP): Performed by: SURGERY

## 2020-03-12 PROCEDURE — 2000000000 HC ICU R&B

## 2020-03-12 PROCEDURE — 2700000000 HC OXYGEN THERAPY PER DAY

## 2020-03-12 PROCEDURE — 84300 ASSAY OF URINE SODIUM: CPT

## 2020-03-12 PROCEDURE — 87040 BLOOD CULTURE FOR BACTERIA: CPT

## 2020-03-12 PROCEDURE — 02HV33Z INSERTION OF INFUSION DEVICE INTO SUPERIOR VENA CAVA, PERCUTANEOUS APPROACH: ICD-10-PCS | Performed by: INTERNAL MEDICINE

## 2020-03-12 PROCEDURE — 74176 CT ABD & PELVIS W/O CONTRAST: CPT

## 2020-03-12 PROCEDURE — 93306 TTE W/DOPPLER COMPLETE: CPT

## 2020-03-12 PROCEDURE — 2580000003 HC RX 258: Performed by: ORTHOPAEDIC SURGERY

## 2020-03-12 PROCEDURE — 82533 TOTAL CORTISOL: CPT

## 2020-03-12 PROCEDURE — 97110 THERAPEUTIC EXERCISES: CPT

## 2020-03-12 PROCEDURE — 83036 HEMOGLOBIN GLYCOSYLATED A1C: CPT

## 2020-03-12 PROCEDURE — 94640 AIRWAY INHALATION TREATMENT: CPT

## 2020-03-12 PROCEDURE — 82805 BLOOD GASES W/O2 SATURATION: CPT

## 2020-03-12 PROCEDURE — 83605 ASSAY OF LACTIC ACID: CPT

## 2020-03-12 PROCEDURE — 76770 US EXAM ABDO BACK WALL COMP: CPT

## 2020-03-12 PROCEDURE — 2500000003 HC RX 250 WO HCPCS

## 2020-03-12 PROCEDURE — 6370000000 HC RX 637 (ALT 250 FOR IP): Performed by: FAMILY MEDICINE

## 2020-03-12 PROCEDURE — 2500000003 HC RX 250 WO HCPCS: Performed by: INTERNAL MEDICINE

## 2020-03-12 PROCEDURE — 99024 POSTOP FOLLOW-UP VISIT: CPT | Performed by: ORTHOPAEDIC SURGERY

## 2020-03-12 PROCEDURE — 2580000003 HC RX 258: Performed by: NURSE PRACTITIONER

## 2020-03-12 PROCEDURE — 76705 ECHO EXAM OF ABDOMEN: CPT

## 2020-03-12 PROCEDURE — 36556 INSERT NON-TUNNEL CV CATH: CPT

## 2020-03-12 RX ORDER — DEXTROSE MONOHYDRATE 25 G/50ML
12.5 INJECTION, SOLUTION INTRAVENOUS PRN
Status: DISCONTINUED | OUTPATIENT
Start: 2020-03-12 | End: 2020-03-17 | Stop reason: HOSPADM

## 2020-03-12 RX ORDER — SODIUM CHLORIDE 9 MG/ML
INJECTION, SOLUTION INTRAVENOUS CONTINUOUS
Status: DISCONTINUED | OUTPATIENT
Start: 2020-03-12 | End: 2020-03-12

## 2020-03-12 RX ORDER — 0.9 % SODIUM CHLORIDE 0.9 %
1000 INTRAVENOUS SOLUTION INTRAVENOUS ONCE
Status: COMPLETED | OUTPATIENT
Start: 2020-03-12 | End: 2020-03-12

## 2020-03-12 RX ORDER — ALBUTEROL SULFATE 2.5 MG/3ML
2.5 SOLUTION RESPIRATORY (INHALATION) EVERY 4 HOURS
Status: DISCONTINUED | OUTPATIENT
Start: 2020-03-12 | End: 2020-03-17 | Stop reason: HOSPADM

## 2020-03-12 RX ORDER — 0.9 % SODIUM CHLORIDE 0.9 %
500 INTRAVENOUS SOLUTION INTRAVENOUS ONCE
Status: COMPLETED | OUTPATIENT
Start: 2020-03-12 | End: 2020-03-12

## 2020-03-12 RX ORDER — NICOTINE POLACRILEX 4 MG
15 LOZENGE BUCCAL PRN
Status: DISCONTINUED | OUTPATIENT
Start: 2020-03-12 | End: 2020-03-17 | Stop reason: HOSPADM

## 2020-03-12 RX ORDER — IPRATROPIUM BROMIDE AND ALBUTEROL SULFATE 2.5; .5 MG/3ML; MG/3ML
1 SOLUTION RESPIRATORY (INHALATION) ONCE
Status: COMPLETED | OUTPATIENT
Start: 2020-03-12 | End: 2020-03-12

## 2020-03-12 RX ORDER — SODIUM CHLORIDE, SODIUM LACTATE, POTASSIUM CHLORIDE, AND CALCIUM CHLORIDE .6; .31; .03; .02 G/100ML; G/100ML; G/100ML; G/100ML
1000 INJECTION, SOLUTION INTRAVENOUS ONCE
Status: COMPLETED | OUTPATIENT
Start: 2020-03-12 | End: 2020-03-12

## 2020-03-12 RX ORDER — FENTANYL CITRATE 50 UG/ML
25 INJECTION, SOLUTION INTRAMUSCULAR; INTRAVENOUS EVERY 30 MIN PRN
Status: DISCONTINUED | OUTPATIENT
Start: 2020-03-12 | End: 2020-03-17 | Stop reason: HOSPADM

## 2020-03-12 RX ORDER — LIDOCAINE HYDROCHLORIDE 10 MG/ML
INJECTION, SOLUTION INFILTRATION; PERINEURAL
Status: COMPLETED
Start: 2020-03-12 | End: 2020-03-12

## 2020-03-12 RX ORDER — POLYETHYLENE GLYCOL 3350 17 G/17G
17 POWDER, FOR SOLUTION ORAL DAILY PRN
Status: DISCONTINUED | OUTPATIENT
Start: 2020-03-12 | End: 2020-03-17 | Stop reason: HOSPADM

## 2020-03-12 RX ORDER — METHYLPREDNISOLONE SODIUM SUCCINATE 125 MG/2ML
125 INJECTION, POWDER, LYOPHILIZED, FOR SOLUTION INTRAMUSCULAR; INTRAVENOUS ONCE
Status: COMPLETED | OUTPATIENT
Start: 2020-03-12 | End: 2020-03-12

## 2020-03-12 RX ORDER — DEXTROSE MONOHYDRATE 50 MG/ML
100 INJECTION, SOLUTION INTRAVENOUS PRN
Status: DISCONTINUED | OUTPATIENT
Start: 2020-03-12 | End: 2020-03-17 | Stop reason: HOSPADM

## 2020-03-12 RX ORDER — METHYLPREDNISOLONE SODIUM SUCCINATE 125 MG/2ML
60 INJECTION, POWDER, LYOPHILIZED, FOR SOLUTION INTRAMUSCULAR; INTRAVENOUS EVERY 6 HOURS
Status: DISCONTINUED | OUTPATIENT
Start: 2020-03-12 | End: 2020-03-13

## 2020-03-12 RX ADMIN — ALBUTEROL SULFATE 2.5 MG: 2.5 SOLUTION RESPIRATORY (INHALATION) at 16:08

## 2020-03-12 RX ADMIN — SODIUM CHLORIDE 1000 ML: 9 INJECTION, SOLUTION INTRAVENOUS at 04:30

## 2020-03-12 RX ADMIN — LIDOCAINE HYDROCHLORIDE 200 MG: 10 INJECTION, SOLUTION INFILTRATION; PERINEURAL at 10:04

## 2020-03-12 RX ADMIN — METHYLPREDNISOLONE SODIUM SUCCINATE 60 MG: 125 INJECTION, POWDER, FOR SOLUTION INTRAMUSCULAR; INTRAVENOUS at 20:33

## 2020-03-12 RX ADMIN — SODIUM CHLORIDE: 9 INJECTION, SOLUTION INTRAVENOUS at 10:20

## 2020-03-12 RX ADMIN — IPRATROPIUM BROMIDE AND ALBUTEROL SULFATE 1 AMPULE: .5; 3 SOLUTION RESPIRATORY (INHALATION) at 02:24

## 2020-03-12 RX ADMIN — BENZOCAINE, BUTAMBEN, AND TETRACAINE HYDROCHLORIDE 1 SPRAY: .028; .004; .004 AEROSOL, SPRAY TOPICAL at 19:35

## 2020-03-12 RX ADMIN — CEFEPIME HYDROCHLORIDE 1 G: 1 INJECTION, POWDER, FOR SOLUTION INTRAMUSCULAR; INTRAVENOUS at 10:42

## 2020-03-12 RX ADMIN — Medication 10 ML: at 20:33

## 2020-03-12 RX ADMIN — ALBUTEROL SULFATE 2.5 MG: 2.5 SOLUTION RESPIRATORY (INHALATION) at 23:47

## 2020-03-12 RX ADMIN — SODIUM BICARBONATE: 84 INJECTION, SOLUTION INTRAVENOUS at 22:22

## 2020-03-12 RX ADMIN — SODIUM CHLORIDE, POTASSIUM CHLORIDE, SODIUM LACTATE AND CALCIUM CHLORIDE 1000 ML: 600; 310; 30; 20 INJECTION, SOLUTION INTRAVENOUS at 11:22

## 2020-03-12 RX ADMIN — SODIUM CHLORIDE 500 ML: 0.9 INJECTION, SOLUTION INTRAVENOUS at 02:43

## 2020-03-12 RX ADMIN — CALCIUM GLUCONATE 2 G: 98 INJECTION, SOLUTION INTRAVENOUS at 14:53

## 2020-03-12 RX ADMIN — SODIUM BICARBONATE: 84 INJECTION, SOLUTION INTRAVENOUS at 14:52

## 2020-03-12 RX ADMIN — ALBUTEROL SULFATE 2.5 MG: 2.5 SOLUTION RESPIRATORY (INHALATION) at 10:52

## 2020-03-12 RX ADMIN — METHYLPREDNISOLONE SODIUM SUCCINATE 60 MG: 125 INJECTION, POWDER, FOR SOLUTION INTRAMUSCULAR; INTRAVENOUS at 14:53

## 2020-03-12 RX ADMIN — ONDANSETRON 4 MG: 2 INJECTION INTRAMUSCULAR; INTRAVENOUS at 03:03

## 2020-03-12 RX ADMIN — SODIUM CHLORIDE 1000 ML: 9 INJECTION, SOLUTION INTRAVENOUS at 08:02

## 2020-03-12 RX ADMIN — METHYLPREDNISOLONE SODIUM SUCCINATE 125 MG: 125 INJECTION, POWDER, FOR SOLUTION INTRAMUSCULAR; INTRAVENOUS at 02:28

## 2020-03-12 RX ADMIN — NOREPINEPHRINE BITARTRATE 2 MCG/MIN: 1 INJECTION INTRAVENOUS at 05:35

## 2020-03-12 RX ADMIN — ALBUTEROL SULFATE 2.5 MG: 2.5 SOLUTION RESPIRATORY (INHALATION) at 19:45

## 2020-03-12 RX ADMIN — MEROPENEM 1 G: 1 INJECTION, POWDER, FOR SOLUTION INTRAVENOUS at 19:34

## 2020-03-12 RX ADMIN — METHYLPREDNISOLONE SODIUM SUCCINATE 60 MG: 125 INJECTION, POWDER, FOR SOLUTION INTRAMUSCULAR; INTRAVENOUS at 10:42

## 2020-03-12 ASSESSMENT — PAIN SCALES - WONG BAKER: WONGBAKER_NUMERICALRESPONSE: 2

## 2020-03-12 ASSESSMENT — PAIN DESCRIPTION - ORIENTATION: ORIENTATION: RIGHT

## 2020-03-12 ASSESSMENT — PAIN DESCRIPTION - LOCATION
LOCATION: ABDOMEN
LOCATION: ABDOMEN
LOCATION: KNEE

## 2020-03-12 ASSESSMENT — PAIN DESCRIPTION - FREQUENCY
FREQUENCY: INTERMITTENT
FREQUENCY: INTERMITTENT

## 2020-03-12 ASSESSMENT — PAIN SCALES - GENERAL
PAINLEVEL_OUTOF10: 2
PAINLEVEL_OUTOF10: 0
PAINLEVEL_OUTOF10: 9

## 2020-03-12 ASSESSMENT — PAIN DESCRIPTION - DESCRIPTORS
DESCRIPTORS: ACHING
DESCRIPTORS: SHARP

## 2020-03-12 ASSESSMENT — PAIN DESCRIPTION - PAIN TYPE
TYPE: ACUTE PAIN
TYPE: ACUTE PAIN

## 2020-03-12 NOTE — CONSULTS
General Surgery   History and Physical      PATIENT NAME: Rick Correia OF BIRTH: 1955    ADMISSION DATE: 3/10/2020  9:50 AM      TODAY'S DATE: 3/12/2020    CHIEF COMPLAINT:  Right sided abdominal pain      HISTORY OF PRESENT ILLNESS:  The patient is a 75 y.o. female  who presents with right sided abdominal pain that started yesterday after a right total knee replacement on Tuesday. She had a gastric bypass 8 years ago and lost about 200lbs,  Her highest weight was in the mid 500s. She had a perforated diverticulitis 10 years ago and has had a chronic colostomy. She was vomiting on the floor but not since arriving to the ICU. Her stoma has been putting out soft brown stool every day. She is having some mild right sided abdominal pain 3 out of 10. She is on a low dose of levophed and has some acute renal failure. Her pain is achy in nature.     Past Medical History:        Diagnosis Date    Anemia     on iron    Arthritis     Colostomy in place [facility])     Diverticulitis     Hx, had a rupture one that ended up in a colostomy    DJD (degenerative joint disease) of knee     H/O hand fracture     right hand was casted, no surgery    Hypertension     Morbid obesity with BMI of 50.0-59.9, adult ([facility] [address]     Vitamin D deficiency     Wears glasses     Wears partial dentures     upper and lower       Past Surgical History:        Procedure Laterality Date    CARPAL TUNNEL RELEASE Right 2014    CARPAL TUNNEL RELEASE Left 09/10/2014    CENTRAL LINE  3/12/2020          SECTION      X2    CHOLECYSTECTOMY      COLON SURGERY      ATTEMPTED TO REVERSE COLOSTOMY UNSUCCESSFUL    COLONOSCOPY      COLOSTOMY      for ruptured diverticuli    GASTRIC BYPASS SURGERY  2000    HAND SURGERY Left 2019    to shave down bone growth    JOINT REPLACEMENT Left 2019    TONSILLECTOMY      TOTAL KNEE ARTHROPLASTY Left 2019    KNEE TOTAL ARTHROPLASTY performed by Andrea Cuellar MD at [facility]    TOTAL KNEE ARTHROPLASTY Right 3/10/2020    KNEE COMPLEX TOTAL ARTHROPLASTY performed by Despina Opitz, MD at [address]       Medications Prior to Admission:   Medications Prior to Admission: sertraline (ZOLOFT) 50 MG tablet, Take 50 mg by mouth nightly  Calcium Carbonate Antacid (TUMS PO), Take 1 tablet by mouth 3 times daily  furosemide (LASIX) 40 MG tablet, Take 40 mg by mouth daily  spironolactone (ALDACTONE) 50 MG tablet, Take 50 mg by mouth daily  omeprazole (PRILOSEC) 20 MG delayed release capsule, Take 20 mg by mouth daily  ferrous sulfate 325 (65 FE) MG tablet, Take 325 mg by mouth daily (with breakfast)  Cholecalciferol (VITAMIN D3) 79819 units CAPS, Take 1 capsule by mouth once a week    Allergies:  Shellfish-derived products    Social History:   Social History     Socioeconomic History    Marital status:       Spouse name: Not on file    Number of children: Not on file    Years of education: Not on file    Highest education level: Not on file   Occupational History    Not on file   Social Needs    Financial resource strain: Not on file    Food insecurity     Worry: Not on file     Inability: Not on file    Transportation needs     Medical: Not on file     Non-medical: Not on file   Tobacco Use    Smoking status: Former Smoker     Packs/day: 0.50     Years: 36.00     Pack years: 18.00     Last attempt to quit: 2012     Years since quittin.0    Smokeless tobacco: Never Used   Substance and Sexual Activity    Alcohol use: No    Drug use: No    Sexual activity: Not on file   Lifestyle    Physical activity     Days per week: Not on file     Minutes per session: Not on file    Stress: Not on file   Relationships    Social connections     Talks on phone: Not on file     Gets together: Not on file     Attends Cheondoism service: Not on file     Active member of club or organization: Not on file     Attends meetings of clubs or organizations: Not on file     Relationship status: Not on file    Intimate partner violence     Fear of current or ex partner: Not on file     Emotionally abused: Not on file     Physically abused: Not on file     Forced sexual activity: Not on file   Other Topics Concern    Not on file   Social History Narrative    Not on file       Family History:       Problem Relation Age of Onset    Diabetes Mother     Heart Disease Father     Diabetes Father     Heart Failure Father        Review Of Systems (11 point):  Constitutional: No fever, chills or malaise; No weight change or fatigue  Head and Eyes: No vision, Headache, Dizziness or trauma in last 12 months  ENT ROS: No hearing, Tinnitis, sinus or taste problems  Hematological and Lymphatic ROS:No Lymphoma, Von Willebrand's, Hemophillia or Bleeding History  Psych ROS: No Depression, Homicidal thoughts,suicidal thoughts, or anxiety  Breast ROS: No prior breast abnormalities or lumps  Respiratory ROS: No SOB, Pneumoniae,Cough, or Pulmonary Embolism History  Cardiovascular ROS: No Chest Pain with Exertion, Palpitations, Syncope, Edema, Arrhythmia  Gastrointestinal ROS: No Indigestion, Heartburn, Nausea, vomiting, Diarrhea, Constipation,or Bowel Changes; No Bloody Stools or melena  Genito-Urinary ROS: No Dysuria, Hematuria or Nocturia.  No Urinary Incontinence or Vaginal Discharge  Musculoskeletal ROS: No Arthralgia, Arthritis,Gout,Osteoporosis or Rheumatism  Neurological ROS: No CVA, Migraines, Epilepsy, Seizure Hx, or Limb Weakness  Dermatological ROS: No Rash, Itching, Hives, Mole Changes or Cancer     PHYSICAL EXAM:    VITALS:  /62   Pulse 95   Temp 98.3 °F (36.8 °C) (Temporal)   Resp 14   Ht 5' 5\" (1.651 m)   Wt (!) 320 lb (145.2 kg)   SpO2 96%   BMI 53.25 kg/m²   INTAKE/OUTPUT:     Intake/Output Summary (Last 24 hours) at 3/12/2020 1837  Last data filed at 3/12/2020 1800  Gross per 24 hour   Intake 10 ml   Output 850 ml   Net -840 ml       CONSTITUTIONAL:  awake, alert, not distressed and morbidly obese  Head:  normocephalic/atraumatic, without obvious abnormality  Eyes: PERRL, Sclera non icteric  Mouth: dentition is good, mucous membranes dry and pink  NECK:  supple, symmetrical, trachea midline, no lymphadenopathy, no thyroid nodules, no carotid bruits  LUNGS:  CTA bilaterally, no ronchi, rales, or wheezes, no intercostal muscle retractions or accessory muscle use  CARDIOVASCULAR:  regular rate and rhythm and No Murmur, rub,  Or gallops  ABDOMEN: soft,  Large midline surgical scar, super morbidly obese and difficult exam.  Left peristomal hernia does reduce and then occurs again,   non distended,  No hepato-organomegaly tenderness noted in the right lower quadrant, no guarding present, no masses wide mouth midline hernia. MUSCULOSKELETAL:  Fresh total knee incision on right, Spine range of motion diminshed  normal. Muscular strength intact. , there is  obvious somatic dysfunction, gait absent  NEUROLOGIC:  Mental Status Exam:  Level of Alertness:   alert  Orientation:   oriented to person, place, and time  pshych judgement and insight is normal  Rectal: no mass, normal sphincter tone, no gross blood  Skin: no rashes, lesions, or ulcers    Physical Exam    CBC:   Lab Results   Component Value Date    WBC 4.1 03/12/2020    RBC 3.85 03/12/2020    HGB 10.8 03/12/2020    HCT 34.3 03/12/2020    MCV 89.0 03/12/2020    MCH 28.1 03/12/2020    MCHC 31.6 03/12/2020    RDW 15.8 03/12/2020     03/12/2020    MPV 8.9 03/12/2020     CMP:    Lab Results   Component Value Date     03/12/2020    K 4.9 03/12/2020     03/12/2020    CO2 17 03/12/2020    BUN 62 03/12/2020    CREATININE 3.40 03/12/2020    GFRAA 16 03/12/2020    LABGLOM 14 03/12/2020    GLUCOSE 152 03/12/2020    PROT 6.2 05/25/2019    LABALBU 3.5 05/25/2019    CALCIUM 7.8 03/12/2020    BILITOT 0.74 05/25/2019    ALKPHOS 337 05/25/2019    AST 23 05/25/2019    ALT 12 05/25/2019     Ionized Calcium:  No results found for: IONCA  Magnesium: favoring airspace   disease and atelectasis.       4.  Findings of bariatric surgery.  Status post cholecystectomy.       5.  No urinary obstruction.       Critical results were called by Dr. Lopez Gonzalez MD to [facility] on   3/12/2020 at 17:31.         ASSESSMENT   Principal Problem:    Primary osteoarthritis of right knee  Active Problems:    Asthmatic bronchitis with acute exacerbation    Primary osteoarthritis of both knees    Ventral hernia    Essential hypertension    Intermittent asthma    Postprocedural hypotension    Acute kidney injury (MIGUE) with acute tubular necrosis (ATN) (HCC)    Morbid obesity with BMI of 50.0-59.9, adult ([address]  Resolved Problems:    * No resolved hospital problems. *      PLAN    1. Abdomen is soft and left lower quadrant stomal hernia is reducible. NG tube decompression and careful monitoring  2.  Recommend repeat serial KUB        Electronically signed by Agustin Hill MD  on 3/12/2020 at 6:37 PM

## 2020-03-12 NOTE — PROGRESS NOTES
Patient transferred from 2016 to 2001 by bed on 40% venti mask. Alert oriented to room, all belongings in closet.

## 2020-03-12 NOTE — PROGRESS NOTES
Applied oxygen per orders at 3L/min for Pox 83% on RA. Rechecked Pox after oxygen applied and is now 93%. RN encouraged patient to use IS 10 breaths/hour. Needs reinforcement and further education while patient completes this. Patient does have effective coughing after and spits out small amount of white mucus. Will continue to monitor.

## 2020-03-12 NOTE — PROGRESS NOTES
RN phoned Yauco Hung RN in ICU for report on patient to be transferred to intermediate ICU. No further questions at this time.

## 2020-03-12 NOTE — PROGRESS NOTES
Physical Therapy  DATE: 3/12/2020    NAME: Brock Beth  MRN: [id number]   : 1955    Patient not seen this date for Physical Therapy due to:  [] Blood transfusion in progress  [x] Cancel by RN: Patient not appropriate for physical therapy this AM. Will check back in PM.  [] Hemodialysis  []  Refusal by Patient   [] Spine Precautions   [] Strict Bedrest  [] Surgery  [] Testing      [] Other        [] PT being discontinued at this time. Patient independent. No further needs. [] PT being discontinued at this time as the patient has been transferred to hospice care. No further needs.     Delores Rodrigues, PTA

## 2020-03-12 NOTE — CONSULTS
Consult Note    Reason for Consult: MIGUE    Requesting Physician:  Lauro Escobedo MD    HISTORY OF PRESENT ILLNESS:    The patient is a 75 y.o. female who is post op right TKA. Postop she developed hypotension with SBP 50s. She received IV 0.9NS and LR boluses. On levophed. SBP trending >100 most recently. CXR showed pulmonary vascular congestion and cardiomegaly with bibasilar atelectasis. Temp 99.3 this am. She is on cefepime and received ancef perioperatively. Creatinine 1.63 on 2/25/20. Creatinine 3.5 today with BUN 58,  bicarb 17, K 4.5. Lactic acid 2.3. ABG showed pH 7.291, PCO2 36.4, HCO3 17.5. She has ostomy. Home meds include lasix and aldactone. She received high dose neurontin 600mg on 3/10. UA on 2/25 showed  WBC with nitrites, 2-5 RBC, and large leukocyte esterase. Renal US showed right kidney 10.7cm and left 10.4 cm in 2017. No chills. Has ostomy and stool has been more formed recently. She states she feels \"kind of constipated\". No SOB, CP. Had lightheadedness earlier, which has improved. States pain is controlled until moving/standing. Had nausea earlier, which has resolved. Review Of Systems:   Constitutional: No fever, chills, lethargy, weakness. HEENT:  No headache, nasal discharge or sore throat. Cardiac:  No chest pain, dyspnea, orthopnea or PND. Chest:   No cough, phlegm or wheezing. Abdomen:  No abdominal pain, nausea, vomiting or diarrhea. Neuro:              No gross focal weakness, numbness, abnormal movements or seizure like activity. Skin:   No rashes or itching. :   No hematuria, pyuria, dysuria or flank pain. Extremities:  + joint pains. Right TKA  Endocrine: No polyuria, polydypsia, or thyroid problems. Hematology:    No bleeding disorders, bruising or anemia. All other ROS is negative.     Past Medical History:   Diagnosis Date    Anemia     on iron    Arthritis     Colostomy in place [facility])     Diverticulitis     Hx, had a rupture one that Stress: Not on file   Relationships    Social connections     Talks on phone: Not on file     Gets together: Not on file     Attends Baptism service: Not on file     Active member of club or organization: Not on file     Attends meetings of clubs or organizations: Not on file     Relationship status: Not on file    Intimate partner violence     Fear of current or ex partner: Not on file     Emotionally abused: Not on file     Physically abused: Not on file     Forced sexual activity: Not on file   Other Topics Concern    Not on file   Social History Narrative    Not on file       Family History   Problem Relation Age of Onset    Diabetes Mother     Heart Disease Father     Diabetes Father     Heart Failure Father          Physical Exam:  Vitals:    03/12/20 0730 03/12/20 0800 03/12/20 0815 03/12/20 1052   BP: (!) 80/31  (!) 108/39    Pulse: 115  110    Resp: 24  21 17   Temp:  99.3 °F (37.4 °C)     TempSrc:       SpO2: 94%  94% 98%   Weight:       Height:         I/O last 3 completed shifts: In: 10 [I.V.:10]  Out: 300 [Urine:300]      General:  Awake, alert, not in distress. Appears to be stated age. HEENT: Atraumatic, normocephalic. Anicteric sclera. Pink and moist oral mucosa. Neck supple. No JVD. Chest: Bilateral air entry, clear to auscultation, no wheezing, rhonchi or rales. Cardiovascular: RRR, S1S2, no murmur, rub or gallop. No lower extremity edema. Abdomen: Soft, non tender to palpation. Active bowel sounds x 4 quadrants. Ostomy with light brown, formed stool. Musculoskeletal: Active ROM x 4 extremities. No cyanosis or clubbing. Integumentary: Pink, warm and dry. Free from rash or lesions. Skin turgor normal.  CNS: Oriented to person, place and time. Cranial nerves grossly intact. Speech clear. Face symmetrical. No tremor.      Data:    CBC:   Lab Results   Component Value Date    WBC 4.1 03/12/2020    HGB 10.8 (L) 03/12/2020    HCT 34.3 (L) 03/12/2020    MCV 89.0 03/12/2020     (L) 03/12/2020     BMP:    Lab Results   Component Value Date     03/12/2020     02/25/2020     09/19/2019    K 4.5 03/12/2020    K 4.1 02/25/2020    K 5.1 09/19/2019     03/12/2020     02/25/2020     09/19/2019    CO2 17 (L) 03/12/2020    CO2 20 02/25/2020    CO2 15 (L) 09/19/2019    BUN 58 (H) 03/12/2020    BUN 44 (H) 02/25/2020    BUN 57 (H) 09/19/2019    CREATININE 3.50 (H) 03/12/2020    CREATININE 1.63 (H) 02/25/2020    CREATININE 3.60 (H) 09/19/2019    GLUCOSE 121 (H) 03/12/2020    GLUCOSE 104 (H) 02/25/2020    GLUCOSE 114 (H) 09/19/2019     CMP:   Lab Results   Component Value Date     03/12/2020    K 4.5 03/12/2020     03/12/2020    CO2 17 03/12/2020    BUN 58 03/12/2020    CREATININE 3.50 03/12/2020    GLUCOSE 121 03/12/2020    CALCIUM 8.0 03/12/2020    PROT 6.2 05/25/2019    LABALBU 3.5 05/25/2019    BILITOT 0.74 05/25/2019    ALKPHOS 337 05/25/2019    AST 23 05/25/2019    ALT 12 05/25/2019      Hepatic:   Lab Results   Component Value Date    AST 23 05/25/2019    AST 17 05/24/2019    AST 17 05/23/2019    ALT 12 05/25/2019    ALT 11 05/24/2019    ALT 12 05/23/2019    BILITOT 0.74 05/25/2019    BILITOT 0.70 05/24/2019    BILITOT 0.68 05/23/2019    ALKPHOS 337 (H) 05/25/2019    ALKPHOS 339 (H) 05/24/2019    ALKPHOS 328 (H) 05/23/2019     BNP: No results found for: BNP  Lipids: No results found for: CHOL, HDL  INR:   Lab Results   Component Value Date    INR 1.1 05/21/2019    INR 0.9 05/01/2017    INR 1.0 07/27/2013     PTH: No results found for: PTH  Phosphorus:    Lab Results   Component Value Date    PHOS 3.8 05/25/2019     Ionized Calcium: No results found for: IONCA  Magnesium:   Lab Results   Component Value Date    MG 2.2 05/25/2019     Albumin:   Lab Results   Component Value Date    LABALBU 3.5 05/25/2019     Last 3 CK, CKMB, Troponin: @LABRCNT(CKTOTAL:3,CKMB:3,TROPONINI:3)       URINE:)No results found for: Amy Burgess    Radiology:

## 2020-03-12 NOTE — CARE COORDINATION
ONGOING DISCHARGE PLAN:    Spoke with patient regarding discharge plan and patient confirms that plan is still to have LSW continue to follow for DC to SNF, [address] [place] [address], however, Per Notes, Referral was sent to ARU today. PT/OT on board, will follow rec. Pt. Is POD #2, Rt TKA. Per Nursing, was RAT, last night was placed on 5LNC, now on [address]. On IV Cefepime, & steroids  60 mg Q6, & Levophed. Pulmonary following. Pt will have VNS, [facility], when she returns to home. Will continue to follow for additional discharge needs.     Electronically signed by Vlad Lopez, RN on 3/12/2020 at 12:18 PM

## 2020-03-12 NOTE — CARE COORDINATION
Comprehensive Care for Joint Replacement Ortho Bundle Transition Interview    Patient Interviewed: Informed patient of [facility] Ortho Bundle Program and role of CTS/CTC. CMS Letter Given:  yes    Living Situation:   Living arrangements: alone                   Home: 1-story house/ trailer   Social support:a good social support network    Risk Analysis:  Has the followin%   Chronic Illness: HTN, Obesity and Osteoarthritis    Fall Risk & DME:   Any previous falls or injuries in the home? No   Visual Impairment:  Glasses   Difficulty hearing: wnl    Able to express needs/desires? Yes   Home Equipment: walker, cane, grab bars    Financial Assessment   Afford medications? Yes   Connected with the following services? none    Mode of transportation? car    Health Literacy Assessment   Does anyone help with medications at home? No  Anything preventing from taking medications at home?   No    Anticipated Needs Post Evaluation:  Discharge plan is ECF/ARU    Follow up appointments:    Future Appointments   Date Time Provider Sparkle Magaña   3/26/2020  2:20 PM MD PIPER Cosme [phone]

## 2020-03-12 NOTE — PROGRESS NOTES
RN spoke with Dr Sacha Ferris, new order for transfer to intermediate ICU. House supervisor notified.

## 2020-03-12 NOTE — PROGRESS NOTES
Primary osteoarthritis of left knee 09/17/2019    Urinary tract infection without hematuria 05/24/2019    Acute kidney injury ([facility] 05/23/2019    Anasarca 05/21/2019    Intermittent asthma 05/02/2017    Essential hypertension     SBO (small bowel obstruction) ([facility] [facility] 05/01/2017    Ventral hernia 05/01/2017    Morbid obesity due to excess calories ([facility] [facility] 05/01/2017    Primary cough headache 04/01/2017    Asthmatic bronchitis with acute exacerbation 03/31/2017    Primary osteoarthritis of both knees 03/31/2017        Plan:   1. Agree with cefepime cover in leiu of high lactate  2. NC O2  3. CBC,BMP in am    Electronically signed by Courtney Stauffer MD on 3/12/2020 at 10:18 AM

## 2020-03-12 NOTE — PROGRESS NOTES
Dr Anthony Winn updated that patient had UA culture positive for klebsiella on 2/25. Was ordered cipro and patient took accordingly, however bacteria resistant to cipro. Lab work was assessed by pharmacy and physician was updated that the bacteria was susceptible to merrem. Orders for pharmacy to dose merrem as well as blood cultures.

## 2020-03-12 NOTE — PROGRESS NOTES
Arrives to unit at this time from med surge. Marielos noted on tele. Patient very drowsy, arousable to speech.  Systolic BP's in the 68'E- 500mL bolus finishing from prior to transfer

## 2020-03-12 NOTE — CONSULTS
[address] [facility]                 [address] [facility], [address]                                  CONSULTATION    PATIENT NAME: Miachel Lefort                     :        1955  MED REC NO:   [id number]                              ROOM:       2001  ACCOUNT NO:   [de-identified]                           ADMIT DATE: 03/10/2020  PROVIDER:     Shannen Daley    CONSULT DATE:  2020    CHIEF COMPLAINT:  Hypotension. HISTORY OF PRESENT ILLNESS:  The patient is a 12-year-old female who  underwent a complex total knee arthroplasty on the right knee on 03/10. This was done with supposedly general anesthesia. The patient is  morbidly obese and has a history of degenerative joint disease of the  knees and had undergone a left knee previously. She has a history also of asthmatic bronchitis, was seen by Dr. James Gil  in the hospital in 2017. I am not sure if she follows up. At that time  in 2017, she had a nocturnal oximetry that was negative for any  significant desaturation. Early this morning, she was noted to have decreased pulse oximetry with  saturation around 83%. 2 liters were applied and her saturation did  improve, but she became lethargic. Subsequently, she was given several  breathing treatments and rapid response was called. She was transferred  to the intermediate unit from what the notes say. A chest x-ray that  was done did not show any new infiltrates. The patient continued to be  hypotensive and was started on norepinephrine. Currently, she is alert  and she does respond appropriately to most questions. She denies any  chest pain, worsening cough or sputum production. She denies any  fevers. An arterial blood gas done showed a pH of 7.29 with pCO2 of 36,  pO2 of 69 that was on 3 liters nasal cannula. Her proBNP was mildly  elevated at 858. ALLERGIES:  Reportedly to [address].     PAST MEDICAL HISTORY:  Morbid will put her on albuterol  aerosols every 4 hours. She does not need DuoNeb as she is a nonsmoker. She will need a central line in place and we will go ahead and  empirically start her on steroids. We will go ahead and consult her  nephrologist as well because she has developed acute renal failure. We  will monitor her blood sugars and eventually she will need to be on DVT  prophylaxis when okay with Orthopedic Surgery. Further recommendations  to follow. We will check lactic acid and we will see if she has been  cultured. She will need a Sierra catheter for Is and Os monitoring  closely. Critical care time spent was greater than 30 minutes.         Bradford Ko    D: 03/12/2020 7:52:14       T: 03/12/2020 8:00:56     [email or web address]  Job#: [id number]     Doc#: [id number]    CC:

## 2020-03-13 ENCOUNTER — APPOINTMENT (OUTPATIENT)
Dept: GENERAL RADIOLOGY | Age: 65
DRG: 469 | End: 2020-03-13
Attending: ORTHOPAEDIC SURGERY
Payer: MEDICARE

## 2020-03-13 LAB
ABSOLUTE BANDS #: 5.24 K/UL (ref 0–1)
ABSOLUTE EOS #: 0 K/UL (ref 0–0.4)
ABSOLUTE IMMATURE GRANULOCYTE: ABNORMAL K/UL (ref 0–0.3)
ABSOLUTE LYMPH #: 0.43 K/UL (ref 1–4.8)
ABSOLUTE MONO #: 0.21 K/UL (ref 0.1–1.3)
ALBUMIN SERPL-MCNC: 2.8 G/DL (ref 3.5–5.2)
ALBUMIN/GLOBULIN RATIO: ABNORMAL (ref 1–2.5)
ALP BLD-CCNC: 147 U/L (ref 35–104)
ALT SERPL-CCNC: <5 U/L (ref 5–33)
AMYLASE: 108 U/L (ref 28–100)
ANION GAP SERPL CALCULATED.3IONS-SCNC: 11 MMOL/L (ref 9–17)
ANION GAP SERPL CALCULATED.3IONS-SCNC: 13 MMOL/L (ref 9–17)
AST SERPL-CCNC: 24 U/L
BANDS: 49 % (ref 0–10)
BASOPHILS # BLD: 0 % (ref 0–2)
BASOPHILS ABSOLUTE: 0 K/UL (ref 0–0.2)
BILIRUB SERPL-MCNC: 0.5 MG/DL (ref 0.3–1.2)
BILIRUBIN DIRECT: 0.25 MG/DL
BILIRUBIN, INDIRECT: 0.25 MG/DL (ref 0–1)
BUN BLDV-MCNC: 63 MG/DL (ref 8–23)
BUN BLDV-MCNC: 64 MG/DL (ref 8–23)
BUN/CREAT BLD: ABNORMAL (ref 9–20)
BUN/CREAT BLD: ABNORMAL (ref 9–20)
CALCIUM IONIZED: 1.12 MMOL/L (ref 1.13–1.33)
CALCIUM SERPL-MCNC: 8.2 MG/DL (ref 8.6–10.4)
CALCIUM SERPL-MCNC: 8.2 MG/DL (ref 8.6–10.4)
CHLORIDE BLD-SCNC: 106 MMOL/L (ref 98–107)
CHLORIDE BLD-SCNC: 106 MMOL/L (ref 98–107)
CO2: 19 MMOL/L (ref 20–31)
CO2: 22 MMOL/L (ref 20–31)
CREAT SERPL-MCNC: 1.97 MG/DL (ref 0.5–0.9)
CREAT SERPL-MCNC: 2.49 MG/DL (ref 0.5–0.9)
CULTURE: NO GROWTH
DIFFERENTIAL TYPE: ABNORMAL
EOSINOPHILS RELATIVE PERCENT: 0 % (ref 0–4)
GFR AFRICAN AMERICAN: 23 ML/MIN
GFR AFRICAN AMERICAN: 31 ML/MIN
GFR NON-AFRICAN AMERICAN: 19 ML/MIN
GFR NON-AFRICAN AMERICAN: 25 ML/MIN
GFR SERPL CREATININE-BSD FRML MDRD: ABNORMAL ML/MIN/{1.73_M2}
GLOBULIN: ABNORMAL G/DL (ref 1.5–3.8)
GLUCOSE BLD-MCNC: 129 MG/DL (ref 70–99)
GLUCOSE BLD-MCNC: 137 MG/DL (ref 65–105)
GLUCOSE BLD-MCNC: 146 MG/DL (ref 65–105)
GLUCOSE BLD-MCNC: 150 MG/DL (ref 65–105)
GLUCOSE BLD-MCNC: 153 MG/DL (ref 70–99)
HCT VFR BLD CALC: 29.4 % (ref 36–46)
HEMOGLOBIN: 9.3 G/DL (ref 12–16)
IMMATURE GRANULOCYTES: ABNORMAL %
LACTIC ACID: 1.1 MMOL/L (ref 0.5–2.2)
LIPASE: 4 U/L (ref 13–60)
LYMPHOCYTES # BLD: 4 % (ref 24–44)
Lab: NORMAL
MAGNESIUM: 3.1 MG/DL (ref 1.6–2.6)
MCH RBC QN AUTO: 28.2 PG (ref 26–34)
MCHC RBC AUTO-ENTMCNC: 31.7 G/DL (ref 31–37)
MCV RBC AUTO: 88.8 FL (ref 80–100)
METAMYELOCYTES ABSOLUTE COUNT: 0.11 K/UL
METAMYELOCYTES: 1 %
MONOCYTES # BLD: 2 % (ref 1–7)
MORPHOLOGY: NORMAL
NRBC AUTOMATED: ABNORMAL PER 100 WBC
PDW BLD-RTO: 15.7 % (ref 11.5–14.9)
PHOSPHORUS: 4.8 MG/DL (ref 2.6–4.5)
PLATELET # BLD: 99 K/UL (ref 150–450)
PLATELET ESTIMATE: ABNORMAL
PMV BLD AUTO: 8.9 FL (ref 6–12)
POTASSIUM SERPL-SCNC: 4.4 MMOL/L (ref 3.7–5.3)
POTASSIUM SERPL-SCNC: 4.8 MMOL/L (ref 3.7–5.3)
RBC # BLD: 3.3 M/UL (ref 4–5.2)
RBC # BLD: ABNORMAL 10*6/UL
SEG NEUTROPHILS: 44 % (ref 36–66)
SEGMENTED NEUTROPHILS ABSOLUTE COUNT: 4.71 K/UL (ref 1.3–9.1)
SODIUM BLD-SCNC: 138 MMOL/L (ref 135–144)
SODIUM BLD-SCNC: 139 MMOL/L (ref 135–144)
SPECIMEN DESCRIPTION: NORMAL
TOTAL PROTEIN: 5.4 G/DL (ref 6.4–8.3)
WBC # BLD: 10.7 K/UL (ref 3.5–11)
WBC # BLD: ABNORMAL 10*3/UL

## 2020-03-13 PROCEDURE — 6360000002 HC RX W HCPCS: Performed by: INTERNAL MEDICINE

## 2020-03-13 PROCEDURE — 83605 ASSAY OF LACTIC ACID: CPT

## 2020-03-13 PROCEDURE — 2700000000 HC OXYGEN THERAPY PER DAY

## 2020-03-13 PROCEDURE — 82330 ASSAY OF CALCIUM: CPT

## 2020-03-13 PROCEDURE — 84100 ASSAY OF PHOSPHORUS: CPT

## 2020-03-13 PROCEDURE — 36415 COLL VENOUS BLD VENIPUNCTURE: CPT

## 2020-03-13 PROCEDURE — 82947 ASSAY GLUCOSE BLOOD QUANT: CPT

## 2020-03-13 PROCEDURE — 83690 ASSAY OF LIPASE: CPT

## 2020-03-13 PROCEDURE — 83735 ASSAY OF MAGNESIUM: CPT

## 2020-03-13 PROCEDURE — 85025 COMPLETE CBC W/AUTO DIFF WBC: CPT

## 2020-03-13 PROCEDURE — 80048 BASIC METABOLIC PNL TOTAL CA: CPT

## 2020-03-13 PROCEDURE — 82150 ASSAY OF AMYLASE: CPT

## 2020-03-13 PROCEDURE — 6360000002 HC RX W HCPCS: Performed by: ORTHOPAEDIC SURGERY

## 2020-03-13 PROCEDURE — 2500000003 HC RX 250 WO HCPCS: Performed by: INTERNAL MEDICINE

## 2020-03-13 PROCEDURE — 99024 POSTOP FOLLOW-UP VISIT: CPT | Performed by: ORTHOPAEDIC SURGERY

## 2020-03-13 PROCEDURE — 2000000000 HC ICU R&B

## 2020-03-13 PROCEDURE — 94640 AIRWAY INHALATION TREATMENT: CPT

## 2020-03-13 PROCEDURE — 80076 HEPATIC FUNCTION PANEL: CPT

## 2020-03-13 PROCEDURE — 2580000003 HC RX 258: Performed by: INTERNAL MEDICINE

## 2020-03-13 PROCEDURE — 74018 RADEX ABDOMEN 1 VIEW: CPT

## 2020-03-13 PROCEDURE — 94761 N-INVAS EAR/PLS OXIMETRY MLT: CPT

## 2020-03-13 RX ORDER — METHYLPREDNISOLONE SODIUM SUCCINATE 40 MG/ML
40 INJECTION, POWDER, LYOPHILIZED, FOR SOLUTION INTRAMUSCULAR; INTRAVENOUS EVERY 8 HOURS
Status: DISCONTINUED | OUTPATIENT
Start: 2020-03-13 | End: 2020-03-14

## 2020-03-13 RX ORDER — HEPARIN SODIUM 5000 [USP'U]/ML
5000 INJECTION, SOLUTION INTRAVENOUS; SUBCUTANEOUS EVERY 8 HOURS SCHEDULED
Status: DISCONTINUED | OUTPATIENT
Start: 2020-03-13 | End: 2020-03-17 | Stop reason: HOSPADM

## 2020-03-13 RX ADMIN — HEPARIN SODIUM 5000 UNITS: 5000 INJECTION INTRAVENOUS; SUBCUTANEOUS at 18:05

## 2020-03-13 RX ADMIN — MEROPENEM 500 MG: 500 INJECTION, POWDER, FOR SOLUTION INTRAVENOUS at 06:44

## 2020-03-13 RX ADMIN — METHYLPREDNISOLONE SODIUM SUCCINATE 40 MG: 40 INJECTION, POWDER, FOR SOLUTION INTRAMUSCULAR; INTRAVENOUS at 18:09

## 2020-03-13 RX ADMIN — ALBUTEROL SULFATE 2.5 MG: 2.5 SOLUTION RESPIRATORY (INHALATION) at 11:30

## 2020-03-13 RX ADMIN — SODIUM BICARBONATE: 84 INJECTION, SOLUTION INTRAVENOUS at 05:56

## 2020-03-13 RX ADMIN — ALBUTEROL SULFATE 2.5 MG: 2.5 SOLUTION RESPIRATORY (INHALATION) at 03:39

## 2020-03-13 RX ADMIN — METHYLPREDNISOLONE SODIUM SUCCINATE 60 MG: 125 INJECTION, POWDER, FOR SOLUTION INTRAMUSCULAR; INTRAVENOUS at 02:15

## 2020-03-13 RX ADMIN — METHYLPREDNISOLONE SODIUM SUCCINATE 40 MG: 40 INJECTION, POWDER, FOR SOLUTION INTRAMUSCULAR; INTRAVENOUS at 10:15

## 2020-03-13 RX ADMIN — HEPARIN SODIUM 5000 UNITS: 5000 INJECTION INTRAVENOUS; SUBCUTANEOUS at 10:18

## 2020-03-13 RX ADMIN — ALBUTEROL SULFATE 2.5 MG: 2.5 SOLUTION RESPIRATORY (INHALATION) at 19:03

## 2020-03-13 RX ADMIN — ALBUTEROL SULFATE 2.5 MG: 2.5 SOLUTION RESPIRATORY (INHALATION) at 08:03

## 2020-03-13 RX ADMIN — HEPARIN SODIUM 5000 UNITS: 5000 INJECTION INTRAVENOUS; SUBCUTANEOUS at 22:29

## 2020-03-13 RX ADMIN — SODIUM BICARBONATE: 84 INJECTION, SOLUTION INTRAVENOUS at 22:29

## 2020-03-13 RX ADMIN — ALBUTEROL SULFATE 2.5 MG: 2.5 SOLUTION RESPIRATORY (INHALATION) at 15:21

## 2020-03-13 RX ADMIN — MEROPENEM 500 MG: 500 INJECTION, POWDER, FOR SOLUTION INTRAVENOUS at 18:05

## 2020-03-13 NOTE — PROGRESS NOTES
Pt has very raspy upper airway sounds. She was able to clear this with a cough. Scheduled tx provided.

## 2020-03-13 NOTE — PROGRESS NOTES
Progress Note    Date: 3/13/2020  Time: 6:28 AM    Esdras Hinojosa is a 75 y.o. female     Patient was seen and examined. She complained of nothing this morning. Pain is controlled, she states it only hurts when she coughs. She is urinating well . Her stoma site has been draining soft brown stool. She denies Fever/Chills, Chest Pain, SOB, N/V, Calf Pain    Vitals:  Vitals:    03/13/20 0530 03/13/20 0545 03/13/20 0600 03/13/20 0615   BP: (!) 108/45 (!) 113/52 (!) 108/55 (!) 98/54   Pulse: 91 90 92 88   Resp: 17 20 21 22   Temp:       TempSrc:       SpO2: 95% 97% 97% 97%   Weight:       Height:           Intake/Output:   Last Shift: I/O last 3 completed shifts: In: 2280.7 [I.V.:1130.7; IV Piggyback:1150]  Out: 850 [Urine:850]  Current Shift: I/O this shift:  In: 1914.2 [I.V.:1814.2; IV Piggyback:100]  Out: 1150 [Urine:1000; Emesis/NG output:150]    Physical Exam:  General:  no apparent distress, alert and cooperative  Neurologic:  alert, oriented, normal speech, no focal findings or movement disorder noted  Lungs:  No increased work of breathing, good air exchange, clear to auscultation bilaterally, no crackles or wheezing  Heart:  regular rate and rhythm    Abdomen: Abdomen soft, non-tender.  BS normal, stoma site is C/D/I  Extremities:  no calf tenderness, non edematous    Lab:  Complete Blood Count:   Recent Labs     03/12/20  0300 03/13/20  0430   WBC 4.1 10.7   HGB 10.8* 9.3*   HCT 34.3* 29.4*   * 99*        PT/INR:    Lab Results   Component Value Date    PROTIME 14.6 05/21/2019    INR 1.1 05/21/2019     PTT:    Lab Results   Component Value Date    APTT 31.6 05/21/2019       Comprehensive Metabolic Profile:   Recent Labs     03/12/20  0300 03/12/20  1226 03/13/20  0430    139 138   K 4.5 4.9 4.8    108* 106   CO2 17* 17* 19*   BUN 58* 62* 64*   CREATININE 3.50* 3.40* 2.49*   GLUCOSE 121* 152* 153*   CALCIUM 8.0* 7.8* 8.2*        Assessment/Plan:  Esdras Hinojosa 75 y.o. female    - Doing well, vitals stable   - continue regan catheter, UOP adequate   - NG tube has 150 ml overnight   - KUB pending this am    - Continue care, please page with any questions      Patient Active Problem List    Diagnosis Date Noted    Postprocedural hypotension 03/12/2020    Acute kidney injury (MIGUE) with acute tubular necrosis (ATN) ([facility] [address] 03/12/2020    Arthritis of both knees 03/12/2020    Chronic obstructive pulmonary disease ([address] 03/12/2020    Colostomy present ([address] 03/12/2020    Disability of walking 03/12/2020    Lymphedema 03/12/2020    Obstructive sleep apnea syndrome 03/12/2020    Severe depression ([address] 03/12/2020    Stage 2 chronic kidney disease 03/12/2020    Morbid obesity with BMI of 50.0-59.9, adult ([address]     Status post total left knee replacement 11/14/2019    Primary osteoarthritis of right knee 11/14/2019    Open wound of left knee 10/30/2019    Delayed surgical wound healing 10/30/2019    Hyperkalemia 09/19/2019    Acute gastritis without hemorrhage 09/19/2019    Primary osteoarthritis of left knee 09/17/2019    Urinary tract infection without hematuria 05/24/2019    Acute kidney injury ([address] 05/23/2019    Anasarca 05/21/2019    Intermittent asthma 05/02/2017    Essential hypertension     SBO (small bowel obstruction) ([address] 05/01/2017    Ventral hernia 05/01/2017    Primary cough headache 04/01/2017    Asthmatic bronchitis with acute exacerbation 03/31/2017    Primary osteoarthritis of both knees 03/31/2017         Lima Raymond DO  Ob/Gyn Resident  Pager: [phone]  3/13/2020, 6:28 AM  Attending Physician Statement  I have discussed the care of Jose Martin Bravo, including pertinent history and exam findings with the resident/fellow. I have reviewed the key elements of all parts of the encounter with the resident/fellow. I have seen and examined the patient with the resident/fellow. I agree with the assessment and plan and status of the problem list as documented. Additionally I recommend serial KUB and close follow up. No abdominal pain today.   Electronically signed by Johny Pollard MD on 3/13/2020 at 2:14 PM

## 2020-03-13 NOTE — PROGRESS NOTES
quadrants. Musculoskeletal: Active ROM x 4 extremities. No cyanosis or clubbing. Integumentary: Pink, warm and dry. Free from rash or lesions. Skin turgor normal.  CNS: Oriented to person, place and time. Speech clear. Face symmetrical. No tremor. Data:  CBC:   Lab Results   Component Value Date    WBC 10.7 03/13/2020    HGB 9.3 (L) 03/13/2020    HCT 29.4 (L) 03/13/2020    MCV 88.8 03/13/2020    PLT 99 (L) 03/13/2020     BMP:    Lab Results   Component Value Date     03/13/2020    K 4.8 03/13/2020     03/13/2020    CO2 19 (L) 03/13/2020    BUN 64 (H) 03/13/2020    CREATININE 2.49 (H) 03/13/2020    GLUCOSE 153 (H) 03/13/2020     CMP:   Lab Results   Component Value Date     03/13/2020    K 4.8 03/13/2020     03/13/2020    CO2 19 (L) 03/13/2020    BUN 64 (H) 03/13/2020    CREATININE 2.49 (H) 03/13/2020    GLUCOSE 153 (H) 03/13/2020    CALCIUM 8.2 (L) 03/13/2020    PROT 5.4 (L) 03/13/2020    LABALBU 2.8 (L) 03/13/2020    BILITOT 0.50 03/13/2020    ALKPHOS 147 (H) 03/13/2020    AST 24 03/13/2020    ALT <5 (L) 03/13/2020      Hepatic:   Lab Results   Component Value Date    AST 24 03/13/2020    ALT <5 (L) 03/13/2020    BILITOT 0.50 03/13/2020    ALKPHOS 147 (H) 03/13/2020     BNP: No results found for: BNP  Lipids: No results found for: CHOL, HDL  INR:   Lab Results   Component Value Date    INR 1.1 05/21/2019     PTH: No results found for: PTH  Phosphorus:    Lab Results   Component Value Date    PHOS 4.8 (H) 03/13/2020     Ionized Calcium: No results found for: IONCA  Magnesium:   Lab Results   Component Value Date    MG 3.1 (H) 03/13/2020     Albumin:   Lab Results   Component Value Date    LABALBU 2.8 (L) 03/13/2020     Last 3 CK, CKMB, Troponin: @LABRCNT(CKTOTAL:3,CKMB:3,TROPONINI:3)       URINE:)No results found for: Pavithra Tang    Radiology:   Reviewed. Assessment:  1. Acute Kidney Injury-likely prerenal. FeNa was 0.9% without urine eosinophils. US showed no hydronephrosis.  Cr is

## 2020-03-13 NOTE — PROGRESS NOTES
Progress Note    3/13/2020 1:21 PM  Subjective: Interval History: Abdomen is soft wtth RLQ tenderness;some stool in colostomy bag;making urine well and serum creatinine is down significantly    Diet: Diet NPO Effective Now Exceptions are: Other (See Comment)    Medications:   Reviewed medications    Labs:   CBC:   Recent Labs     03/13/20  0430   WBC 10.7   HGB 9.3*   PLT 99*     BMP:    Recent Labs     03/13/20  0430      K 4.8      CO2 19*   BUN 64*   CREATININE 2.49*   GLUCOSE 153*     Hepatic:   Recent Labs     03/13/20  0740   AST 24   ALT <5*   BILITOT 0.50   ALKPHOS 147*     Troponin: No results for input(s): TROPONINI in the last 72 hours. BNP: No results for input(s): BNP in the last 72 hours. Lipids: No results for input(s): CHOL, HDL in the last 72 hours. Invalid input(s): LDLCALCU  INR: No results for input(s): INR in the last 72 hours.       Objective:   Vitals: BP (!) 103/56   Pulse 98   Temp 98.4 °F (36.9 °C)   Resp 17   Ht 5' 5\" (1.651 m)   Wt (!) 320 lb (145.2 kg)   SpO2 96%   BMI 53.25 kg/m²   General appearance: alert and cooperative with exam  Neck: no adenopathy, no carotid bruit, no JVD, supple, symmetrical, trachea midline and thyroid not enlarged, symmetric, no tenderness/mass/nodules  Lungs: clear to auscultation bilaterally  Heart: regular rate and rhythm, S1, S2 normal, no murmur, click, rub or gallop  Abdomen: soft, non-tender; bowel sounds normal; no masses,  no organomegaly  Extremities: extremities normal, atraumatic, no cyanosis or edema  Neurologic: Mental status: Alert, oriented, thought content appropriate    Assessment:     Patient Active Problem List    Diagnosis Date Noted    Postprocedural hypotension 03/12/2020    Acute kidney injury (MIGUE) with acute tubular necrosis (ATN) ([facility] [address] 03/12/2020    Arthritis of both knees 03/12/2020    Chronic obstructive pulmonary disease ([facility] [address] 03/12/2020    Colostomy present ([facility] [address] 03/12/2020    Disability of walking 03/12/2020    Lymphedema 03/12/2020    Obstructive sleep apnea syndrome 03/12/2020    Severe depression ([facility] [address] 03/12/2020    Stage 2 chronic kidney disease 03/12/2020    Morbid obesity with BMI of 50.0-59.9, adult ([facility] [facility]     Status post total left knee replacement 11/14/2019    Primary osteoarthritis of right knee 11/14/2019    Open wound of left knee 10/30/2019    Delayed surgical wound healing 10/30/2019    Hyperkalemia 09/19/2019    Acute gastritis without hemorrhage 09/19/2019    Primary osteoarthritis of left knee 09/17/2019    Urinary tract infection without hematuria 05/24/2019    Acute kidney injury ([facility] [address] 05/23/2019    Anasarca 05/21/2019    Intermittent asthma 05/02/2017    Essential hypertension     SBO (small bowel obstruction) ([facility] [address] 05/01/2017    Ventral hernia 05/01/2017    Primary cough headache 04/01/2017    Asthmatic bronchitis with acute exacerbation 03/31/2017    Primary osteoarthritis of both knees 03/31/2017     Large bowel obstruction     Plan:   1.  Continue current monitoring    Electronically signed by Eugene Rodriguez MD on 3/13/2020 at 1:21 PM

## 2020-03-13 NOTE — PROGRESS NOTES
ICU Progress Note (Non-Vent)  [facility]    Patient - Alexai Goodwin,  Age - 75 y.o.    - 1955      Room Number -    MRN -  [id number]   Acct # - [de-identified]  Date of Admission -  3/10/2020  9:50 AM    Events of Past 24 Hours   Much more alert, oriented says she has an occasional cough. Norepinephrine down to 2 mics per minute    Vitals    height is 5' 5\" (1.651 m) and weight is 320 lb (145.2 kg) (abnormal). Her oral temperature is 99.1 °F (37.3 °C). Her blood pressure is 108/45 (abnormal) and her pulse is 86. Her respiration is 17 and oxygen saturation is 96%.        Temperature Range: Temp: 99.1 °F (37.3 °C) Temp  Av.7 °F (37.1 °C)  Min: 97.3 °F (36.3 °C)  Max: 99.3 °F (37.4 °C)  BP Range:  Systolic (75BND), LRK:815 , Min:68 , XMR:784     Diastolic (24KYX), EEM:74, Min:24, Max:75    Pulse Range: Pulse  Av.5  Min: 72  Max: 115  Respiration Range: Resp  Av.8  Min: 13  Max: 29  Current Pulse Ox[de-identified]  SpO2: 96 %  24HR Pulse Ox Range:  SpO2  Av.1 %  Min: 94 %  Max: 100 %  Oxygen Amount and Delivery: O2 Flow Rate (L/min): 1.5 L/min    Wt Readings from Last 3 Encounters:   03/10/20 (!) 320 lb (145.2 kg)   20 (!) 320 lb (145.2 kg)   19 (!) 330 lb 0.5 oz (149.7 kg)     I/O       Intake/Output Summary (Last 24 hours) at 3/13/2020 0715  Last data filed at 3/13/2020 0527  Gross per 24 hour   Intake 4194.88 ml   Output 2000 ml   Net 2194.88 ml     DRAIN/TUBE OUTPUT       Invasive Lines   ICP PRESSURE RANGE  No data recorded  CVP PRESSURE RANGE  No data recorded      Medications      meropenem  500 mg Intravenous Q12H    albuterol  2.5 mg Nebulization Q4H    methylPREDNISolone  60 mg Intravenous Q6H    insulin lispro  0-12 Units Subcutaneous TID WC    insulin lispro  0-6 Units Subcutaneous Nightly    sodium chloride flush  10 mL Intravenous 2 times per day    acetaminophen 6.2 05/25/2019    BILITOT 0.74 05/25/2019    BILIDIR 0.22 05/03/2017    IBILI 0.82 05/03/2017    LABALBU 3.5 05/25/2019       ABG ABGs:   Lab Results   Component Value Date    PHART 7.291 03/12/2020    PO2ART 68.7 03/12/2020    WXG3PEH 36.4 03/12/2020       Lab Results   Component Value Date    MODE NOT REPORTED 03/12/2020         INR  No results for input(s): PROTIME, INR in the last 72 hours. APTT  No results for input(s): APTT in the last 72 hours. Lactic Acid  Lab Results   Component Value Date    LACTA 2.5 03/12/2020    LACTA 2.5 03/12/2020    LACTA 2.3 03/12/2020        BNP   No results for input(s): BNP in the last 72 hours. Cultures   Cultures are pending. Patient had ESBL Klebsiella pneumonia that was resistant to Cipro which was what she was treated with on 2/25    Radiology       CT Scans    I was called with the results of the CT scan of the abdomen which showed possible strangulated hernia    Shows normal LV function no elevation of RVSP    Day #2 right IJ    SYSTEMS ASSESSMENT    Septic shock  History of ESBL Klebsiella UTI  History of asthma  Acute on chronic renal failure  Abdominal hernia  Obesity status post gastric bypass surgery  Possible RACHEL    Neuro   Not very lethargic at all today, seems to understand everything I am saying and answers all questions appropriately    Respiratory   Wean oxygen as tolerated.  Keep O2 sat > 88%  Follow-up chest x-ray tomorrow   Not bronchospastic, wean Solu-Medrol  Continue aerosol treatments      Cardiovascular   Hemodynamics are improving but still on norepinephrine  Appears to have good cardiac function     Gastrointestinal   Currently n.p.o. and has an NG tube in place  Surgery on board but belly feels benign  Check LFTs as well as amylase and lipase  Renal   Creatinine improving with IV fluid hydration  Bicarb also increased due to being on bicarb drip    Infectious Disease   Antibiotics switched to [address] [place] [address] which will cover ESBL UTI  Will also cover

## 2020-03-13 NOTE — PROGRESS NOTES
Notified [facility] KRANTHI, that ARU can follow pt's progress in therapies. Notified by Kenny Valdez that pt has changed her mind and is now requesting SNF closer to her home.

## 2020-03-13 NOTE — PROGRESS NOTES
Pharmacy contacted regarding viability of Arixtra as DVT prophylaxis considering patient's current  renal function. Pharmacist to verify.

## 2020-03-13 NOTE — PROGRESS NOTES
Physical Therapy  DATE: 3/13/2020    NAME: Geovanni Dugan  MRN: [id number]   : 1955    Patient not seen this date for Physical Therapy due to:  [] Blood transfusion in progress  [x] Cancel by RN: Maxime Chambers RN, await surgery clearance before therapy. Will check back in PM.  [] Hemodialysis  []  Refusal by Patient   [] Spine Precautions   [] Strict Bedrest  [] Surgery  [] Testing      [] Other        [] PT being discontinued at this time. Patient independent. No further needs. [] PT being discontinued at this time as the patient has been transferred to hospice care. No further needs.     Shaun Willoughby, PTA

## 2020-03-14 ENCOUNTER — APPOINTMENT (OUTPATIENT)
Dept: GENERAL RADIOLOGY | Age: 65
DRG: 469 | End: 2020-03-14
Attending: ORTHOPAEDIC SURGERY
Payer: MEDICARE

## 2020-03-14 LAB
ABSOLUTE BANDS #: 1.53 K/UL (ref 0–1)
ABSOLUTE EOS #: 0 K/UL (ref 0–0.4)
ABSOLUTE IMMATURE GRANULOCYTE: ABNORMAL K/UL (ref 0–0.3)
ABSOLUTE LYMPH #: 0.31 K/UL (ref 1–4.8)
ABSOLUTE MONO #: 0.31 K/UL (ref 0.1–1.3)
ANION GAP SERPL CALCULATED.3IONS-SCNC: 13 MMOL/L (ref 9–17)
BANDS: 15 % (ref 0–10)
BASOPHILS # BLD: 0 % (ref 0–2)
BASOPHILS ABSOLUTE: 0 K/UL (ref 0–0.2)
BUN BLDV-MCNC: 58 MG/DL (ref 8–23)
BUN/CREAT BLD: ABNORMAL (ref 9–20)
CALCIUM SERPL-MCNC: 8.2 MG/DL (ref 8.6–10.4)
CHLORIDE BLD-SCNC: 105 MMOL/L (ref 98–107)
CO2: 22 MMOL/L (ref 20–31)
CREAT SERPL-MCNC: 1.68 MG/DL (ref 0.5–0.9)
CULTURE: ABNORMAL
DIFFERENTIAL TYPE: ABNORMAL
EOSINOPHILS RELATIVE PERCENT: 0 % (ref 0–4)
GFR AFRICAN AMERICAN: 37 ML/MIN
GFR NON-AFRICAN AMERICAN: 30 ML/MIN
GFR SERPL CREATININE-BSD FRML MDRD: ABNORMAL ML/MIN/{1.73_M2}
GFR SERPL CREATININE-BSD FRML MDRD: ABNORMAL ML/MIN/{1.73_M2}
GLUCOSE BLD-MCNC: 105 MG/DL (ref 65–105)
GLUCOSE BLD-MCNC: 112 MG/DL (ref 65–105)
GLUCOSE BLD-MCNC: 113 MG/DL (ref 65–105)
GLUCOSE BLD-MCNC: 123 MG/DL (ref 70–99)
GLUCOSE BLD-MCNC: 98 MG/DL (ref 65–105)
HCT VFR BLD CALC: 27.7 % (ref 36–46)
HEMOGLOBIN: 9 G/DL (ref 12–16)
IMMATURE GRANULOCYTES: ABNORMAL %
LYMPHOCYTES # BLD: 3 % (ref 24–44)
Lab: ABNORMAL
MAGNESIUM: 2.9 MG/DL (ref 1.6–2.6)
MCH RBC QN AUTO: 28.2 PG (ref 26–34)
MCHC RBC AUTO-ENTMCNC: 32.3 G/DL (ref 31–37)
MCV RBC AUTO: 87.3 FL (ref 80–100)
METAMYELOCYTES ABSOLUTE COUNT: 0.1 K/UL
METAMYELOCYTES: 1 %
MONOCYTES # BLD: 3 % (ref 1–7)
MORPHOLOGY: ABNORMAL
MORPHOLOGY: ABNORMAL
NRBC AUTOMATED: ABNORMAL PER 100 WBC
PDW BLD-RTO: 15.4 % (ref 11.5–14.9)
PHOSPHORUS: 4.1 MG/DL (ref 2.6–4.5)
PLATELET # BLD: 127 K/UL (ref 150–450)
PLATELET ESTIMATE: ABNORMAL
PMV BLD AUTO: 8.7 FL (ref 6–12)
POTASSIUM SERPL-SCNC: 4.2 MMOL/L (ref 3.7–5.3)
RBC # BLD: 3.17 M/UL (ref 4–5.2)
RBC # BLD: ABNORMAL 10*6/UL
SEG NEUTROPHILS: 78 % (ref 36–66)
SEGMENTED NEUTROPHILS ABSOLUTE COUNT: 7.95 K/UL (ref 1.3–9.1)
SODIUM BLD-SCNC: 140 MMOL/L (ref 135–144)
SPECIMEN DESCRIPTION: ABNORMAL
TRIGL SERPL-MCNC: 75 MG/DL
WBC # BLD: 10.2 K/UL (ref 3.5–11)
WBC # BLD: ABNORMAL 10*3/UL

## 2020-03-14 PROCEDURE — 6360000002 HC RX W HCPCS: Performed by: INTERNAL MEDICINE

## 2020-03-14 PROCEDURE — C9113 INJ PANTOPRAZOLE SODIUM, VIA: HCPCS | Performed by: FAMILY MEDICINE

## 2020-03-14 PROCEDURE — 94761 N-INVAS EAR/PLS OXIMETRY MLT: CPT

## 2020-03-14 PROCEDURE — 85025 COMPLETE CBC W/AUTO DIFF WBC: CPT

## 2020-03-14 PROCEDURE — 36415 COLL VENOUS BLD VENIPUNCTURE: CPT

## 2020-03-14 PROCEDURE — 99024 POSTOP FOLLOW-UP VISIT: CPT | Performed by: ORTHOPAEDIC SURGERY

## 2020-03-14 PROCEDURE — 71045 X-RAY EXAM CHEST 1 VIEW: CPT

## 2020-03-14 PROCEDURE — 87040 BLOOD CULTURE FOR BACTERIA: CPT

## 2020-03-14 PROCEDURE — 2580000003 HC RX 258: Performed by: INTERNAL MEDICINE

## 2020-03-14 PROCEDURE — 80048 BASIC METABOLIC PNL TOTAL CA: CPT

## 2020-03-14 PROCEDURE — 6360000002 HC RX W HCPCS: Performed by: ORTHOPAEDIC SURGERY

## 2020-03-14 PROCEDURE — 6370000000 HC RX 637 (ALT 250 FOR IP): Performed by: ORTHOPAEDIC SURGERY

## 2020-03-14 PROCEDURE — 6360000002 HC RX W HCPCS: Performed by: FAMILY MEDICINE

## 2020-03-14 PROCEDURE — 2580000003 HC RX 258: Performed by: ORTHOPAEDIC SURGERY

## 2020-03-14 PROCEDURE — 97110 THERAPEUTIC EXERCISES: CPT

## 2020-03-14 PROCEDURE — 82947 ASSAY GLUCOSE BLOOD QUANT: CPT

## 2020-03-14 PROCEDURE — 2060000000 HC ICU INTERMEDIATE R&B

## 2020-03-14 PROCEDURE — 2500000003 HC RX 250 WO HCPCS: Performed by: SURGERY

## 2020-03-14 PROCEDURE — 2580000003 HC RX 258: Performed by: FAMILY MEDICINE

## 2020-03-14 PROCEDURE — 94640 AIRWAY INHALATION TREATMENT: CPT

## 2020-03-14 PROCEDURE — 2700000000 HC OXYGEN THERAPY PER DAY

## 2020-03-14 PROCEDURE — 84100 ASSAY OF PHOSPHORUS: CPT

## 2020-03-14 PROCEDURE — 97530 THERAPEUTIC ACTIVITIES: CPT

## 2020-03-14 PROCEDURE — 83735 ASSAY OF MAGNESIUM: CPT

## 2020-03-14 PROCEDURE — 74018 RADEX ABDOMEN 1 VIEW: CPT

## 2020-03-14 PROCEDURE — 84478 ASSAY OF TRIGLYCERIDES: CPT

## 2020-03-14 RX ORDER — SODIUM CHLORIDE 9 MG/ML
10 INJECTION INTRAVENOUS DAILY
Status: DISCONTINUED | OUTPATIENT
Start: 2020-03-14 | End: 2020-03-17 | Stop reason: HOSPADM

## 2020-03-14 RX ORDER — PANTOPRAZOLE SODIUM 40 MG/10ML
40 INJECTION, POWDER, LYOPHILIZED, FOR SOLUTION INTRAVENOUS DAILY
Status: DISCONTINUED | OUTPATIENT
Start: 2020-03-14 | End: 2020-03-17 | Stop reason: HOSPADM

## 2020-03-14 RX ORDER — METHYLPREDNISOLONE SODIUM SUCCINATE 40 MG/ML
30 INJECTION, POWDER, LYOPHILIZED, FOR SOLUTION INTRAMUSCULAR; INTRAVENOUS EVERY 12 HOURS
Status: DISCONTINUED | OUTPATIENT
Start: 2020-03-14 | End: 2020-03-15

## 2020-03-14 RX ADMIN — METHYLPREDNISOLONE SODIUM SUCCINATE 30 MG: 40 INJECTION, POWDER, FOR SOLUTION INTRAMUSCULAR; INTRAVENOUS at 22:58

## 2020-03-14 RX ADMIN — SENNOSIDES AND DOCUSATE SODIUM 1 TABLET: 8.6; 5 TABLET ORAL at 21:13

## 2020-03-14 RX ADMIN — MEROPENEM 500 MG: 500 INJECTION, POWDER, FOR SOLUTION INTRAVENOUS at 04:45

## 2020-03-14 RX ADMIN — PANTOPRAZOLE SODIUM 40 MG: 40 INJECTION, POWDER, FOR SOLUTION INTRAVENOUS at 20:01

## 2020-03-14 RX ADMIN — METHYLPREDNISOLONE SODIUM SUCCINATE 40 MG: 40 INJECTION, POWDER, FOR SOLUTION INTRAMUSCULAR; INTRAVENOUS at 02:31

## 2020-03-14 RX ADMIN — SODIUM CHLORIDE 10 ML: 9 INJECTION INTRAMUSCULAR; INTRAVENOUS; SUBCUTANEOUS at 20:07

## 2020-03-14 RX ADMIN — ALBUTEROL SULFATE 2.5 MG: 2.5 SOLUTION RESPIRATORY (INHALATION) at 03:42

## 2020-03-14 RX ADMIN — ALBUTEROL SULFATE 2.5 MG: 2.5 SOLUTION RESPIRATORY (INHALATION) at 18:55

## 2020-03-14 RX ADMIN — ALBUTEROL SULFATE 2.5 MG: 2.5 SOLUTION RESPIRATORY (INHALATION) at 00:34

## 2020-03-14 RX ADMIN — HEPARIN SODIUM 5000 UNITS: 5000 INJECTION INTRAVENOUS; SUBCUTANEOUS at 17:21

## 2020-03-14 RX ADMIN — HEPARIN SODIUM 5000 UNITS: 5000 INJECTION INTRAVENOUS; SUBCUTANEOUS at 22:58

## 2020-03-14 RX ADMIN — ALBUTEROL SULFATE 2.5 MG: 2.5 SOLUTION RESPIRATORY (INHALATION) at 16:11

## 2020-03-14 RX ADMIN — MEROPENEM 1 G: 1 INJECTION, POWDER, FOR SOLUTION INTRAVENOUS at 22:58

## 2020-03-14 RX ADMIN — I.V. FAT EMULSION 250 ML: 20 EMULSION INTRAVENOUS at 17:21

## 2020-03-14 RX ADMIN — Medication 10 ML: at 20:02

## 2020-03-14 RX ADMIN — CALCIUM GLUCONATE: 94 INJECTION, SOLUTION INTRAVENOUS at 17:22

## 2020-03-14 RX ADMIN — ALBUTEROL SULFATE 2.5 MG: 2.5 SOLUTION RESPIRATORY (INHALATION) at 10:40

## 2020-03-14 RX ADMIN — ALBUTEROL SULFATE 2.5 MG: 2.5 SOLUTION RESPIRATORY (INHALATION) at 07:01

## 2020-03-14 RX ADMIN — MEROPENEM 1 G: 1 INJECTION, POWDER, FOR SOLUTION INTRAVENOUS at 17:21

## 2020-03-14 RX ADMIN — ONDANSETRON 4 MG: 2 INJECTION INTRAMUSCULAR; INTRAVENOUS at 20:01

## 2020-03-14 ASSESSMENT — PAIN DESCRIPTION - ORIENTATION: ORIENTATION: RIGHT

## 2020-03-14 ASSESSMENT — PAIN SCALES - GENERAL
PAINLEVEL_OUTOF10: 8
PAINLEVEL_OUTOF10: 0

## 2020-03-14 ASSESSMENT — PAIN DESCRIPTION - LOCATION: LOCATION: KNEE

## 2020-03-14 ASSESSMENT — PAIN DESCRIPTION - PAIN TYPE: TYPE: SURGICAL PAIN

## 2020-03-14 ASSESSMENT — PAIN DESCRIPTION - DESCRIPTORS: DESCRIPTORS: SHARP;SHOOTING

## 2020-03-14 ASSESSMENT — PAIN - FUNCTIONAL ASSESSMENT: PAIN_FUNCTIONAL_ASSESSMENT: PREVENTS OR INTERFERES SOME ACTIVE ACTIVITIES AND ADLS

## 2020-03-14 ASSESSMENT — PAIN DESCRIPTION - FREQUENCY: FREQUENCY: INTERMITTENT

## 2020-03-14 NOTE — PROGRESS NOTES
Pt continues to present with noisy upper airway sounds. Scheduled tx provided. Pt reports she has been coughing frequently.

## 2020-03-14 NOTE — PROGRESS NOTES
General Surgery Progress Note            PATIENT NAME: Graham eB     TODAY'S DATE: 3/14/2020, 9:54 AM    SUBJECTIVE:    Pt seen and examined. No new complaints. Overall feels better. Some gas and stool in the ostomy bag. NG tube bilious output. Discussed with nursing staff. Blood work noted. OBJECTIVE:   VITALS:  /63   Pulse 75   Temp 98.1 °F (36.7 °C) (Axillary)   Resp 11   Ht 5' 5\" (1.651 m)   Wt (!) 349 lb 3.3 oz (158.4 kg)   SpO2 94%   BMI 58.11 kg/m²      INTAKE/OUTPUT:      Intake/Output Summary (Last 24 hours) at 3/14/2020 0954  Last data filed at 3/14/2020 0400  Gross per 24 hour   Intake 2666.17 ml   Output 3350 ml   Net -683.83 ml                 CONSTITUTIONAL:  awake and alert. No acute distress  HEART:   Regular  LUNGS:   Decreased air entry at bases  ABDOMEN:   Soft. Morbidly obese. Stable peristomal hernia. Some gas and stool in the ostomy. No significant tenderness. Nothing acute. EXTREMITIES:   Nontender. Data:  CBC with Differential:    Lab Results   Component Value Date    WBC 10.2 03/14/2020    RBC 3.17 03/14/2020    HGB 9.0 03/14/2020    HCT 27.7 03/14/2020     03/14/2020    MCV 87.3 03/14/2020    MCH 28.2 03/14/2020    MCHC 32.3 03/14/2020    RDW 15.4 03/14/2020    METASPCT 1 03/14/2020    LYMPHOPCT 3 03/14/2020    MONOPCT 3 03/14/2020    BASOPCT 0 03/14/2020    MONOSABS 0.31 03/14/2020    LYMPHSABS 0.31 03/14/2020    EOSABS 0.00 03/14/2020    BASOSABS 0.00 03/14/2020    DIFFTYPE NOT REPORTED 03/14/2020     BMP:    Lab Results   Component Value Date     03/14/2020    K 4.2 03/14/2020     03/14/2020    CO2 22 03/14/2020    BUN 58 03/14/2020    LABALBU 2.8 03/13/2020    CREATININE 1.68 03/14/2020    CALCIUM 8.2 03/14/2020    GFRAA 37 03/14/2020    LABGLOM 30 03/14/2020    GLUCOSE 123 03/14/2020       Radiology Review: KUB pending.       ASSESSMENT     Principal Problem:    Primary osteoarthritis of right knee  Active Problems: Asthmatic bronchitis with acute exacerbation    Primary osteoarthritis of both knees    Ventral hernia    Essential hypertension    Intermittent asthma    Postprocedural hypotension    Acute kidney injury (MIGUE) with acute tubular necrosis (ATN) (HCC)    Morbid obesity with BMI of 50.0-59.9, adult (HCC)  Resolved Problems:    * No resolved hospital problems. *      Plan  1. Check KUB. Dietitian to start TPN. Surgically stable for transfer to stepdown unit. Discussed with patient and family at length. Covering for primary surgeon.       Electronically signed by Alexia Goodwin MD  on 3/14/2020 at 9:54 Batsheva Watkins

## 2020-03-14 NOTE — PLAN OF CARE
Problem: Pain:  Goal: Pain level will decrease  Description: Pain level will decrease  Outcome: Ongoing     Problem: Pain:  Goal: Control of acute pain  Description: Control of acute pain  Outcome: Ongoing     Problem: Pain:  Goal: Control of chronic pain  Description: Control of chronic pain  Outcome: Ongoing     Problem: Musculor/Skeletal Functional Status  Goal: Highest potential functional level  Outcome: Ongoing     Problem: Musculor/Skeletal Functional Status  Goal: Absence of falls  Outcome: Ongoing     Problem: Falls - Risk of:  Goal: Will remain free from falls  Description: Will remain free from falls  Outcome: Ongoing     Problem: Falls - Risk of:  Goal: Absence of physical injury  Description: Absence of physical injury  Outcome: Ongoing     Problem: Gas Exchange - Impaired:  Goal: Levels of oxygenation will improve  Description: Levels of oxygenation will improve  Outcome: Ongoing     Problem: Skin Integrity - Impaired:  Goal: Will show no infection signs and symptoms  Description: Will show no infection signs and symptoms  Outcome: Ongoing     Problem: Skin Integrity - Impaired:  Goal: Absence of new skin breakdown  Description: Absence of new skin breakdown  Outcome: Ongoing

## 2020-03-14 NOTE — PROGRESS NOTES
Pulmonary Progress Note  [facility]      Patient - Brock Beth,  Age - 75 y.o.    - 1955      Room Number -    MRN -  [id number]   Acct # - [de-identified]  Date of Admission -  3/10/2020  9:50 AM    Consulting Stas Rock MD  Primary Care Physician - Neil Arzate MD     SUBJECTIVE   Patient appears to be in decent spirits. Daughter at bedside. Denies any increased shortness of breath or chest pain. OBJECTIVE   VITALS    height is 5' 5\" (1.651 m) and weight is 349 lb 3.3 oz (158.4 kg) (abnormal). Her axillary temperature is 98.5 °F (36.9 °C). Her blood pressure is 125/84 and her pulse is 83. Her respiration is 13 and oxygen saturation is 98%. Body mass index is 58.11 kg/m². Temperature Range: Temp: 98.5 °F (36.9 °C) Temp  Av.3 °F (36.8 °C)  Min: 97.9 °F (36.6 °C)  Max: 98.7 °F (37.1 °C)  BP Range:  Systolic (96POI), ZQM:920 , Min:91 , MZT:412     Diastolic (50YHP), JRS:42, Min:39, Max:84    Pulse Range: Pulse  Av.5  Min: 75  Max: 101  Respiration Range: Resp  Av.6  Min: 11  Max: 29  Current Pulse Ox[de-identified]  SpO2: 98 %  24HR Pulse Ox Range:  SpO2  Av.2 %  Min: 89 %  Max: 99 %  Oxygen Amount and Delivery: O2 Flow Rate (L/min): 1.5 L/min    Wt Readings from Last 3 Encounters:   20 (!) 349 lb 3.3 oz (158.4 kg)   20 (!) 320 lb (145.2 kg)   19 (!) 330 lb 0.5 oz (149.7 kg)       I/O (24 Hours)    Intake/Output Summary (Last 24 hours) at 3/14/2020 1142  Last data filed at 3/14/2020 0400  Gross per 24 hour   Intake 2666.17 ml   Output 3350 ml   Net -683.83 ml       EXAM     General Appearance  Awake, alert, oriented, in no acute distress  HEENT - normocephalic, atraumatic. Neck - Supple,  trachea midline   Lungs -coarse breath sounds anterior laterally  Heart Exam:PMI normal. No lifts, heaves, or thrills. RRR. No murmurs, clicks, gallops, or rubs  Abdomen Exam: Abdomen soft, non-tender.  BS

## 2020-03-14 NOTE — CONSULTS
Nutrition Assessment (Parenteral Nutrition)    Type and Reason for Visit: Consult(Order and manage TPN)    Nutrition Recommendations: Start TPN at 41.7ml/hr with 250ml lipids with the following additives: Na acetate 40 mEq, K acetate 10 mEq, K phos 15 mMol, Ca gluconate 6mEq. Nutrition Assessment: Patient at nutrition risk as evidenced by increased nutritional needs for healing and inability to take PO. Will initiate parenteral nutrition and continue to monitor nutrition status. Malnutrition Assessment:  · Malnutrition Status: No malnutrition  · Context: Acute illness or injury  · Findings of the 6 clinical characteristics of malnutrition (Minimum of 2 out of 6 clinical characteristics is required to make the diagnosis of moderate or severe Protein Calorie Malnutrition based on AND/ASPEN Guidelines):  1. Energy Intake-Less than or equal to 75% of estimated energy requirement, (3 days)    2. Weight Loss-No significant weight loss,    3. Fat Loss-No significant subcutaneous fat loss,    4. Muscle Loss-No significant muscle mass loss,    5. Fluid Accumulation-Mild fluid accumulation,    6.   Strength-Not measured    Nutrition Risk Level: High    Nutrient Needs:  · Estimated Daily Total Kcal: 8434-5237 based on 8-10kcal/kg admission wt of 158.4kg  · Estimated Daily Protein (g): 118 based on 2g/kg IBW of 59kg    Nutrition Diagnosis:   · Problem: Inadequate energy intake  · Etiology: related to Insufficient energy/nutrient consumption     Signs and symptoms:  as evidenced by NPO status due to medical condition    Objective Information:  · Nutrition-Focused Physical Findings: Edema: RLE, LLE +1 pitting  · Wound Type: Surgical Wound  · Current Nutrition Therapies:  · Oral Diet Orders: NPO   · Oral Diet intake: NPO  · Parenteral Nutrition Orders:  · Type and Formula: Premix Central   · Lipids: 250ml  · Rate/Volume: 41.7ml/hr, 1000ml total volume  · Duration: Continuous  · Current PN Order Provides: 1380 kcal and 50g protein per day  · Anthropometric Measures:  · Ht: 5' 5\" (165.1 cm)   · Admission Body Wt: 349 lb 3.3 oz (158.4 kg)  · Ideal Body Wt: 130 lb 1.1 oz (59 kg), BMI Classification: BMI > or equal to 40.0 Obese Class III    Nutrition Interventions:   Start Parenteral Nutrition  Continued Inpatient Monitoring, Education Not Indicated    Nutrition Evaluation:   · Evaluation: Goals set   · Goals: Parenteral nutrition will provide greater than or equal to 75% of estimated needs   · Monitoring: Nutrition Progression, Weight, Skin Integrity, Wound Healing, I&O, Pertinent Labs, PN Intake      Artur Zimmerman, [address] [place] [address],   [phone]

## 2020-03-14 NOTE — PROGRESS NOTES
Active bowel sounds x 4 quadrants. Musculoskeletal: Active ROM x 4 extremities. No cyanosis or clubbing. Integumentary: Pink, warm and dry. Free from rash or lesions. Skin turgor normal.  CNS: Oriented to person, place and time. Speech clear. Face symmetrical. No tremor. Data:  CBC:   Lab Results   Component Value Date    WBC 10.2 03/14/2020    HGB 9.0 (L) 03/14/2020    HCT 27.7 (L) 03/14/2020    MCV 87.3 03/14/2020     (L) 03/14/2020     BMP:    Lab Results   Component Value Date     03/14/2020    K 4.2 03/14/2020     03/14/2020    CO2 22 03/14/2020    BUN 58 (H) 03/14/2020    CREATININE 1.68 (H) 03/14/2020    GLUCOSE 123 (H) 03/14/2020     CMP:   Lab Results   Component Value Date     03/14/2020    K 4.2 03/14/2020     03/14/2020    CO2 22 03/14/2020    BUN 58 (H) 03/14/2020    CREATININE 1.68 (H) 03/14/2020    GLUCOSE 123 (H) 03/14/2020    CALCIUM 8.2 (L) 03/14/2020    PROT 5.4 (L) 03/13/2020    LABALBU 2.8 (L) 03/13/2020    BILITOT 0.50 03/13/2020    ALKPHOS 147 (H) 03/13/2020    AST 24 03/13/2020    ALT <5 (L) 03/13/2020      Hepatic:   Lab Results   Component Value Date    AST 24 03/13/2020    ALT <5 (L) 03/13/2020    BILITOT 0.50 03/13/2020    ALKPHOS 147 (H) 03/13/2020     BNP: No results found for: BNP  Lipids: No results found for: CHOL, HDL  INR:   Lab Results   Component Value Date    INR 1.1 05/21/2019     PTH: No results found for: PTH  Phosphorus:    Lab Results   Component Value Date    PHOS 4.1 03/14/2020     Ionized Calcium: No results found for: IONCA  Magnesium:   Lab Results   Component Value Date    MG 2.9 (H) 03/14/2020     Albumin:   Lab Results   Component Value Date    LABALBU 2.8 (L) 03/13/2020     Last 3 CK, CKMB, Troponin: @LABRCNT(CKTOTAL:3,CKMB:3,TROPONINI:3)       URINE:)No results found for: Candance Brighter    Radiology:   Reviewed. Assessment:  1. Acute Kidney Injury-likely prerenal. FeNa was 0.9% without urine eosinophils.  US showed no hydronephrosis. Cr is better. 2. CKD stage 3.  3. Hypotension. 4. Metabolic acidosis. 5. Colostomy. 6. Hx of hypocalcemia and vit D deficiency. 7. Strangulated colonic hernia. 8. Sepsis. 9. CHF. Plan:  Off pressors. Will discontinue current IVF. Monitor BP. Follow up blood and urine cultures. On Merepenem. Renal diet once diet advanced. Avoid hypotension, nephrotoxic drugs, Lovenox, Fleets enema and IV contrast exposure. Follow up labs ordered for AM.     Please do not hesitate to call with questions. We will follow with you. Electronically signed by Marlin Macias MD  on 3/14/2020 at 11:17 AM  [facility].   Ph: [phone]

## 2020-03-14 NOTE — PLAN OF CARE
Problem: Pain:  Goal: Pain level will decrease  Description: Pain level will decrease  3/14/2020 0556 by Karolina Newsome RN  Outcome: Ongoing  Pain maintained at tolerable level throughout shift. Cryo cuff in place. Goal: Control of acute pain  Description: Control of acute pain  3/14/2020 0556 by Karolina Newsome RN  Outcome: Ongoing    Goal: Control of chronic pain  Description: Control of chronic pain  3/14/2020 0556 by Karolina Newsome RN  Outcome: Ongoing     Problem: Musculor/Skeletal Functional Status  Goal: Highest potential functional level  3/14/2020 0556 by Karolina Newsome RN  Outcome: Ongoing    Goal: Absence of falls  3/14/2020 0556 by Karolina Newsome RN  Outcome: Ongoing  Pt remains free of falls this shift. Side rails up. Bed alarm on. Bed in lowest position. Bed wheels locked. Room door open. Call light and bedside table within reach. Problem: Falls - Risk of:  Goal: Will remain free from falls  Description: Will remain free from falls  3/14/2020 0556 by Karolina Newsome RN  Outcome: Ongoing    Goal: Absence of physical injury  Description: Absence of physical injury  3/14/2020 0556 by Karolina Newsome RN  Outcome: Ongoing     Problem: Gas Exchange - Impaired:  Goal: Levels of oxygenation will improve  Description: Levels of oxygenation will improve  3/14/2020 0556 by Karolina Newsome RN  Outcome: Ongoing  Pt maintained on nasal cannula. Continuous pulse ox. Breathing treatments per RT. TCDB, flutter valve, and IS encouraged. Problem: Skin Integrity - Impaired:  Goal: Will show no infection signs and symptoms  Description: Will show no infection signs and symptoms  3/14/2020 0556 by Karolina Newsome RN  Outcome: Ongoing  No new skin breakdown noted this shift. Pt encouraged to turn and reposition. Skin assessed and maintained. Pt bathed. Lotion and powder applied. Linens changed. Feet elevated.      Goal: Absence of new skin breakdown  Description: Absence of new skin breakdown  3/14/2020 0556 by Bea Juan RN  Outcome: Ongoing     Problem: Infection - Central Venous Catheter-Associated Bloodstream Infection:  Goal: Will show no infection signs and symptoms  Description: Will show no infection signs and symptoms  Outcome: Ongoing  TLC dressing c/d/I. Alcohol caps in place. Chlorhexidine bath given. Problem: Discharge Planning:  Goal: Discharged to appropriate level of care  Description: Discharged to appropriate level of care  Outcome: Ongoing  Case management following for d/c planning.       Problem: Mobility - Impaired:  Goal: Mobility will improve  Description: Mobility will improve  Outcome: Ongoing

## 2020-03-14 NOTE — PLAN OF CARE
Nutrition Problem: Inadequate energy intake  Intervention: Food and/or Nutrient Delivery: Start Parenteral Nutrition  Nutritional Goals: Parenteral nutrition will provide greater than or equal to 75% of estimated needs

## 2020-03-14 NOTE — PROGRESS NOTES
[facility]  Progress Note    3/14/2020 6:54 PM  Subjective: Interval History: Pt is off pressors, her NG output is dark brown in color, has stool in ostomy, abd pain has become much less, has a cough. Diet: Diet NPO Effective Now Exceptions are: Other (See Comment)  PN-Adult 2-in-1 Central Line (Standard)    Medications:   Reviewed medications    Labs:   CBC:   Recent Labs     03/14/20  0457   WBC 10.2   HGB 9.0*   *     BMP:    Recent Labs     03/14/20  0457      K 4.2      CO2 22   BUN 58*   CREATININE 1.68*   GLUCOSE 123*     Hepatic:   Recent Labs     03/13/20  0740   AST 24   ALT <5*   BILITOT 0.50   ALKPHOS 147*     Troponin: No results for input(s): TROPONINI in the last 72 hours. BNP: No results for input(s): BNP in the last 72 hours. Lipids: No results for input(s): CHOL, HDL in the last 72 hours. Invalid input(s): LDLCALCU  INR: No results for input(s): INR in the last 72 hours.       Objective:   Vitals: BP (!) 145/71   Pulse 70   Temp 98.9 °F (37.2 °C) (Axillary)   Resp 22   Ht 5' 5\" (1.651 m)   Wt (!) 349 lb 3.3 oz (158.4 kg)   SpO2 98%   BMI 58.11 kg/m²   General appearance: alert and cooperative with exam  Neck: no adenopathy, no carotid bruit, no JVD, supple, symmetrical, trachea midline and thyroid not enlarged, symmetric, no tenderness/mass/nodules  Lungs: clear to auscultation bilaterally  Heart: regular rate and rhythm, S1, S2 normal, no murmur, click, rub or gallop  Abdomen: soft, non-tender; bowel sounds normal; no masses,  no organomegaly  Extremities: extremities normal, atraumatic, no cyanosis or edema  Neurologic: Mental status: Alert, oriented, thought content appropriate    Assessment:     Patient Active Problem List    Diagnosis Date Noted    Postprocedural hypotension 03/12/2020    Acute kidney injury (MIGUE) with acute tubular necrosis (ATN) ([facility] [address] 03/12/2020    Arthritis of both knees 03/12/2020    Chronic obstructive pulmonary disease ([facility] [address]

## 2020-03-14 NOTE — PROGRESS NOTES
Bearing: Weight Bearing As Tolerated(Full WBAT)  Position Activity Restriction  Other position/activity restrictions: Activity orders per Dr Blessing Willett    Subjective: Pt supine in bed watching television this AM; pt reports feeling \"tired and weak\" but is plesant and agreeable to therapy. Comments: RN Renae duckworth bed program therapy this date and reports pt has scan this date; still awaiting sx details this date. Vital Signs  Pulse: 76(supine in bed)  Heart Rate Source: Monitor  BP: 126/61(supine in bed)  Patient Currently in Pain: Yes  Pain Assessment: 0-10  Pain Level: 8  Pain Type: Surgical pain  Pain Location: Knee  Pain Orientation: Right  Pain Descriptors: Delta Messing; Shooting  Pain Frequency: Intermittent(pain occurs with movement to R knee)  Functional Pain Assessment: Prevents or interferes some active activities and ADLs  Non-Pharmaceutical Pain Intervention(s): Distraction; Ambulation/Increased Activity;Cold applied; Rest;Repositioned; Emotional support  Response to Pain Intervention: Patient Satisfied  Multiple Pain Sites: No     Oxygen Therapy  SpO2: 94 %(supine in bed)  Pulse Oximeter Device Mode: Continuous  Pulse Oximeter Device Location: Finger  O2 Device: Nasal cannula  O2 Flow Rate (L/min): 1.5 L/min  Patient Observation  Observations: Pt is very motivated throughout tx and reports her compliance with performing gentle AROM/B LE therex throughout day to reduce joint stiffness. Pt wearing cryo-cuff to R knee upon writer arrival.         Bed Mobility  Rolling: Stand by assistance;Rolling Left;Contact guard assistance(Pt utilized bed rail)    Transfers:  Comments:unable to determine d/t in bed therapy restrictions per RN request                    WB Status: Full WBAT R LE           Stairs/Curb  Stairs?: No                                                     Comments: Fall Risk;  Unable to determine this date d/t bed therapy program only this date     Other exercises?: Yes  Other exercises 1: Supine B LE therex: 2x12 heel slides, quad set, SAQ  Other exercises 2: Educated pt to perform gentle AROM/B LE therex throughout day to reduce joint stiffness and increase strength  Other exercises 3: Doffed and Donned cryo-cuff to R knee and educated pt to to have 20 min on/off throughout day to reduce swelling/pain  Other exercises 4: Repositioned B UE's with pillows to ensure skin integ; educated pt over repositioning in bed every 2 hours to reduce chance of bedsore formation  Other exercises 5: Doffed/Donned IPC to B LE's  Other Activities  Comment: rest breaks prn and reminding pt to perform B LE slow and steady with fair carryover  Modalities  Cryotherapy (Minutes\Location): Surgical cold pack placed on R LE at end of session. Activity Tolerance: Patient limited by endurance; Patient limited by pain  Activity Tolerance: RN requests in bed therapy program this date and awaiting possible sx (Pt is scheduled for follow up scan this date)  PT Equipment Recommendations  Equipment Needed: No  Other: personal RW in room       Assessment  Activity Tolerance: Patient limited by endurance; Patient limited by pain   Body structures, Functions, Activity limitations: Decreased functional mobility ; Decreased ADL status; Decreased ROM; Decreased strength;Decreased endurance;Decreased balance; Increased pain  Specific instructions for Next Treatment: Discuss with PT about cont bed program therapy only and possible sx  Prognosis: Good  Discharge Recommendations: Patient would benefit from continued therapy after discharge     Type of devices: All fall risk precautions in place;Call light within reach; Patient at risk for falls; Left in bed;Nurse notified(LAQUITA Macdonald notified)     Plan  Times per week: BID(Saw only once this date per PT orders)  Current Treatment Recommendations: Strengthening, ROM, Balance Training, Functional Mobility Training, Transfer Training, Endurance Training, Gait Training, Equipment Evaluation, Education, & procurement,

## 2020-03-15 LAB
ABSOLUTE EOS #: 0.12 K/UL (ref 0–0.4)
ABSOLUTE IMMATURE GRANULOCYTE: ABNORMAL K/UL (ref 0–0.3)
ABSOLUTE LYMPH #: 0.36 K/UL (ref 1–4.8)
ABSOLUTE MONO #: 0.48 K/UL (ref 0.1–1.3)
ALBUMIN SERPL-MCNC: 2.6 G/DL (ref 3.5–5.2)
ALBUMIN/GLOBULIN RATIO: ABNORMAL (ref 1–2.5)
ALP BLD-CCNC: 119 U/L (ref 35–104)
ALT SERPL-CCNC: <5 U/L (ref 5–33)
ANION GAP SERPL CALCULATED.3IONS-SCNC: 11 MMOL/L (ref 9–17)
AST SERPL-CCNC: 16 U/L
BASOPHILS # BLD: 0 % (ref 0–2)
BASOPHILS ABSOLUTE: 0 K/UL (ref 0–0.2)
BILIRUB SERPL-MCNC: 0.58 MG/DL (ref 0.3–1.2)
BUN BLDV-MCNC: 55 MG/DL (ref 8–23)
BUN/CREAT BLD: ABNORMAL (ref 9–20)
CALCIUM SERPL-MCNC: 8.3 MG/DL (ref 8.6–10.4)
CHLORIDE BLD-SCNC: 107 MMOL/L (ref 98–107)
CO2: 25 MMOL/L (ref 20–31)
CREAT SERPL-MCNC: 1.47 MG/DL (ref 0.5–0.9)
DIFFERENTIAL TYPE: ABNORMAL
EOSINOPHILS RELATIVE PERCENT: 1 % (ref 0–4)
GFR AFRICAN AMERICAN: 43 ML/MIN
GFR NON-AFRICAN AMERICAN: 36 ML/MIN
GFR SERPL CREATININE-BSD FRML MDRD: ABNORMAL ML/MIN/{1.73_M2}
GFR SERPL CREATININE-BSD FRML MDRD: ABNORMAL ML/MIN/{1.73_M2}
GLUCOSE BLD-MCNC: 142 MG/DL (ref 65–105)
GLUCOSE BLD-MCNC: 152 MG/DL (ref 65–105)
GLUCOSE BLD-MCNC: 154 MG/DL (ref 65–105)
GLUCOSE BLD-MCNC: 171 MG/DL (ref 70–99)
GLUCOSE BLD-MCNC: 200 MG/DL (ref 65–105)
HCT VFR BLD CALC: 28.1 % (ref 36–46)
HEMOGLOBIN: 9 G/DL (ref 12–16)
IMMATURE GRANULOCYTES: ABNORMAL %
LYMPHOCYTES # BLD: 3 % (ref 24–44)
MAGNESIUM: 2.8 MG/DL (ref 1.6–2.6)
MCH RBC QN AUTO: 28.2 PG (ref 26–34)
MCHC RBC AUTO-ENTMCNC: 32 G/DL (ref 31–37)
MCV RBC AUTO: 87.9 FL (ref 80–100)
MONOCYTES # BLD: 4 % (ref 1–7)
MORPHOLOGY: ABNORMAL
MORPHOLOGY: ABNORMAL
NRBC AUTOMATED: ABNORMAL PER 100 WBC
PDW BLD-RTO: 15.3 % (ref 11.5–14.9)
PHOSPHORUS: 3.3 MG/DL (ref 2.6–4.5)
PLATELET # BLD: 144 K/UL (ref 150–450)
PLATELET ESTIMATE: ABNORMAL
PMV BLD AUTO: 8.3 FL (ref 6–12)
POTASSIUM SERPL-SCNC: 4.5 MMOL/L (ref 3.7–5.3)
PREALBUMIN: 6.9 MG/DL (ref 20–40)
RBC # BLD: 3.19 M/UL (ref 4–5.2)
RBC # BLD: ABNORMAL 10*6/UL
SEG NEUTROPHILS: 92 % (ref 36–66)
SEGMENTED NEUTROPHILS ABSOLUTE COUNT: 10.94 K/UL (ref 1.3–9.1)
SODIUM BLD-SCNC: 143 MMOL/L (ref 135–144)
TOTAL PROTEIN: 5.3 G/DL (ref 6.4–8.3)
TRIGL SERPL-MCNC: 108 MG/DL
WBC # BLD: 11.9 K/UL (ref 3.5–11)
WBC # BLD: ABNORMAL 10*3/UL

## 2020-03-15 PROCEDURE — 84100 ASSAY OF PHOSPHORUS: CPT

## 2020-03-15 PROCEDURE — 2580000003 HC RX 258: Performed by: INTERNAL MEDICINE

## 2020-03-15 PROCEDURE — 99024 POSTOP FOLLOW-UP VISIT: CPT | Performed by: ORTHOPAEDIC SURGERY

## 2020-03-15 PROCEDURE — 80053 COMPREHEN METABOLIC PANEL: CPT

## 2020-03-15 PROCEDURE — 97530 THERAPEUTIC ACTIVITIES: CPT

## 2020-03-15 PROCEDURE — 97164 PT RE-EVAL EST PLAN CARE: CPT

## 2020-03-15 PROCEDURE — 2060000000 HC ICU INTERMEDIATE R&B

## 2020-03-15 PROCEDURE — 2700000000 HC OXYGEN THERAPY PER DAY

## 2020-03-15 PROCEDURE — 83735 ASSAY OF MAGNESIUM: CPT

## 2020-03-15 PROCEDURE — 84134 ASSAY OF PREALBUMIN: CPT

## 2020-03-15 PROCEDURE — 6360000002 HC RX W HCPCS: Performed by: SURGERY

## 2020-03-15 PROCEDURE — 6360000002 HC RX W HCPCS: Performed by: INTERNAL MEDICINE

## 2020-03-15 PROCEDURE — 6370000000 HC RX 637 (ALT 250 FOR IP): Performed by: INTERNAL MEDICINE

## 2020-03-15 PROCEDURE — 85025 COMPLETE CBC W/AUTO DIFF WBC: CPT

## 2020-03-15 PROCEDURE — C9113 INJ PANTOPRAZOLE SODIUM, VIA: HCPCS | Performed by: FAMILY MEDICINE

## 2020-03-15 PROCEDURE — 94761 N-INVAS EAR/PLS OXIMETRY MLT: CPT

## 2020-03-15 PROCEDURE — 6360000002 HC RX W HCPCS: Performed by: ORTHOPAEDIC SURGERY

## 2020-03-15 PROCEDURE — 84478 ASSAY OF TRIGLYCERIDES: CPT

## 2020-03-15 PROCEDURE — 82947 ASSAY GLUCOSE BLOOD QUANT: CPT

## 2020-03-15 PROCEDURE — 97110 THERAPEUTIC EXERCISES: CPT

## 2020-03-15 PROCEDURE — 2500000003 HC RX 250 WO HCPCS: Performed by: SURGERY

## 2020-03-15 PROCEDURE — 2580000003 HC RX 258: Performed by: ORTHOPAEDIC SURGERY

## 2020-03-15 PROCEDURE — 36415 COLL VENOUS BLD VENIPUNCTURE: CPT

## 2020-03-15 PROCEDURE — 6370000000 HC RX 637 (ALT 250 FOR IP): Performed by: ORTHOPAEDIC SURGERY

## 2020-03-15 PROCEDURE — 6370000000 HC RX 637 (ALT 250 FOR IP): Performed by: FAMILY MEDICINE

## 2020-03-15 PROCEDURE — 2580000003 HC RX 258: Performed by: FAMILY MEDICINE

## 2020-03-15 PROCEDURE — 6360000002 HC RX W HCPCS: Performed by: FAMILY MEDICINE

## 2020-03-15 PROCEDURE — 94640 AIRWAY INHALATION TREATMENT: CPT

## 2020-03-15 RX ORDER — BENZONATATE 100 MG/1
200 CAPSULE ORAL EVERY 8 HOURS PRN
Status: DISCONTINUED | OUTPATIENT
Start: 2020-03-15 | End: 2020-03-17 | Stop reason: HOSPADM

## 2020-03-15 RX ORDER — METHYLPREDNISOLONE SODIUM SUCCINATE 40 MG/ML
20 INJECTION, POWDER, LYOPHILIZED, FOR SOLUTION INTRAMUSCULAR; INTRAVENOUS EVERY 12 HOURS
Status: DISCONTINUED | OUTPATIENT
Start: 2020-03-15 | End: 2020-03-16

## 2020-03-15 RX ORDER — METOCLOPRAMIDE HYDROCHLORIDE 5 MG/ML
5 INJECTION INTRAMUSCULAR; INTRAVENOUS EVERY 6 HOURS
Status: DISCONTINUED | OUTPATIENT
Start: 2020-03-15 | End: 2020-03-16

## 2020-03-15 RX ADMIN — ACETAMINOPHEN 650 MG: 325 TABLET, FILM COATED ORAL at 21:54

## 2020-03-15 RX ADMIN — PANTOPRAZOLE SODIUM 40 MG: 40 INJECTION, POWDER, FOR SOLUTION INTRAVENOUS at 09:14

## 2020-03-15 RX ADMIN — METOCLOPRAMIDE 5 MG: 5 INJECTION, SOLUTION INTRAMUSCULAR; INTRAVENOUS at 15:13

## 2020-03-15 RX ADMIN — ALBUTEROL SULFATE 2.5 MG: 2.5 SOLUTION RESPIRATORY (INHALATION) at 11:44

## 2020-03-15 RX ADMIN — HEPARIN SODIUM 5000 UNITS: 5000 INJECTION INTRAVENOUS; SUBCUTANEOUS at 09:19

## 2020-03-15 RX ADMIN — BENZONATATE 200 MG: 100 CAPSULE ORAL at 21:54

## 2020-03-15 RX ADMIN — MEROPENEM 1 G: 1 INJECTION, POWDER, FOR SOLUTION INTRAVENOUS at 21:53

## 2020-03-15 RX ADMIN — Medication 10 ML: at 22:06

## 2020-03-15 RX ADMIN — CALCIUM GLUCONATE: 94 INJECTION, SOLUTION INTRAVENOUS at 17:40

## 2020-03-15 RX ADMIN — SODIUM CHLORIDE 10 ML: 9 INJECTION INTRAMUSCULAR; INTRAVENOUS; SUBCUTANEOUS at 09:14

## 2020-03-15 RX ADMIN — ALBUTEROL SULFATE 2.5 MG: 2.5 SOLUTION RESPIRATORY (INHALATION) at 00:05

## 2020-03-15 RX ADMIN — SENNOSIDES AND DOCUSATE SODIUM 1 TABLET: 8.6; 5 TABLET ORAL at 21:54

## 2020-03-15 RX ADMIN — METHYLPREDNISOLONE SODIUM SUCCINATE 30 MG: 40 INJECTION, POWDER, FOR SOLUTION INTRAMUSCULAR; INTRAVENOUS at 09:24

## 2020-03-15 RX ADMIN — ALBUTEROL SULFATE 2.5 MG: 2.5 SOLUTION RESPIRATORY (INHALATION) at 20:10

## 2020-03-15 RX ADMIN — I.V. FAT EMULSION 250 ML: 20 EMULSION INTRAVENOUS at 18:02

## 2020-03-15 RX ADMIN — MEROPENEM 1 G: 1 INJECTION, POWDER, FOR SOLUTION INTRAVENOUS at 15:13

## 2020-03-15 RX ADMIN — METOCLOPRAMIDE 5 MG: 5 INJECTION, SOLUTION INTRAMUSCULAR; INTRAVENOUS at 22:02

## 2020-03-15 RX ADMIN — HEPARIN SODIUM 5000 UNITS: 5000 INJECTION INTRAVENOUS; SUBCUTANEOUS at 17:40

## 2020-03-15 RX ADMIN — ALBUTEROL SULFATE 2.5 MG: 2.5 SOLUTION RESPIRATORY (INHALATION) at 04:00

## 2020-03-15 RX ADMIN — METHYLPREDNISOLONE SODIUM SUCCINATE 20 MG: 40 INJECTION, POWDER, FOR SOLUTION INTRAMUSCULAR; INTRAVENOUS at 21:51

## 2020-03-15 RX ADMIN — ACETAMINOPHEN 650 MG: 325 TABLET, FILM COATED ORAL at 12:29

## 2020-03-15 RX ADMIN — INSULIN LISPRO 2 UNITS: 100 INJECTION, SOLUTION INTRAVENOUS; SUBCUTANEOUS at 09:19

## 2020-03-15 RX ADMIN — INSULIN LISPRO 2 UNITS: 100 INJECTION, SOLUTION INTRAVENOUS; SUBCUTANEOUS at 12:29

## 2020-03-15 RX ADMIN — I.V. FAT EMULSION 100 ML: 20 EMULSION INTRAVENOUS at 17:40

## 2020-03-15 RX ADMIN — ACETAMINOPHEN 650 MG: 325 TABLET, FILM COATED ORAL at 18:01

## 2020-03-15 RX ADMIN — MEROPENEM 1 G: 1 INJECTION, POWDER, FOR SOLUTION INTRAVENOUS at 06:07

## 2020-03-15 RX ADMIN — ALBUTEROL SULFATE 2.5 MG: 2.5 SOLUTION RESPIRATORY (INHALATION) at 16:26

## 2020-03-15 RX ADMIN — HEPARIN SODIUM 5000 UNITS: 5000 INJECTION INTRAVENOUS; SUBCUTANEOUS at 21:53

## 2020-03-15 RX ADMIN — ALBUTEROL SULFATE 2.5 MG: 2.5 SOLUTION RESPIRATORY (INHALATION) at 08:10

## 2020-03-15 ASSESSMENT — PAIN SCALES - GENERAL
PAINLEVEL_OUTOF10: 2
PAINLEVEL_OUTOF10: 8
PAINLEVEL_OUTOF10: 5

## 2020-03-15 NOTE — PROGRESS NOTES
General Surgery Progress Note            PATIENT NAME: Franco Lutz     TODAY'S DATE: 3/15/2020, 1:44 PM    SUBJECTIVE:    Pt seen and examined. She is been up in the chair. Passing flatus. Wants food. Abdominal x-ray noted. Colostomy is active. OBJECTIVE:   VITALS:  BP (!) 150/84   Pulse 64   Temp 97.8 °F (36.6 °C) (Oral)   Resp 22   Ht 5' 5\" (1.651 m)   Wt (!) 349 lb 3.3 oz (158.4 kg)   SpO2 96%   BMI 58.11 kg/m²      INTAKE/OUTPUT:      Intake/Output Summary (Last 24 hours) at 3/15/2020 1344  Last data filed at 3/15/2020 0600  Gross per 24 hour   Intake 1300 ml   Output 3900 ml   Net -2600 ml                 CONSTITUTIONAL:  awake and alert. No acute distress  HEART:   Regular  LUNGS:   Decreased air entry at bases  ABDOMEN:   Plenty of flatus and stool in the colostomy bag today. Nothing acute. Morbidly obese., Abdomen soft, non-tender, non-distended  EXTREMITIES:   Dressing intact. Mild edema. Data:  CBC with Differential:    Lab Results   Component Value Date    WBC 11.9 03/15/2020    RBC 3.19 03/15/2020    HGB 9.0 03/15/2020    HCT 28.1 03/15/2020     03/15/2020    MCV 87.9 03/15/2020    MCH 28.2 03/15/2020    MCHC 32.0 03/15/2020    RDW 15.3 03/15/2020    METASPCT 1 03/14/2020    LYMPHOPCT 3 03/15/2020    MONOPCT 4 03/15/2020    BASOPCT 0 03/15/2020    MONOSABS 0.48 03/15/2020    LYMPHSABS 0.36 03/15/2020    EOSABS 0.12 03/15/2020    BASOSABS 0.00 03/15/2020    DIFFTYPE NOT REPORTED 03/15/2020     BMP:    Lab Results   Component Value Date     03/15/2020    K 4.5 03/15/2020     03/15/2020    CO2 25 03/15/2020    BUN 55 03/15/2020    LABALBU 2.6 03/15/2020    CREATININE 1.47 03/15/2020    CALCIUM 8.3 03/15/2020    GFRAA 43 03/15/2020    LABGLOM 36 03/15/2020    GLUCOSE 171 03/15/2020       Radiology Review: KUB noted.       ASSESSMENT     Principal Problem:    Primary osteoarthritis of right knee  Active Problems:    Asthmatic bronchitis with acute exacerbation    Primary osteoarthritis of both knees    Ventral hernia    Essential hypertension    Intermittent asthma    Postprocedural hypotension    Acute kidney injury (MIGUE) with acute tubular necrosis (ATN) (HCC)    Morbid obesity with BMI of 50.0-59.9, adult ([facility] [address]  Resolved Problems:    * No resolved hospital problems. *      Plan  1. Discontinue NG tube. Liquid diet.   IV Reglan      Electronically signed by Franco Lutz MD  on 3/15/2020 at [address]

## 2020-03-15 NOTE — FLOWSHEET NOTE
03/15/20 1157   Provider Notification   Reason for Communication Review case   Provider Name Dr. Ruben Johnson   Provider Notification Physician   Method of Communication Face to face   Response See orders   Notification Time [id number]     RN rounded with Dr. Ruben Johnson. Reviewed that pt did have some nausea last night when tube was clamped per night shift report. Pt has been doing well today though. Dr. Ruben Johnson ordered to d/c the NG and start on clears. Dr. Ruben Johnson is ok for pt to go to PCU.      Electronically signed by Ivy Melendez RN on 3/15/2020 at 12:00 PM

## 2020-03-15 NOTE — PROGRESS NOTES
Patient into room 2112. Patient is A&Ox4, Vitals WNL. Patient denies pain. No distress noted. Patient's daughter at bedside.

## 2020-03-15 NOTE — CONSULTS
Nutrition Assessment (Parenteral Nutrition)    Type and Reason for Visit: Consult    Nutrition Recommendations: TPN rate increased to 65ml/hr and lipids decreased to 100ml with the following changes to additives. Na acetate 40 to 35, K acetate 10 to 0, insulin 0 to 6. Nutrition Assessment: Day 2 TPN, pt had NG tube clamped and clears have been started. Will continue to monitor oral intake and TPN. Malnutrition Assessment:  · Malnutrition Status: No malnutrition  · Context: Acute illness or injury  · Findings of the 6 clinical characteristics of malnutrition (Minimum of 2 out of 6 clinical characteristics is required to make the diagnosis of moderate or severe Protein Calorie Malnutrition based on AND/ASPEN Guidelines):  1. Energy Intake-Less than or equal to 75% of estimated energy requirement, (3 days)    2. Weight Loss-No significant weight loss,    3. Fat Loss-No significant subcutaneous fat loss,    4. Muscle Loss-No significant muscle mass loss,    5. Fluid Accumulation-Mild fluid accumulation,    6.   Strength-Not measured    Nutrition Risk Level: High    Nutrient Needs:  · Estimated Daily Total Kcal: 4436-8123 based on 8-10kcal/kg admission wt of 158.4kg  · Estimated Daily Protein (g): 118 based on 2g/kg IBW of 59kg    Nutrition Diagnosis:   · Problem: Inadequate energy intake  · Etiology: related to Insufficient energy/nutrient consumption     Signs and symptoms:  as evidenced by Other (Comment)(Need for clear liquid diet)    Objective Information:  · Nutrition-Focused Physical Findings: Edema: RLE, LLE +1 pitting  · Wound Type: Surgical Wound  · Current Nutrition Therapies:  · Oral Diet Orders: Clear Liquid   · Oral Diet intake: NPO  · Parenteral Nutrition Orders:  · Type and Formula: 2-in-1 Custom   · Lipids: 100ml  · Rate/Volume: 65ml/hr, 1560ml total volume  · Duration: Continuous  · Current PN Order Provides: 1573 kcal and 78g protein per day  · Goal PN Orders Provides: 1573 kcal and 78g protein per day  · Anthropometric Measures:  · Ht: 5' 5\" (165.1 cm)   · Admission Body Wt: 349 lb 3.3 oz (158.4 kg)  · Ideal Body Wt: 130 lb 1.1 oz (59 kg), % Ideal Body    · BMI Classification: BMI > or equal to 40.0 Obese Class III    Nutrition Interventions:   Continue current diet, Continue Parenteral Nutrition  Continued Inpatient Monitoring    Nutrition Evaluation:   · Evaluation: Progressing toward goals   · Goals: Parenteral nutrition will provide greater than or equal to 75% of estimated needs   · Monitoring: Nutrition Progression, Weight, Skin Integrity, Wound Healing, I&O, Pertinent Labs, PN Intake      Michelle Current, [address],   [phone]

## 2020-03-15 NOTE — PROGRESS NOTES
Physical Therapy    Facility/Department: [facility] ICU  Reassessment with updated goals    NAME: Karen Champagne  : 1955  MRN: [id number]    Date of Service: 3/15/2020    Discharge Recommendations:  Patient would benefit from continued therapy after discharge   PT Equipment Recommendations  Equipment Needed: No    Assessment   Body structures, Functions, Activity limitations: Decreased functional mobility ; Decreased ADL status; Decreased ROM; Decreased strength;Decreased endurance;Decreased balance  Assessment: Pt limited by endurance and strength this date. Pt currently in ICU - requiring a reassessment to prevent any decrease in function. Pt is SBA for bed mobility, min x1 for transfers and amb up to chair. Treatment Diagnosis: Impaired functional mobility 2* R TKA  Specific instructions for Next Treatment: Progress gait/exercise as able, monitor BP prn. Prognosis: Good  Exam: ROM, MMT, bed mobility, transfers, amb, balance  Clinical Presentation: Pt agreeable and cooperative for PT, very pleasant. Barriers to Learning: none known  REQUIRES PT FOLLOW UP: Yes  Activity Tolerance  Activity Tolerance: Patient limited by endurance; Patient limited by fatigue       Patient Diagnosis(es): The encounter diagnosis was Primary osteoarthritis of right knee. has a past medical history of Anemia, Arthritis, Colostomy in place [facility]), Diverticulitis, DJD (degenerative joint disease) of knee, H/O hand fracture, Hypertension, Morbid obesity with BMI of 50.0-59.9, adult ([facility] [address] Vitamin D deficiency, Wears glasses, and Wears partial dentures. has a past surgical history that includes  section; Tonsillectomy; colostomy; Colon surgery; Carpal tunnel release (Right, 2014); Carpal tunnel release (Left, 09/10/2014); Gastric bypass surgery (); Cholecystectomy; Colonoscopy; Hand surgery (Left, 2019); Total knee arthroplasty (Left, 2019); joint replacement (Left, 2019);  Total knee arthroplasty (Right, 3/10/2020); and central line (3/12/2020). Restrictions  Restrictions/Precautions  Restrictions/Precautions: Weight Bearing, Surgical Protocols, Fall Risk, Contact Precautions  Required Braces or Orthoses?: No  Implants present? : Metal implants(L TKA 9/17/19, R TKA 3/10/20)  Lower Extremity Weight Bearing Restrictions  Right Lower Extremity Weight Bearing: Weight Bearing As Tolerated(Full WBAT)  Position Activity Restriction  Other position/activity restrictions: Activity orders per Dr Eriberto Armenta: Impaired  Vision Exceptions: Wears glasses at all times  Hearing: Within functional limits     Subjective  General  Chart Reviewed: Yes  Patient assessed for rehabilitation services?: Yes  Additional Pertinent Hx: HTN, gastric bypass, hx of L TKA in Sept 2019  Response To Previous Treatment: Patient with no complaints from previous session. Family / Caregiver Present: No  Diagnosis: Primary OA R Knee  Follows Commands: Within Functional Limits  Subjective  Subjective: Pt in bed, LAQUITA Harmon PT out of bed. Patient reports feeling better.   Pain Screening  Patient Currently in Pain: Denies  Vital Signs  Patient Currently in Pain: Denies  Oxygen Therapy  O2 Device: None (Room air)  Patient Observation  Observations: NG, IV, colostomy bag, R LE bandage over incision       Orientation  Orientation  Overall Orientation Status: Within Normal Limits  Social/Functional History  Social/Functional History  Lives With: Alone  Type of Home: House  Home Layout: One level  Home Access: Level entry  Bathroom Shower/Tub: Tub/Shower unit, Curtain  Bathroom Toilet: Handicap height  Bathroom Equipment: Tub transfer bench, Grab bars in shower, Hand-held shower  Bathroom Accessibility: Accessible, Walker accessible  Home Equipment: Rolling walker, Cane  ADL Assistance: [address] [place] [address]: Independent  Homemaking Responsibilities: Yes  Ambulation Assistance: Independent  Transfer Assistance: Independent  Active : Yes  Mode of Transportation: Car  Occupation: Retired  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 Cognition        Objective          AROM RLE (degrees)  RLE AROM: WFL  RLE General AROM:   AROM LLE (degrees)  LLE AROM : WFL  AROM RUE (degrees)  RUE AROM : WFL  AROM LUE (degrees)  LUE AROM : WFL  Strength RLE  Strength RLE: WFL  Strength LLE  Strength LLE: WFL  Strength RUE  Strength RUE: WFL  Strength LUE  Strength LUE: WFL     Sensation  Overall Sensation Status: WFL  Bed mobility  Rolling to Left: Stand by assistance  Supine to Sit: Stand by assistance  Scooting: Stand by assistance  Comment: HOB elevated, cues as needed, assist for line management, Increased time. Pt has increased coughing at EOB - clear sputum came up. Transfers  Sit to Stand: Minimal Assistance  Stand to sit: Minimal Assistance  Bed to Chair: Minimal assistance  Stand Pivot Transfers: Minimal Assistance  Comment: Cues for safe technique and RW management, writer assisted with numerous lines. Pt states feeling OK prior to standing, denies dizziness/lightheadedness  Ambulation  Ambulation?: Yes  WB Status: Full WBAT R LE  Ambulation 1  Surface: level tile  Device: Rolling Walker  Other Apparatus: O2  Assistance: Minimal assistance  Quality of Gait: slow gabriela, cueing as needed, forward trunk lean, no LOB  Gait Deviations: Slow Gabriela  Distance: 5'  Comments: Assist with line management. Pt educated on use of recliner to elevate LE - pt declined currently.    Stairs/Curb  Stairs?: No     Balance  Posture: Good  Sitting - Static: Good  Sitting - Dynamic: Good;-  Standing - Static: Fair;+(with RW)  Standing - Dynamic: Fair(with RW)        Plan   Plan  Times per week: BID  Specific instructions for Next Treatment: Progress gait/exercise as able,

## 2020-03-15 NOTE — PLAN OF CARE
Problem: Pain:  Goal: Pain level will decrease  Description: Pain level will decrease  Outcome: Ongoing  Note: Pt medicated with pain medication prn. Assessed all pain characteristics including level, type, location, frequency, and onset. Non-pharmacologic interventions offered to pt as well. Pt states pain is tolerable at this time. Will continue to monitor       Problem: Skin Integrity - Impaired:  Goal: Will show no infection signs and symptoms  Description: Will show no infection signs and symptoms  Outcome: Ongoing  Note: Pt shows no signs or symptoms of infection at this time. VS stable, alert and oriented x4. Hand hygiene utilized upon entry and exit. Will continue to monitor. Problem: Infection - Central Venous Catheter-Associated Bloodstream Infection:  Goal: Will show no infection signs and symptoms  Description: Will show no infection signs and symptoms  Outcome: Ongoing  Note: Antimicrobial patch is intact, site is clean      Problem: Falls - Risk of:  Goal: Will remain free from falls  Description: Will remain free from falls  Note: Pt assessed as a fall risk this shift. Remains free from falls and accidental injury at this time. Fall precautions in place, including falling star sign and fall risk band on pt. Floor free from obstacles, and bed is locked and in lowest position. Adequate lighting provided. Pt encouraged to call before getting OOB for any need. Bed alarm activated. Will continue to monitor needs during hourly rounding, and reinforce education on use of call light.

## 2020-03-15 NOTE — FLOWSHEET NOTE
03/15/20 1346   Provider Notification   Reason for Communication Review case   Provider Name Dr. Glenn Mendez   Provider Notification Physician   Method of Communication Call   Response See orders   Notification Time [facility] to transfer to U. Order entered. RN notified Jacqueline Griffith.      Electronically signed by Michelle Lee RN on 3/15/2020 at 1:47 PM

## 2020-03-15 NOTE — CARE COORDINATION
DISCHARGE PLANNING NOTE:      LSW is following for patient to discharge to SNF [facility]. TPN will need to be discontinued before patient is admitted to the facility. PT recommends SNF. Patient had KUB yesterday. Bedside RN received order to clamp NG tube. Per pulmonology note, septic shock appears resolved. POD #5 right TKA. Remains on IV merrem, solumedrol 30q12 and levophed.     Patient will use [facility] when she returns home.      MARIEL IS SIGNED AND NEEDS COMPLETED.      Will continue to follow for discharge needs.

## 2020-03-15 NOTE — PROGRESS NOTES
Physical Therapy  Facility/Department: [facility] ICU  Daily Treatment Note  NAME: Anai Lee  : 1955  MRN: [id number]    Date of Service: 3/15/2020    Discharge Recommendations:  Patient would benefit from continued therapy after discharge   PT Equipment Recommendations  Equipment Needed: No    Assessment   Body structures, Functions, Activity limitations: Decreased functional mobility ; Decreased ADL status; Decreased ROM; Decreased strength;Decreased endurance;Decreased balance  Assessment: Pt limited by endurance and strength. Pt very motivated and will benefit from continued therapy to maximize her functional potential.  Treatment Diagnosis: Impaired functional mobility 2* R TKA  Specific instructions for Next Treatment: Progress gait/exercise as able, monitor BP prn. Prognosis: Good  History: KNEE COMPLEX TOTAL ARTHROPLASTY R knee on 3/10/20 by Dr Markel Arreola  Exam: ROM, MMT, bed mobility, transfers, amb, balance  Clinical Presentation: Pt agreeable and cooperative for PT, very pleasant. Barriers to Learning: none known  REQUIRES PT FOLLOW UP: Yes  Activity Tolerance  Activity Tolerance: Patient limited by endurance; Patient limited by fatigue     Patient Diagnosis(es): The encounter diagnosis was Primary osteoarthritis of right knee. has a past medical history of Anemia, Arthritis, Colostomy in place [facility]), Diverticulitis, DJD (degenerative joint disease) of knee, H/O hand fracture, Hypertension, Morbid obesity with BMI of 50.0-59.9, adult ([facility] [address] Vitamin D deficiency, Wears glasses, and Wears partial dentures. has a past surgical history that includes  section; Tonsillectomy; colostomy; Colon surgery; Carpal tunnel release (Right, 2014); Carpal tunnel release (Left, 09/10/2014); Gastric bypass surgery (); Cholecystectomy; Colonoscopy; Hand surgery (Left, 2019); Total knee arthroplasty (Left, 2019); joint replacement (Left, 2019);  Total knee arthroplasty (Right, 3/10/2020); and Minimal assistance  Quality of Gait: slow gabriela, forward trunk lean  Gait Deviations: Slow Gabriela  Distance: 5' forward/retro  Comments: Including static standing for 1 minute, Pt's vitals monitored. Distance limited due to numerous lines/monitors. Stairs/Curb  Stairs?: No     Balance  Posture: Good  Sitting - Static: Good  Sitting - Dynamic: Good;-  Standing - Static: Fair;+(with RW)  Standing - Dynamic: Fair(with RW)  Comments: Fall risk  Other exercises  Other exercises 1: Seated program with 1# ankle weights and blue tband x15 reps, breaks as needed  Other exercises 2: 2# weighted ball with trunk rotation, bicep curls, and diagonals x10-15 reps seated for core activation and UE strength   AROM RLE (degrees)  RLE AROM: WFL  RLE General AROM: Knee AROM: 0-101  AROM LLE (degrees)  LLE AROM : WFL  AROM RUE (degrees)  RUE AROM : WFL  AROM LUE (degrees)  LUE AROM : WFL  Strength RLE  Strength RLE: WFL  Strength LLE  Strength LLE: WFL  Strength RUE  Strength RUE: WFL  Strength LUE  Strength LUE: WFL                 G-Code     OutComes Score                                                     AM-PAC Score     AM-PAC Inpatient Mobility without Stair Climbing Raw Score : 15 (03/15/20 0921)  AM-PAC Inpatient without Stair Climbing T-Scale Score : 43.03 (03/15/20 0921)  Mobility Inpatient CMS 0-100% Score: 47.43 (03/15/20 0921)  Mobility Inpatient without Stair CMS G-Code Modifier : CK (03/15/20 0921)       Goals  Short term goals  Time Frame for Short term goals: 3-4 days  Short term goal 1: Pt to demonstrate supervision bed mobility (rolling, supine to sit). Short term goal 2: Pt to demonstrate min x1 sit to supine with good B LE management. Short term goal 3: Pt to improve transfers CGA x1. Short term goal 4: Pt to amb 50'-80' with RW, min/CGA x1. Short term goal 5: Pt to improve R knee AROM 0-105 to improve ease of functional mobility.   Short term goal 6: Pt to improve standing balance to Good- and standing tolerance for at least 2 minutes CGA. Patient Goals   Patient goals : To go to rehab/get better    Plan    Plan  Times per week: BID  Specific instructions for Next Treatment: Progress gait/exercise as able, monitor BP prn. Current Treatment Recommendations: Strengthening, ROM, Balance Training, Functional Mobility Training, Transfer Training, Endurance Training, Gait Training, Equipment Evaluation, Education, & procurement, Patient/Caregiver Education & Training, Safety Education & Training, Positioning  Safety Devices  Type of devices:  All fall risk precautions in place, Call light within reach, Patient at risk for falls, Nurse notified, Left in chair, Gait belt(LAQUITA Lyles)     Therapy Time   Individual Concurrent Group Co-treatment   Time In 1300         Time Out 1335         Minutes 35         Timed Code Treatment Minutes: [address] [facility], [facility] [address], [place]

## 2020-03-15 NOTE — PROGRESS NOTES
Reason for Follow up: MIGUE. HISTORY:    Feels better. NG removed. On liquid diet. Review Of Systems:   Constitutional: No fever, chills, lethargy, weakness or wt loss. Cardiac:  No chest pain, dyspnea, orthopnea or PND. Pulmonary:  No cough, phlegm or wheezing. Abdomen:  No abdominal pain, nausea, vomiting or diarrhea. :   No hematuria, pyuria, dysuria or flank pain. Extremities:  No swelling or joint pains. Scheduled Meds:   methylPREDNISolone  30 mg Intravenous Q12H    fat emulsion  250 mL Intravenous Daily    meropenem  1 g Intravenous Q8H    pantoprazole  40 mg Intravenous Daily    And    sodium chloride (PF)  10 mL Intravenous Daily    heparin (porcine)  5,000 Units Subcutaneous 3 times per day    albuterol  2.5 mg Nebulization Q4H    insulin lispro  0-12 Units Subcutaneous TID WC    insulin lispro  0-6 Units Subcutaneous Nightly    sodium chloride flush  10 mL Intravenous 2 times per day    acetaminophen  650 mg Oral Q6H    sennosides-docusate sodium  1 tablet Oral BID   Continuous Infusions:   PN-Adult 2-in-1 Central Line (Standard) 41.7 mL/hr at 03/14/20 1722    norepinephrine Stopped (03/13/20 1300)    dextrose       PRN Meds:glucose, dextrose, glucagon (rDNA), dextrose, fentanNYL, perflutren lipid microspheres, polyethylene glycol, ondansetron, sodium chloride flush, oxyCODONE **OR** [DISCONTINUED] oxyCODONE    Allergies   Allergen Reactions    Shellfish-Derived Products      Hives, nausea       Physical Exam:  Blood pressure 115/68, pulse 72, temperature 98.5 °F (36.9 °C), temperature source Oral, resp. rate 20, height 5' 5\" (1.651 m), weight (!) 349 lb 3.3 oz (158.4 kg), SpO2 95 %. In: 1300 [I.V.:1200]  Out: 3900   In: 1300   Out: 3900 [Urine:2650]    General:  Awake, alert, not in distress. Appears to be stated age. HEENT: Atraumatic, normocephalic. Anicteric sclera. Pink and moist oral mucosa. No carotid bruit. No JVD.   Chest: Bilateral air entry, clear to auscultation, no wheezing, rhonchi or rales. Cardiovascular: RRR, S1S2, no murmur, rub or gallop. No lower extremity edema. Abdomen: Soft, non tender to palpation. Active bowel sounds x 4 quadrants. Musculoskeletal: Active ROM x 4 extremities. No cyanosis or clubbing. Integumentary: Pink, warm and dry. Free from rash or lesions. Skin turgor normal.  CNS: Oriented to person, place and time. Speech clear. Face symmetrical. No tremor.      Data:  CBC:   Lab Results   Component Value Date    WBC 11.9 (H) 03/15/2020    HGB 9.0 (L) 03/15/2020    HCT 28.1 (L) 03/15/2020    MCV 87.9 03/15/2020     (L) 03/15/2020     BMP:    Lab Results   Component Value Date     03/15/2020    K 4.5 03/15/2020     03/15/2020    CO2 25 03/15/2020    BUN 55 (H) 03/15/2020    CREATININE 1.47 (H) 03/15/2020    GLUCOSE 171 (H) 03/15/2020     CMP:   Lab Results   Component Value Date     03/15/2020    K 4.5 03/15/2020     03/15/2020    CO2 25 03/15/2020    BUN 55 (H) 03/15/2020    CREATININE 1.47 (H) 03/15/2020    GLUCOSE 171 (H) 03/15/2020    CALCIUM 8.3 (L) 03/15/2020    PROT 5.3 (L) 03/15/2020    LABALBU 2.6 (L) 03/15/2020    BILITOT 0.58 03/15/2020    ALKPHOS 119 (H) 03/15/2020    AST 16 03/15/2020    ALT <5 (L) 03/15/2020      Hepatic:   Lab Results   Component Value Date    AST 16 03/15/2020    ALT <5 (L) 03/15/2020    BILITOT 0.58 03/15/2020    ALKPHOS 119 (H) 03/15/2020     BNP: No results found for: BNP  Lipids: No results found for: CHOL, HDL  INR:   Lab Results   Component Value Date    INR 1.1 05/21/2019     PTH: No results found for: PTH  Phosphorus:    Lab Results   Component Value Date    PHOS 3.3 03/15/2020     Ionized Calcium: No results found for: IONCA  Magnesium:   Lab Results   Component Value Date    MG 2.8 (H) 03/15/2020     Albumin:   Lab Results   Component Value Date    LABALBU 2.6 (L) 03/15/2020     Last 3 CK, CKMB, Troponin: @LABRCNT(CKTOTAL:3,CKMB:3,TROPONINI:3)       URINE:)No

## 2020-03-15 NOTE — PROGRESS NOTES
03/12/2020    Obstructive sleep apnea syndrome 03/12/2020    Severe depression ([facility] 03/12/2020    Stage 2 chronic kidney disease 03/12/2020    Morbid obesity with BMI of 50.0-59.9, adult ([facility] [address]     Status post total left knee replacement 11/14/2019    Primary osteoarthritis of right knee 11/14/2019    Open wound of left knee 10/30/2019    Delayed surgical wound healing 10/30/2019    Hyperkalemia 09/19/2019    Acute gastritis without hemorrhage 09/19/2019    Primary osteoarthritis of left knee 09/17/2019    Urinary tract infection without hematuria 05/24/2019    Acute kidney injury ([facility] [address] 05/23/2019    Anasarca 05/21/2019    Intermittent asthma 05/02/2017    Essential hypertension     SBO (small bowel obstruction) ([facility] [address] 05/01/2017    Ventral hernia 05/01/2017    Primary cough headache 04/01/2017    Asthmatic bronchitis with acute exacerbation 03/31/2017    Primary osteoarthritis of both knees 03/31/2017        Plan:   1.  Start clears if OK with GS, OK to Tx to PCU    Electronically signed by Colleen Cooney MD on 3/15/2020 at 6:55 PM

## 2020-03-15 NOTE — PLAN OF CARE
Nutrition Problem: Inadequate energy intake  Intervention: Food and/or Nutrient Delivery: Continue current diet, Continue Parenteral Nutrition  Nutritional Goals: Parenteral nutrition will provide greater than or equal to 75% of estimated needs

## 2020-03-15 NOTE — PROGRESS NOTES
rales wheezes appreciated  Heart Exam:PMI normal. No lifts, heaves, or thrills. RRR. No murmurs, clicks, gallops, or rubs  Abdomen Exam: Abdomen soft, non-tender.  BS normal.   Extremity Exam: No edema    MEDS      methylPREDNISolone  30 mg Intravenous Q12H    fat emulsion  250 mL Intravenous Daily    meropenem  1 g Intravenous Q8H    pantoprazole  40 mg Intravenous Daily    And    sodium chloride (PF)  10 mL Intravenous Daily    heparin (porcine)  5,000 Units Subcutaneous 3 times per day    albuterol  2.5 mg Nebulization Q4H    insulin lispro  0-12 Units Subcutaneous TID WC    insulin lispro  0-6 Units Subcutaneous Nightly    sodium chloride flush  10 mL Intravenous 2 times per day    acetaminophen  650 mg Oral Q6H    sennosides-docusate sodium  1 tablet Oral BID      PN-Adult 2-in-1 Central Line (Standard) 41.7 mL/hr at 03/14/20 1722    norepinephrine Stopped (03/13/20 1300)    dextrose       glucose, dextrose, glucagon (rDNA), dextrose, fentanNYL, perflutren lipid microspheres, polyethylene glycol, ondansetron, sodium chloride flush, oxyCODONE **OR** [DISCONTINUED] oxyCODONE    LABS   CBC   Recent Labs     03/15/20  0531   WBC 11.9*   HGB 9.0*   HCT 28.1*   MCV 87.9   *     BMP:   Lab Results   Component Value Date     03/15/2020    K 4.5 03/15/2020     03/15/2020    CO2 25 03/15/2020    BUN 55 03/15/2020    LABALBU 2.6 03/15/2020    CREATININE 1.47 03/15/2020    CALCIUM 8.3 03/15/2020    GFRAA 43 03/15/2020    LABGLOM 36 03/15/2020     ABGs:  Lab Results   Component Value Date    PHART 7.291 03/12/2020    PO2ART 68.7 03/12/2020    [id number] 36.4 03/12/2020      Lab Results   Component Value Date    MODE NOT REPORTED 03/12/2020     Ionized Calcium:  No results found for: IONCA  Magnesium:    Lab Results   Component Value Date    MG 2.8 03/15/2020     Phosphorus:    Lab Results   Component Value Date    PHOS 3.3 03/15/2020        LIVER PROFILE   Recent Labs     03/13/20  0740

## 2020-03-16 LAB
ABSOLUTE BANDS #: 0.29 K/UL (ref 0–1)
ABSOLUTE EOS #: 0 K/UL (ref 0–0.4)
ABSOLUTE IMMATURE GRANULOCYTE: ABNORMAL K/UL (ref 0–0.3)
ABSOLUTE LYMPH #: 0.29 K/UL (ref 1–4.8)
ABSOLUTE MONO #: 0.43 K/UL (ref 0.1–1.3)
ANION GAP SERPL CALCULATED.3IONS-SCNC: 14 MMOL/L (ref 9–17)
BANDS: 2 % (ref 0–10)
BASOPHILS # BLD: 0 % (ref 0–2)
BASOPHILS ABSOLUTE: 0 K/UL (ref 0–0.2)
BUN BLDV-MCNC: 55 MG/DL (ref 8–23)
BUN/CREAT BLD: ABNORMAL (ref 9–20)
CALCIUM SERPL-MCNC: 8.4 MG/DL (ref 8.6–10.4)
CHLORIDE BLD-SCNC: 104 MMOL/L (ref 98–107)
CO2: 22 MMOL/L (ref 20–31)
CREAT SERPL-MCNC: 1.36 MG/DL (ref 0.5–0.9)
CULTURE: ABNORMAL
DIFFERENTIAL TYPE: ABNORMAL
EOSINOPHILS RELATIVE PERCENT: 0 % (ref 0–4)
GFR AFRICAN AMERICAN: 47 ML/MIN
GFR NON-AFRICAN AMERICAN: 39 ML/MIN
GFR SERPL CREATININE-BSD FRML MDRD: ABNORMAL ML/MIN/{1.73_M2}
GFR SERPL CREATININE-BSD FRML MDRD: ABNORMAL ML/MIN/{1.73_M2}
GLUCOSE BLD-MCNC: 129 MG/DL (ref 65–105)
GLUCOSE BLD-MCNC: 133 MG/DL (ref 65–105)
GLUCOSE BLD-MCNC: 135 MG/DL (ref 65–105)
GLUCOSE BLD-MCNC: 180 MG/DL (ref 65–105)
GLUCOSE BLD-MCNC: 193 MG/DL (ref 70–99)
HCT VFR BLD CALC: 29.8 % (ref 36–46)
HEMOGLOBIN: 9.3 G/DL (ref 12–16)
IMMATURE GRANULOCYTES: ABNORMAL %
LYMPHOCYTES # BLD: 2 % (ref 24–44)
Lab: ABNORMAL
MAGNESIUM: 2.7 MG/DL (ref 1.6–2.6)
MCH RBC QN AUTO: 28.1 PG (ref 26–34)
MCHC RBC AUTO-ENTMCNC: 31.1 G/DL (ref 31–37)
MCV RBC AUTO: 90.4 FL (ref 80–100)
METAMYELOCYTES ABSOLUTE COUNT: 0.14 K/UL
METAMYELOCYTES: 1 %
MONOCYTES # BLD: 3 % (ref 1–7)
MORPHOLOGY: ABNORMAL
MORPHOLOGY: ABNORMAL
NRBC AUTOMATED: ABNORMAL PER 100 WBC
PDW BLD-RTO: 15.4 % (ref 11.5–14.9)
PHOSPHORUS: 2.8 MG/DL (ref 2.6–4.5)
PLATELET # BLD: 157 K/UL (ref 150–450)
PLATELET ESTIMATE: ABNORMAL
PMV BLD AUTO: 8.4 FL (ref 6–12)
POTASSIUM SERPL-SCNC: 4.3 MMOL/L (ref 3.7–5.3)
RBC # BLD: 3.3 M/UL (ref 4–5.2)
RBC # BLD: ABNORMAL 10*6/UL
SEG NEUTROPHILS: 92 % (ref 36–66)
SEGMENTED NEUTROPHILS ABSOLUTE COUNT: 13.15 K/UL (ref 1.3–9.1)
SODIUM BLD-SCNC: 140 MMOL/L (ref 135–144)
SPECIMEN DESCRIPTION: ABNORMAL
WBC # BLD: 14.3 K/UL (ref 3.5–11)
WBC # BLD: ABNORMAL 10*3/UL

## 2020-03-16 PROCEDURE — 80048 BASIC METABOLIC PNL TOTAL CA: CPT

## 2020-03-16 PROCEDURE — 2580000003 HC RX 258: Performed by: FAMILY MEDICINE

## 2020-03-16 PROCEDURE — 85025 COMPLETE CBC W/AUTO DIFF WBC: CPT

## 2020-03-16 PROCEDURE — 6360000002 HC RX W HCPCS: Performed by: INTERNAL MEDICINE

## 2020-03-16 PROCEDURE — 6360000002 HC RX W HCPCS: Performed by: SURGERY

## 2020-03-16 PROCEDURE — 2580000003 HC RX 258: Performed by: ORTHOPAEDIC SURGERY

## 2020-03-16 PROCEDURE — 84100 ASSAY OF PHOSPHORUS: CPT

## 2020-03-16 PROCEDURE — 97535 SELF CARE MNGMENT TRAINING: CPT

## 2020-03-16 PROCEDURE — 94640 AIRWAY INHALATION TREATMENT: CPT

## 2020-03-16 PROCEDURE — 6370000000 HC RX 637 (ALT 250 FOR IP): Performed by: INTERNAL MEDICINE

## 2020-03-16 PROCEDURE — 6370000000 HC RX 637 (ALT 250 FOR IP): Performed by: ORTHOPAEDIC SURGERY

## 2020-03-16 PROCEDURE — 6360000002 HC RX W HCPCS: Performed by: ORTHOPAEDIC SURGERY

## 2020-03-16 PROCEDURE — 2700000000 HC OXYGEN THERAPY PER DAY

## 2020-03-16 PROCEDURE — C9113 INJ PANTOPRAZOLE SODIUM, VIA: HCPCS | Performed by: FAMILY MEDICINE

## 2020-03-16 PROCEDURE — 6360000002 HC RX W HCPCS: Performed by: FAMILY MEDICINE

## 2020-03-16 PROCEDURE — 97116 GAIT TRAINING THERAPY: CPT

## 2020-03-16 PROCEDURE — 82947 ASSAY GLUCOSE BLOOD QUANT: CPT

## 2020-03-16 PROCEDURE — 97110 THERAPEUTIC EXERCISES: CPT

## 2020-03-16 PROCEDURE — 36415 COLL VENOUS BLD VENIPUNCTURE: CPT

## 2020-03-16 PROCEDURE — 2580000003 HC RX 258: Performed by: INTERNAL MEDICINE

## 2020-03-16 PROCEDURE — 2060000000 HC ICU INTERMEDIATE R&B

## 2020-03-16 PROCEDURE — 97530 THERAPEUTIC ACTIVITIES: CPT

## 2020-03-16 PROCEDURE — 94761 N-INVAS EAR/PLS OXIMETRY MLT: CPT

## 2020-03-16 PROCEDURE — 6370000000 HC RX 637 (ALT 250 FOR IP): Performed by: FAMILY MEDICINE

## 2020-03-16 PROCEDURE — 83735 ASSAY OF MAGNESIUM: CPT

## 2020-03-16 RX ORDER — PREDNISONE 10 MG/1
10 TABLET ORAL DAILY
Status: DISCONTINUED | OUTPATIENT
Start: 2020-03-16 | End: 2020-03-17 | Stop reason: HOSPADM

## 2020-03-16 RX ADMIN — METOCLOPRAMIDE 5 MG: 5 INJECTION, SOLUTION INTRAMUSCULAR; INTRAVENOUS at 05:13

## 2020-03-16 RX ADMIN — Medication 10 ML: at 09:48

## 2020-03-16 RX ADMIN — ACETAMINOPHEN 650 MG: 325 TABLET, FILM COATED ORAL at 05:13

## 2020-03-16 RX ADMIN — MEROPENEM 1 G: 1 INJECTION, POWDER, FOR SOLUTION INTRAVENOUS at 21:59

## 2020-03-16 RX ADMIN — ALBUTEROL SULFATE 2.5 MG: 2.5 SOLUTION RESPIRATORY (INHALATION) at 08:09

## 2020-03-16 RX ADMIN — ALBUTEROL SULFATE 2.5 MG: 2.5 SOLUTION RESPIRATORY (INHALATION) at 11:13

## 2020-03-16 RX ADMIN — METOCLOPRAMIDE 5 MG: 5 INJECTION, SOLUTION INTRAMUSCULAR; INTRAVENOUS at 11:46

## 2020-03-16 RX ADMIN — PREDNISONE 10 MG: 10 TABLET ORAL at 15:29

## 2020-03-16 RX ADMIN — ALBUTEROL SULFATE 2.5 MG: 2.5 SOLUTION RESPIRATORY (INHALATION) at 03:22

## 2020-03-16 RX ADMIN — METOCLOPRAMIDE 5 MG: 5 INJECTION, SOLUTION INTRAMUSCULAR; INTRAVENOUS at 15:26

## 2020-03-16 RX ADMIN — ACETAMINOPHEN 650 MG: 325 TABLET, FILM COATED ORAL at 18:30

## 2020-03-16 RX ADMIN — BENZONATATE 200 MG: 100 CAPSULE ORAL at 05:14

## 2020-03-16 RX ADMIN — SENNOSIDES AND DOCUSATE SODIUM 1 TABLET: 8.6; 5 TABLET ORAL at 09:47

## 2020-03-16 RX ADMIN — PANTOPRAZOLE SODIUM 40 MG: 40 INJECTION, POWDER, FOR SOLUTION INTRAVENOUS at 09:47

## 2020-03-16 RX ADMIN — INSULIN LISPRO 2 UNITS: 100 INJECTION, SOLUTION INTRAVENOUS; SUBCUTANEOUS at 09:47

## 2020-03-16 RX ADMIN — HEPARIN SODIUM 5000 UNITS: 5000 INJECTION INTRAVENOUS; SUBCUTANEOUS at 23:47

## 2020-03-16 RX ADMIN — HEPARIN SODIUM 5000 UNITS: 5000 INJECTION INTRAVENOUS; SUBCUTANEOUS at 15:25

## 2020-03-16 RX ADMIN — SODIUM CHLORIDE 10 ML: 9 INJECTION INTRAMUSCULAR; INTRAVENOUS; SUBCUTANEOUS at 09:48

## 2020-03-16 RX ADMIN — MEROPENEM 1 G: 1 INJECTION, POWDER, FOR SOLUTION INTRAVENOUS at 07:02

## 2020-03-16 RX ADMIN — ALBUTEROL SULFATE 2.5 MG: 2.5 SOLUTION RESPIRATORY (INHALATION) at 15:49

## 2020-03-16 RX ADMIN — Medication 10 ML: at 21:59

## 2020-03-16 RX ADMIN — ACETAMINOPHEN 650 MG: 325 TABLET, FILM COATED ORAL at 09:47

## 2020-03-16 RX ADMIN — HEPARIN SODIUM 5000 UNITS: 5000 INJECTION INTRAVENOUS; SUBCUTANEOUS at 09:49

## 2020-03-16 RX ADMIN — ALBUTEROL SULFATE 2.5 MG: 2.5 SOLUTION RESPIRATORY (INHALATION) at 19:27

## 2020-03-16 RX ADMIN — METHYLPREDNISOLONE SODIUM SUCCINATE 20 MG: 40 INJECTION, POWDER, FOR SOLUTION INTRAMUSCULAR; INTRAVENOUS at 09:47

## 2020-03-16 RX ADMIN — MEROPENEM 1 G: 1 INJECTION, POWDER, FOR SOLUTION INTRAVENOUS at 15:26

## 2020-03-16 RX ADMIN — SENNOSIDES AND DOCUSATE SODIUM 1 TABLET: 8.6; 5 TABLET ORAL at 21:59

## 2020-03-16 ASSESSMENT — PAIN DESCRIPTION - LOCATION: LOCATION: KNEE

## 2020-03-16 ASSESSMENT — PAIN SCALES - GENERAL
PAINLEVEL_OUTOF10: 5
PAINLEVEL_OUTOF10: 7
PAINLEVEL_OUTOF10: 4

## 2020-03-16 ASSESSMENT — PAIN DESCRIPTION - PAIN TYPE: TYPE: SURGICAL PAIN

## 2020-03-16 ASSESSMENT — PAIN DESCRIPTION - ORIENTATION: ORIENTATION: RIGHT

## 2020-03-16 ASSESSMENT — PAIN DESCRIPTION - PROGRESSION: CLINICAL_PROGRESSION: GRADUALLY IMPROVING

## 2020-03-16 NOTE — PROGRESS NOTES
Dr. Leonard Wood called nurse to update that she is going to start patient on general diet. Per renal patient to start renal diet when diet permits.

## 2020-03-16 NOTE — PROGRESS NOTES
[facility]   INPATIENT OCCUPATIONAL THERAPY  PROGRESS NOTE  Date: 3/16/2020  Patient Name: Citlaly Daley      Room:   MRN: [id number]    : 1955  (66 y.o.) Gender: female     Discharge Recommendations:  Further Occupational  Therapy is recommended upon facility discharge. Referring Practitioner: Dr Sd Oliveria   Diagnosis: s/p Right Total Knee Replacement   General  Chart Reviewed: Yes, Orders, Operative Notes, Progress Notes, Previous Admission, History and Physical  Patient assessed for rehabilitation services?: Yes  Additional Pertinent Hx: Past Left Total KNee Replacement Sep-2019  Family / Caregiver Present: No  Referring Practitioner: Dr Sd Oliveira   Diagnosis: s/p Right Total Knee Replacement     Restrictions  Restrictions/Precautions: Weight Bearing, Surgical Protocols, Fall Risk, Contact Precautions  Implants present? : Metal implants(L TKA/R TKA)  Other position/activity restrictions: Activity orders per Dr Sd Oliveira  Right Lower Extremity Weight Bearing: Weight Bearing As Tolerated(Full WBAT)  Required Braces or Orthoses?: No      Subjective  Subjective: \"Do I get to keep this? \" referring to AE for LBD tasks. Educated on online/community obtainment/hip kit. Comments: Pt is expecting DC to SNF  for additional rehab prior to home.   Patient Currently in Pain: Yes  Pain Level: 5  Pain Location: Knee  Pain Orientation: Right  Overall Orientation Status: Within Functional Limits  Patient Observation  Observations: multiple medical lines, aquacell R knee; cryopack on, cool  Pain Assessment  Pain Level: 5  Pain Type: Surgical pain  Pain Location: Knee  Pain Orientation: Right  Clinical Progression: Gradually improving  Response to Pain Intervention: Patient Satisfied    Objective        Balance  Sitting Balance: Modified independent   Standing Balance: Contact guard assistance  Standing Balance  Time: 2-3 min, 3-4 min c RW  Activity: func mobility, standing completing UE endurance;Decreased strength  Assessment: limited stand tolerance, F+ balance, G motivation; recpetive to LBD AE but required f/u for tech  Prognosis: Good  Discharge Recommendations: Patient would benefit from continued therapy after discharge  Activity Tolerance: Patient Tolerated treatment well  Safety Devices in place: Yes  Type of devices: Patient at risk for falls;Gait belt;Call light within reach; Left in bed             Patient Education:   Total Joint Program, distance AE/DME when appropriate  Learner:patient  Method: demonstration and explanation       Outcome: needs reinforcement     Plan  Safety Devices  Safety Devices in place: Yes  Type of devices: Patient at risk for falls, Gait belt, Call light within reach, Left in bed  Plan  Times per week: 2-4 days  Times per day: Twice a day  Current Treatment Recommendations: Strengthening, Endurance Training, Patient/Caregiver Education & Training, Safety Education & Training, Functional Mobility Training, Self-Care / ADL, Pain Management      Goals  Short term goals  Time Frame for Short term goals: 1-3 Days   Short term goal 1: Tolerate 10-25 minutes of general care tasks / mobility without increased fatigue / pain   Short term goal 2: D/V understanding of Modified care strategies with applicaiton to care needs   Short term goal 3: Participate in care using safe techniques for mobility and self care tasks   Short term goal 4: D/V understanding of Total Knee Program and Interventions for safe care        03/16/20 1104 03/16/20 1648   OT Individual Minutes   Time In 1025 1405   Time Out 1103 1430   Minutes 38 25         Electronically signed by LON Conner on 3/16/20 at 4:55 PM EDT

## 2020-03-16 NOTE — PLAN OF CARE
Nutrition Problem: Inadequate energy intake  Intervention: Food and/or Nutrient Delivery: Continue current diet, Discontinue Parenteral Nutrition  Nutritional Goals: PO intake % for most meals

## 2020-03-16 NOTE — PLAN OF CARE
Problem: Pain:  Goal: Pain level will decrease  Description: Pain level will decrease  Outcome: Met This Shift  Goal: Control of acute pain  Description: Control of acute pain  Outcome: Met This Shift  Goal: Control of chronic pain  Description: Control of chronic pain  Outcome: Met This Shift     Problem: Musculor/Skeletal Functional Status  Goal: Highest potential functional level  Outcome: Met This Shift  Note: Patient up with therapy walking in halls  Goal: Absence of falls  Outcome: Met This Shift     Problem: Falls - Risk of:  Goal: Will remain free from falls  Description: Will remain free from falls  Outcome: Met This Shift  Goal: Absence of physical injury  Description: Absence of physical injury  Outcome: Met This Shift     Problem: Gas Exchange - Impaired:  Goal: Levels of oxygenation will improve  Description: Levels of oxygenation will improve  Outcome: Met This Shift  Note: Patient off oxygen.       Problem: Skin Integrity - Impaired:  Goal: Will show no infection signs and symptoms  Description: Will show no infection signs and symptoms  Outcome: Met This Shift  Note: No fever or chills this shift  Goal: Absence of new skin breakdown  Description: Absence of new skin breakdown  Outcome: Met This Shift     Problem: Infection - Central Venous Catheter-Associated Bloodstream Infection:  Goal: Will show no infection signs and symptoms  Description: Will show no infection signs and symptoms  Outcome: Met This Shift     Problem: Mobility - Impaired:  Goal: Mobility will improve  Description: Mobility will improve  Outcome: Met This Shift     Problem: Nutrition  Goal: Optimal nutrition therapy  Outcome: Met This Shift

## 2020-03-16 NOTE — PROGRESS NOTES
soft, non-tender. Extremity Exam: No edema    MEDS      fat emulsion  100 mL Intravenous Daily    methylPREDNISolone  20 mg Intravenous Q12H    metoclopramide  5 mg Intravenous Q6H    meropenem  1 g Intravenous Q8H    pantoprazole  40 mg Intravenous Daily    And    sodium chloride (PF)  10 mL Intravenous Daily    heparin (porcine)  5,000 Units Subcutaneous 3 times per day    albuterol  2.5 mg Nebulization Q4H    insulin lispro  0-12 Units Subcutaneous TID WC    insulin lispro  0-6 Units Subcutaneous Nightly    sodium chloride flush  10 mL Intravenous 2 times per day    acetaminophen  650 mg Oral Q6H    sennosides-docusate sodium  1 tablet Oral BID      PN-Adult 2-in-1 Central Line (Standard) 65 mL/hr at 03/15/20 1740    norepinephrine Stopped (03/13/20 1300)    dextrose       benzonatate, glucose, dextrose, glucagon (rDNA), dextrose, fentanNYL, perflutren lipid microspheres, polyethylene glycol, ondansetron, sodium chloride flush, oxyCODONE **OR** [DISCONTINUED] oxyCODONE    LABS   CBC   Recent Labs     03/16/20  0516   WBC 14.3*   HGB 9.3*   HCT 29.8*   MCV 90.4        BMP:   Lab Results   Component Value Date     03/16/2020    K 4.3 03/16/2020     03/16/2020    CO2 22 03/16/2020    BUN 55 03/16/2020    LABALBU 2.6 03/15/2020    CREATININE 1.36 03/16/2020    CALCIUM 8.4 03/16/2020    GFRAA 47 03/16/2020    LABGLOM 39 03/16/2020     ABGs:  Lab Results   Component Value Date    PHART 7.291 03/12/2020    PO2ART 68.7 03/12/2020    [id number] 36.4 03/12/2020      Lab Results   Component Value Date    MODE NOT REPORTED 03/12/2020     Ionized Calcium:  No results found for: IONCA  Magnesium:    Lab Results   Component Value Date    MG 2.7 03/16/2020     Phosphorus:    Lab Results   Component Value Date    PHOS 2.8 03/16/2020        LIVER PROFILE   Recent Labs     03/15/20  0531   AST 16   ALT <5*   BILITOT 0.58   ALKPHOS 119*     INR No results for input(s): INR in the last 72 hours.   PTT Lab Results   Component Value Date    APTT 31.6 05/21/2019         RADIOLOGY     (See actual reports for details)    ASSESSMENT/PLAN     Patient Active Problem List   Diagnosis    Asthmatic bronchitis with acute exacerbation    Primary osteoarthritis of both knees    Primary cough headache    [facility]BO [facility] obstruction) [facility]    Ventral hernia    Essential hypertension    Intermittent asthma    Anasarca    Acute kidney injury ([facility] [address]    Urinary tract infection without hematuria    Primary osteoarthritis of left knee    Hyperkalemia    Acute gastritis without hemorrhage    Open wound of left knee    Delayed surgical wound healing    Status post total left knee replacement    Primary osteoarthritis of right knee    Postprocedural hypotension    Acute kidney injury (MIGUE) with acute tubular necrosis (ATN) ([facility]    Morbid obesity with BMI of 50.0-59.9, adult (HCC)    Arthritis of both knees    Chronic obstructive pulmonary disease ([facility])    Colostomy present ([facility] [address]    Disability of walking    Lymphedema    Obstructive sleep apnea syndrome    Severe depression (HCC)    Stage 2 chronic kidney disease     Patient's O2 saturations are 98% on room air  Septic shock is resolved  On Merrem for ESBL Klebsiella UTI  We will switch Solu-Medrol to p.o. prednisone and observe    Electronically signed by Barbra Pena MD on 3/16/2020 at 12:35 PM

## 2020-03-16 NOTE — PLAN OF CARE
Problem: Musculor/Skeletal Functional Status  Goal: Absence of falls  Outcome: Ongoing     Problem: Falls - Risk of:  Goal: Will remain free from falls  Description: Will remain free from falls  3/16/2020 0155 by Mitali Saldaña RN  Outcome: Ongoing  3/15/2020 1657 by Erasmo Peres RN  Note: Pt assessed as a fall risk this shift. Remains free from falls and accidental injury at this time. Fall precautions in place, including falling star sign and fall risk band on pt. Floor free from obstacles, and bed is locked and in lowest position. Adequate lighting provided. Pt encouraged to call before getting OOB for any need. Bed alarm activated. Will continue to monitor needs during hourly rounding, and reinforce education on use of call light. Pt has had zero falls or injuries so far this shift.

## 2020-03-16 NOTE — CONSULTS
Date:   3/16/2020  Patient name: Fernie Fung  Date of admission:  3/10/2020  9:50 AM  MRN:   [id number]  YOB: 1955  PCP: Victoria Fleischer, MD    Reason for Admission: Primary osteoarthritis of right knee [M17.11]  Primary osteoarthritis of right knee [M17.11]    Audiology consult: Bradycardia       Impression    Episode of transient bradycardia, sinus arrest (patient is known Reglan)  Status post right knee replacement March 10, 2020  Postop severe hypotension  Postop ileus required NG tube suction, TPN  Morbid obesity, history of snoring  No history of CAD  History of CKD stage III  History of colostomy 20 years ago    History of presenting illness: 73-year-old morbidly obese female who lives alone and she is a non-smoker and she has been independent in her activities of daily living underwent right knee replacement on March 10, 2020. Postop she had a severe hyportension, renal insufficiency, ileus. CT abdomen on March 12, 2020 showed a large left lateral abdominal hernia with a strangulated appearance with a dilated colon proximal to the hernia, midline ventral hernia containing small bowel without strangulation, findings of bariatric surgery, status post cholecystectomy. She has been on NG tube suction, TPN and she has been on Reglan. Today around 3:15 PM she had transient bradycardia with a heart rate up to 40 bpm and she also had dropped P waves. She did not have any symptoms. No previous history of CAD, syncope. She does have a history of snoring but at the time of transient bradycardia she was awake. Prior to that she did go through physiotherapy.     Labs 3/16/2020 sodium 140 potassium 4.3 BUN 55 creatinine 1.36 magnesium 2.7 glucose 135  Hemoglobin 9.3 WBC 14.3 platelets 513    Current ECG monitor sinus rhythm : Heart rate 60    Patient seen and examined with the patient's nurse  Morbidly obese female  She was resting comfortably  Hemodynamically stable    Medications:   Scheduled expansion was poor, diminished breath sound both bases, right axillary crackles  Heart: Cardiac apical impulse not palpable, heart sounds distant  Abdomen: Abdomen was very obese, previous surgical scar noted, colostomy noted  Extremities: Legs are obese, right knee bandage noted, previous left knee surgery scar noted  Neurologic: Mental status: Alert, oriented, thought content appropriate    EKG: normal sinus rhythm, unchanged from previous tracings. ECHO: reviewed. Ejection fraction: 60-65%, RVSP 27 no significant valvular abnormality   Stress Test: not obtained. Cardiac Angiography: not obtained. Assessment / Acute Cardiac Problems:     Episode of transient sinus bradycardia, sinus arrest (patient is on Reglan and it can cause bradycardia, AV block).   No previous history of syncope or Chong artery disease  Status post right knee replacement March 10, 2020  Postop ileus improved with conservative treatment, NG tube suction and Reglan and TPN for feeding    Patient Active Problem List:     Asthmatic bronchitis with acute exacerbation     Primary osteoarthritis of both knees     Primary cough headache     SBO (small bowel obstruction) ([facility]     Ventral hernia     Essential hypertension     Intermittent asthma     Anasarca     Acute kidney injury ([address]     Urinary tract infection without hematuria     Primary osteoarthritis of left knee     Hyperkalemia     Acute gastritis without hemorrhage     Open wound of left knee     Delayed surgical wound healing     Status post total left knee replacement     Primary osteoarthritis of right knee     Postprocedural hypotension     Acute kidney injury (MIGUE) with acute tubular necrosis (ATN) ([facility])     Morbid obesity with BMI of 50.0-59.9, adult ([facility])     Arthritis of both knees     Chronic obstructive pulmonary disease ([facility])     Colostomy present ([facility])     Disability of walking     Lymphedema     Obstructive sleep apnea syndrome     Severe depression ([facility])     Stage 2

## 2020-03-16 NOTE — PROGRESS NOTES
Nurse notified Dr. Yuan Adams that patient bradycardic with hr down in 40s and then had a 2.10 second pause. Patient asymptomatic. Dr. Yuan Adams gave orders to consult Dr. Rikki Maravilla.

## 2020-03-16 NOTE — PROGRESS NOTES
Nutrition Assessment    Type and Reason for Visit: Reassess    Nutrition Recommendations: Continue current diet. Wean off TPN. Nutrition Assessment: NG has been removed; Clear Liquids well tolerated. Diet advanced to solid foods (Renal Diet) but pt has not tried any solids yet. TPN to be discontinued, per surgeon. Pt clarified that she was intentionally losing wt prior to surgery; states pt prior to admission was 320lb. Son asking for diet education prior to discharge (healthy snacking for appropriate wt loss as well as any other restrictions needed). Malnutrition Assessment:  · Malnutrition Status: No malnutrition  · Context: Acute illness or injury  · Findings of the 6 clinical characteristics of malnutrition (Minimum of 2 out of 6 clinical characteristics is required to make the diagnosis of moderate or severe Protein Calorie Malnutrition based on AND/ASPEN Guidelines):  1. Energy Intake-Less than or equal to 75% of estimated energy requirement, (3 days)    2. Weight Loss-No significant weight loss,    3. Fat Loss-No significant subcutaneous fat loss,    4. Muscle Loss-No significant muscle mass loss,    5. Fluid Accumulation-Mild fluid accumulation,    6.  Strength-Not measured    Nutrition Risk Level: High    Nutrient Needs:  · Estimated Daily Total Kcal: 6525-8232 based on 8-10kcal/kg admission wt of 158.4kg  · Estimated Daily Protein (g): 118 based on 2g/kg IBW of 59kg    Nutrition Diagnosis:   · Problem: Inadequate energy intake  · Etiology: related to Insufficient energy/nutrient consumption     Signs and symptoms:  as evidenced by Diet history of poor intake(Previous NPO, Clear Liquid diet)    Objective Information:  · Nutrition-Focused Physical Findings: Hypoactive bowel sounds. Edema: +1 pitting RLE, LLE.    · Wound Type: Surgical Wound  · Current Nutrition Therapies:  · Oral Diet Orders: Renal   · Oral Diet intake: 51-75%  · Anthropometric Measures:  · Ht: 5' 5\" (165.1 cm)   · Current

## 2020-03-16 NOTE — PROGRESS NOTES
Reason for Follow up: MIGUE. Subjective:      Feels better. Denies any dyspnea  She is complaining of dry cough    Scheduled Meds:   predniSONE  10 mg Oral Daily    fat emulsion  100 mL Intravenous Daily    metoclopramide  5 mg Intravenous Q6H    meropenem  1 g Intravenous Q8H    pantoprazole  40 mg Intravenous Daily    And    sodium chloride (PF)  10 mL Intravenous Daily    heparin (porcine)  5,000 Units Subcutaneous 3 times per day    albuterol  2.5 mg Nebulization Q4H    insulin lispro  0-12 Units Subcutaneous TID WC    insulin lispro  0-6 Units Subcutaneous Nightly    sodium chloride flush  10 mL Intravenous 2 times per day    acetaminophen  650 mg Oral Q6H    sennosides-docusate sodium  1 tablet Oral BID   Continuous Infusions:   PN-Adult 2-in-1 Central Line (Standard) 65 mL/hr at 03/15/20 1740    norepinephrine Stopped (03/13/20 1300)    dextrose       PRN Meds:benzonatate, glucose, dextrose, glucagon (rDNA), dextrose, fentanNYL, perflutren lipid microspheres, polyethylene glycol, ondansetron, sodium chloride flush, oxyCODONE **OR** [DISCONTINUED] oxyCODONE    Allergies   Allergen Reactions    Shellfish-Derived Products      Hives, nausea       Physical Exam:  Blood pressure 127/66, pulse 58, temperature 97.3 °F (36.3 °C), temperature source Oral, resp. rate 17, height 5' 5\" (1.651 m), weight (!) 355 lb 9.6 oz (161.3 kg), SpO2 95 %. In: 8645 [P.O.:300; I.V.:940]  Out: 2750   In: 1240   Out: 2750 [Urine:1950]    General:  Awake, alert, not in distress. Appears to be stated age. HEENT: Atraumatic, normocephalic. Anicteric sclera. Pink and moist oral mucosa. Neck: No carotid bruit. No JVD. Chest: Bilateral air entry, clear to auscultation, no wheezing, rhonchi or rales. Cardiovascular: RRR, S1S2, no murmur, rub or gallop. No lower extremity edema. Abdomen: Soft, non tender to palpation. Active bowel sounds x 4 quadrants. Integumentary: Pink, warm and dry. Free from rash or lesions.

## 2020-03-16 NOTE — PROGRESS NOTES
Physical Therapy  Facility/Department: [facility] PROGRESSIVE CARE  Daily Treatment Note  NAME: Brock Beth  : 1955  MRN: [id number]    Date of Service: 3/16/2020    Discharge Recommendations:  Patient would benefit from continued therapy after discharge   PT Equipment Recommendations  Equipment Needed: No    Assessment   Body structures, Functions, Activity limitations: Decreased functional mobility ; Decreased ADL status; Decreased ROM; Decreased strength;Decreased endurance;Decreased balance  Assessment: Pt limited by endurance and strength. Pt very motivated and will benefit from continued therapy to maximize her functional potential.  Specific instructions for Next Treatment: Progress gait/exercise as able, monitor BP prn. Prognosis: Good  History: KNEE COMPLEX TOTAL ARTHROPLASTY R knee on 3/10/20 by Dr Jasson Bennett Presentation: Pt agreeable and cooperative for PT, very pleasant. Barriers to Learning: none known  REQUIRES PT FOLLOW UP: Yes  Activity Tolerance  Activity Tolerance: Patient limited by endurance; Patient limited by fatigue     Patient Diagnosis(es): The encounter diagnosis was Primary osteoarthritis of right knee. has a past medical history of Anemia, Arthritis, Colostomy in place [facility]), Diverticulitis, DJD (degenerative joint disease) of knee, H/O hand fracture, Hypertension, Morbid obesity with BMI of 50.0-59.9, adult ([facility] [address] Vitamin D deficiency, Wears glasses, and Wears partial dentures. has a past surgical history that includes  section; Tonsillectomy; colostomy; Colon surgery; Carpal tunnel release (Right, 2014); Carpal tunnel release (Left, 09/10/2014); Gastric bypass surgery (); Cholecystectomy; Colonoscopy; Hand surgery (Left, 2019); Total knee arthroplasty (Left, 2019); joint replacement (Left, 2019); Total knee arthroplasty (Right, 3/10/2020); and central line (3/12/2020).     Restrictions  Restrictions/Precautions  Restrictions/Precautions: Weight Bearing, Surgical Protocols, Fall Risk, Contact Precautions  Required Braces or Orthoses?: No  Implants present? : Metal implants(L TKA/R TKA)  Lower Extremity Weight Bearing Restrictions  Right Lower Extremity Weight Bearing: Weight Bearing As Tolerated(Full WBAT)  Position Activity Restriction  Other position/activity restrictions: Activity orders per Dr Steph Haney  Chart Reviewed: Yes  Additional Pertinent Hx: HTN, gastric bypass, hx of L TKA in Sept 2019  Response To Previous Treatment: Patient with no complaints from previous session. Family / Caregiver Present: No  Referring Practitioner: Kenyon Lofton MD  Subjective  Subjective: Pt is pleasant and agreeable to PT. Pt on 1lpm of O2 upon arrival. Pt's SPO2 is 98% on RA amd 1lpm of O2. General Comment  Comments: LAQUITA Adler pt for PT session. Pain Screening  Patient Currently in Pain: Denies  Vital Signs  Patient Currently in Pain: Denies  Oxygen Therapy  O2 Device: None (Room air)  Patient Observation  Observations: NG removed, IV colostomy, R LE bandage over incision       Orientation  Orientation  Overall Orientation Status: Within Normal Limits  Objective   Bed mobility  Supine to Sit: Stand by assistance  Sit to Supine: Stand by assistance  Scooting: Stand by assistance  Comment: Pt requires increase time to complete. Pt utilizes bed rails. Transfers  Sit to Stand: Contact guard assistance  Stand to sit: Contact guard assistance  Comment: Pt demos good hand placement with all transfers. All transfers completed with Bariactric RW. No LOB noted. Ambulation  Ambulation?: Yes  WB Status: Full WBAT RLE  Ambulation 1  Surface: level tile  Device: Rolling Walker  Assistance: Contact guard assistance  Quality of Gait: slow gabriela, forward trunk lean. Pt demos increase hip flexion with decrease knee flexion to advance RLE. Pt requires vc's for technique however pt then demos slight circumduction with advancing RLE.    Gait Deviations: Slow Lissette  Distance: 50'x2   Comments: Pt's SPO2 ranges % on RA during amb. Ambulation 2  Surface - 2: level tile  Device 2: Rolling Walker  Quality of Gait 2: same as above. Distance: 70'x2  Stairs/Curb  Stairs?: No     Balance  Posture: Good  Sitting - Static: Good  Sitting - Dynamic: Good;-  Standing - Static: Fair;+(with RW)  Standing - Dynamic: Fair(with RW)  Comments: Fall risk, standing balance assessed with RW  Other exercises  Other exercises?: Yes  Other exercises 1: Seated program with 1# ankle weights and blue tband x15 reps, breaks as needed  Other exercises 3: Doffed and Donned cryo-cuff to R knee and educated pt to to have 20 min on/off throughout day to reduce swelling/pain  Other exercises 6: standing BLE ex's with RW for UE support. REps: 10 each. Pt requires a seated rest break between ex's. Other exercises 7: Education provided on BLE ex's to help maintain/increase strength and improve indep. Other exercises 8: bed mob x1. Pt requiresincrease time to complete. STS x5 with RW, CGA/Min  A x1     Comment: rest breaks prn and reminding pt to perform B LE slow and steady with fair carryover            Goals  Short term goals  Time Frame for Short term goals: 3-4 days  Short term goal 1: Pt to demonstrate supervision bed mobility (rolling, supine to sit). Short term goal 2: Pt to demonstrate min x1 sit to supine with good B LE management. Short term goal 3: Pt to improve transfers CGA x1. Short term goal 4: Pt to amb 50'-80' with RW, min/CGA x1. Short term goal 5: Pt to improve R knee AROM 0-105 to improve ease of functional mobility. Short term goal 6: Pt to improve standing balance to Good- and standing tolerance for at least 2 minutes CGA. Patient Goals   Patient goals : To go to rehab/get better    Plan    Plan  Times per week: BID  Specific instructions for Next Treatment: Progress gait/exercise as able, monitor BP prn.   Current Treatment Recommendations: Strengthening, ROM,

## 2020-03-16 NOTE — PROGRESS NOTES
Surgery Progress Note            PATIENT NAME: Barnden Macedo     TODAY'S DATE: 3/16/2020, 12:41 PM    Chief complaint:  Abdominal pain    SUBJECTIVE:    Pt is having no abdominal pain and stoma is putting out stool. OBJECTIVE:   VITALS:  /60   Pulse 56   Temp 97.4 °F (36.3 °C) (Oral)   Resp 16   Ht 5' 5\" (1.651 m)   Wt (!) 355 lb 9.6 oz (161.3 kg)   SpO2 96%   BMI 59.18 kg/m²      INTAKE/OUTPUT:      Intake/Output Summary (Last 24 hours) at 3/16/2020 1241  Last data filed at 3/16/2020 1005  Gross per 24 hour   Intake 1240 ml   Output 2750 ml   Net -1510 ml       PHYSICAL EXAM  General Appearance: Morbidly obese  Skin:  Skin color, texture, turgor normal. No rashes or lesions. Head/face:  NCAT  Lungs:  Normal expansion. Clear to auscultation. No rales, rhonchi, or wheezing. Heart:  Heart sounds are normal.  Regular rate and rhythm without murmur, gallop or rub.   Abdomen:  Obese with peristomal hernia that is reducible partially  Extremities: trace pedal edema         Data:  CBC:   Lab Results   Component Value Date    WBC 15.4 03/17/2020    RBC 3.31 03/17/2020    HGB 9.3 03/17/2020    HCT 29.2 03/17/2020    MCV 88.1 03/17/2020    MCH 28.2 03/17/2020    MCHC 32.0 03/17/2020    RDW 14.9 03/17/2020     03/17/2020    MPV 8.1 03/17/2020     CMP:    Lab Results   Component Value Date     03/17/2020    K 3.8 03/17/2020     03/17/2020    CO2 26 03/17/2020    BUN 49 03/17/2020    CREATININE 1.22 03/17/2020    GFRAA 53 03/17/2020    LABGLOM 44 03/17/2020    GLUCOSE 115 03/17/2020    PROT 5.3 03/15/2020    LABALBU 2.6 03/15/2020    CALCIUM 8.4 03/17/2020    BILITOT 0.58 03/15/2020    ALKPHOS 119 03/15/2020    AST 16 03/15/2020    ALT <5 03/15/2020         ASSESSMENT   Patient Active Problem List   Diagnosis    Asthmatic bronchitis with acute exacerbation    Primary osteoarthritis of both knees    Primary cough headache    SBO (small bowel obstruction) (HCC)    Ventral hernia   

## 2020-03-17 ENCOUNTER — APPOINTMENT (OUTPATIENT)
Dept: INTERVENTIONAL RADIOLOGY/VASCULAR | Age: 65
DRG: 469 | End: 2020-03-17
Attending: ORTHOPAEDIC SURGERY
Payer: MEDICARE

## 2020-03-17 VITALS
TEMPERATURE: 97.9 F | SYSTOLIC BLOOD PRESSURE: 106 MMHG | DIASTOLIC BLOOD PRESSURE: 65 MMHG | HEIGHT: 65 IN | BODY MASS INDEX: 48.82 KG/M2 | OXYGEN SATURATION: 98 % | HEART RATE: 67 BPM | WEIGHT: 293 LBS | RESPIRATION RATE: 19 BRPM

## 2020-03-17 LAB
ABSOLUTE BANDS #: 0.77 K/UL (ref 0–1)
ABSOLUTE EOS #: 0 K/UL (ref 0–0.4)
ABSOLUTE IMMATURE GRANULOCYTE: ABNORMAL K/UL (ref 0–0.3)
ABSOLUTE LYMPH #: 1.39 K/UL (ref 1–4.8)
ABSOLUTE MONO #: 2.16 K/UL (ref 0.1–1.3)
ANION GAP SERPL CALCULATED.3IONS-SCNC: 10 MMOL/L (ref 9–17)
BANDS: 5 % (ref 0–10)
BASOPHILS # BLD: 0 % (ref 0–2)
BASOPHILS ABSOLUTE: 0 K/UL (ref 0–0.2)
BUN BLDV-MCNC: 49 MG/DL (ref 8–23)
BUN/CREAT BLD: ABNORMAL (ref 9–20)
CALCIUM SERPL-MCNC: 8.4 MG/DL (ref 8.6–10.4)
CHLORIDE BLD-SCNC: 105 MMOL/L (ref 98–107)
CO2: 26 MMOL/L (ref 20–31)
CREAT SERPL-MCNC: 1.22 MG/DL (ref 0.5–0.9)
DIFFERENTIAL TYPE: ABNORMAL
EOSINOPHILS RELATIVE PERCENT: 0 % (ref 0–4)
GFR AFRICAN AMERICAN: 53 ML/MIN
GFR NON-AFRICAN AMERICAN: 44 ML/MIN
GFR SERPL CREATININE-BSD FRML MDRD: ABNORMAL ML/MIN/{1.73_M2}
GFR SERPL CREATININE-BSD FRML MDRD: ABNORMAL ML/MIN/{1.73_M2}
GLUCOSE BLD-MCNC: 115 MG/DL (ref 70–99)
GLUCOSE BLD-MCNC: 80 MG/DL (ref 65–105)
GLUCOSE BLD-MCNC: 86 MG/DL (ref 65–105)
GLUCOSE BLD-MCNC: 95 MG/DL (ref 65–105)
HCT VFR BLD CALC: 29.2 % (ref 36–46)
HEMOGLOBIN: 9.3 G/DL (ref 12–16)
IMMATURE GRANULOCYTES: ABNORMAL %
LYMPHOCYTES # BLD: 9 % (ref 24–44)
MAGNESIUM: 2.5 MG/DL (ref 1.6–2.6)
MCH RBC QN AUTO: 28.2 PG (ref 26–34)
MCHC RBC AUTO-ENTMCNC: 32 G/DL (ref 31–37)
MCV RBC AUTO: 88.1 FL (ref 80–100)
METAMYELOCYTES ABSOLUTE COUNT: 0.15 K/UL
METAMYELOCYTES: 1 %
MONOCYTES # BLD: 14 % (ref 1–7)
MORPHOLOGY: NORMAL
MYELOCYTES ABSOLUTE COUNT: 0.31 K/UL
MYELOCYTES: 2 %
NRBC AUTOMATED: ABNORMAL PER 100 WBC
PDW BLD-RTO: 14.9 % (ref 11.5–14.9)
PHOSPHORUS: 2.4 MG/DL (ref 2.6–4.5)
PLATELET # BLD: 188 K/UL (ref 150–450)
PLATELET ESTIMATE: ABNORMAL
PMV BLD AUTO: 8.1 FL (ref 6–12)
POTASSIUM SERPL-SCNC: 3.8 MMOL/L (ref 3.7–5.3)
RBC # BLD: 3.31 M/UL (ref 4–5.2)
RBC # BLD: ABNORMAL 10*6/UL
SEG NEUTROPHILS: 69 % (ref 36–66)
SEGMENTED NEUTROPHILS ABSOLUTE COUNT: 10.62 K/UL (ref 1.3–9.1)
SODIUM BLD-SCNC: 141 MMOL/L (ref 135–144)
WBC # BLD: 15.4 K/UL (ref 3.5–11)
WBC # BLD: ABNORMAL 10*3/UL

## 2020-03-17 PROCEDURE — 84100 ASSAY OF PHOSPHORUS: CPT

## 2020-03-17 PROCEDURE — 05H933Z INSERTION OF INFUSION DEVICE INTO RIGHT BRACHIAL VEIN, PERCUTANEOUS APPROACH: ICD-10-PCS | Performed by: RADIOLOGY

## 2020-03-17 PROCEDURE — 85025 COMPLETE CBC W/AUTO DIFF WBC: CPT

## 2020-03-17 PROCEDURE — 80048 BASIC METABOLIC PNL TOTAL CA: CPT

## 2020-03-17 PROCEDURE — 94640 AIRWAY INHALATION TREATMENT: CPT

## 2020-03-17 PROCEDURE — 97530 THERAPEUTIC ACTIVITIES: CPT

## 2020-03-17 PROCEDURE — 6370000000 HC RX 637 (ALT 250 FOR IP): Performed by: ORTHOPAEDIC SURGERY

## 2020-03-17 PROCEDURE — 76000 FLUOROSCOPY <1 HR PHYS/QHP: CPT | Performed by: RADIOLOGY

## 2020-03-17 PROCEDURE — 2709999900 IR FLUORO GUIDED CVA DEVICE PLMT/REPLACE/REMOVAL

## 2020-03-17 PROCEDURE — 2580000003 HC RX 258: Performed by: ORTHOPAEDIC SURGERY

## 2020-03-17 PROCEDURE — 36415 COLL VENOUS BLD VENIPUNCTURE: CPT

## 2020-03-17 PROCEDURE — 6360000002 HC RX W HCPCS: Performed by: INTERNAL MEDICINE

## 2020-03-17 PROCEDURE — 36410 VNPNXR 3YR/> PHY/QHP DX/THER: CPT | Performed by: RADIOLOGY

## 2020-03-17 PROCEDURE — 6370000000 HC RX 637 (ALT 250 FOR IP): Performed by: INTERNAL MEDICINE

## 2020-03-17 PROCEDURE — 82947 ASSAY GLUCOSE BLOOD QUANT: CPT

## 2020-03-17 PROCEDURE — 76937 US GUIDE VASCULAR ACCESS: CPT | Performed by: RADIOLOGY

## 2020-03-17 PROCEDURE — 6360000002 HC RX W HCPCS: Performed by: FAMILY MEDICINE

## 2020-03-17 PROCEDURE — C9113 INJ PANTOPRAZOLE SODIUM, VIA: HCPCS | Performed by: FAMILY MEDICINE

## 2020-03-17 PROCEDURE — 6360000002 HC RX W HCPCS: Performed by: ORTHOPAEDIC SURGERY

## 2020-03-17 PROCEDURE — 2580000003 HC RX 258: Performed by: INTERNAL MEDICINE

## 2020-03-17 PROCEDURE — 2580000003 HC RX 258: Performed by: FAMILY MEDICINE

## 2020-03-17 PROCEDURE — 94761 N-INVAS EAR/PLS OXIMETRY MLT: CPT

## 2020-03-17 PROCEDURE — 97116 GAIT TRAINING THERAPY: CPT

## 2020-03-17 PROCEDURE — 97110 THERAPEUTIC EXERCISES: CPT

## 2020-03-17 PROCEDURE — 83735 ASSAY OF MAGNESIUM: CPT

## 2020-03-17 RX ORDER — ALBUTEROL SULFATE 2.5 MG/3ML
2.5 SOLUTION RESPIRATORY (INHALATION) EVERY 4 HOURS
Qty: 120 EACH | Refills: 3
Start: 2020-03-17

## 2020-03-17 RX ORDER — POLYETHYLENE GLYCOL 3350 17 G/17G
17 POWDER, FOR SOLUTION ORAL DAILY PRN
Qty: 527 G | Refills: 1
Start: 2020-03-17 | End: 2020-04-16

## 2020-03-17 RX ORDER — HEPARIN SODIUM 5000 [USP'U]/ML
5000 INJECTION, SOLUTION INTRAVENOUS; SUBCUTANEOUS EVERY 8 HOURS SCHEDULED
Qty: 21 VIAL | Refills: 0
Start: 2020-03-17 | End: 2020-03-24

## 2020-03-17 RX ADMIN — MEROPENEM 1 G: 1 INJECTION, POWDER, FOR SOLUTION INTRAVENOUS at 06:08

## 2020-03-17 RX ADMIN — ACETAMINOPHEN 650 MG: 325 TABLET, FILM COATED ORAL at 01:00

## 2020-03-17 RX ADMIN — ALBUTEROL SULFATE 2.5 MG: 2.5 SOLUTION RESPIRATORY (INHALATION) at 03:44

## 2020-03-17 RX ADMIN — ACETAMINOPHEN 650 MG: 325 TABLET, FILM COATED ORAL at 06:07

## 2020-03-17 RX ADMIN — MEROPENEM 1 G: 1 INJECTION, POWDER, FOR SOLUTION INTRAVENOUS at 14:29

## 2020-03-17 RX ADMIN — HEPARIN SODIUM 5000 UNITS: 5000 INJECTION INTRAVENOUS; SUBCUTANEOUS at 18:22

## 2020-03-17 RX ADMIN — Medication 10 ML: at 09:47

## 2020-03-17 RX ADMIN — SODIUM CHLORIDE 10 ML: 9 INJECTION INTRAMUSCULAR; INTRAVENOUS; SUBCUTANEOUS at 09:46

## 2020-03-17 RX ADMIN — OXYCODONE HYDROCHLORIDE 5 MG: 5 TABLET ORAL at 09:51

## 2020-03-17 RX ADMIN — PANTOPRAZOLE SODIUM 40 MG: 40 INJECTION, POWDER, FOR SOLUTION INTRAVENOUS at 09:45

## 2020-03-17 RX ADMIN — ALBUTEROL SULFATE 2.5 MG: 2.5 SOLUTION RESPIRATORY (INHALATION) at 00:07

## 2020-03-17 RX ADMIN — SENNOSIDES AND DOCUSATE SODIUM 1 TABLET: 8.6; 5 TABLET ORAL at 09:44

## 2020-03-17 RX ADMIN — HEPARIN SODIUM 5000 UNITS: 5000 INJECTION INTRAVENOUS; SUBCUTANEOUS at 09:45

## 2020-03-17 RX ADMIN — PREDNISONE 10 MG: 10 TABLET ORAL at 09:44

## 2020-03-17 RX ADMIN — ALBUTEROL SULFATE 2.5 MG: 2.5 SOLUTION RESPIRATORY (INHALATION) at 07:31

## 2020-03-17 RX ADMIN — ALBUTEROL SULFATE 2.5 MG: 2.5 SOLUTION RESPIRATORY (INHALATION) at 11:03

## 2020-03-17 ASSESSMENT — PAIN DESCRIPTION - PAIN TYPE
TYPE: SURGICAL PAIN
TYPE: SURGICAL PAIN

## 2020-03-17 ASSESSMENT — PAIN DESCRIPTION - ORIENTATION
ORIENTATION: RIGHT
ORIENTATION: RIGHT

## 2020-03-17 ASSESSMENT — PAIN SCALES - GENERAL
PAINLEVEL_OUTOF10: 5
PAINLEVEL_OUTOF10: 5
PAINLEVEL_OUTOF10: 4
PAINLEVEL_OUTOF10: 8

## 2020-03-17 ASSESSMENT — PAIN DESCRIPTION - LOCATION
LOCATION: KNEE
LOCATION: KNEE

## 2020-03-17 ASSESSMENT — PAIN SCALES - WONG BAKER: WONGBAKER_NUMERICALRESPONSE: 4;6

## 2020-03-17 ASSESSMENT — PAIN DESCRIPTION - PROGRESSION: CLINICAL_PROGRESSION: GRADUALLY IMPROVING

## 2020-03-17 NOTE — PROGRESS NOTES
Pulmonary Progress Note  [facility]      Patient - Mario Starr,  Age - 75 y.o.    - 1955      Room Number - 2112/2112-01   N -  [id number]   [facility]  Date of Admission -  3/10/2020  9:50 AM    Consulting iVnod Weber MD  Primary Care Physician - Jose Guadalupe Hemphill MD     SUBJECTIVE   Patient is resting quietly. No acute distress. She looks good. Denies any shortness of breath chest pain fever chills    OBJECTIVE   VITALS    height is 5' 5\" (1.651 m) and weight is 354 lb 11.5 oz (160.9 kg) (abnormal). Her oral temperature is 97.9 °F (36.6 °C). Her blood pressure is 106/65 and her pulse is 67. Her respiration is 19 and oxygen saturation is 98%. Body mass index is 59.03 kg/m². Temperature Range: Temp: 97.9 °F (36.6 °C) Temp  Av.9 °F (36.6 °C)  Min: 97.6 °F (36.4 °C)  Max: 98 °F (36.7 °C)  BP Range:  Systolic (36JWL), JLJ:085 , Min:106 , EOL:661     Diastolic (94AON), QQN:09, Min:60, Max:72    Pulse Range: Pulse  Av  Min: 55  Max: 70  Respiration Range: Resp  Av.1  Min: 16  Max: 20  Current Pulse Ox[[id number]]  SpO2: 98 %  24HR Pulse Ox Range:  SpO2  Av.4 %  Min: 95 %  Max: 100 %  Oxygen Amount and Delivery: O2 Flow Rate (L/min): 1.5 L/min    Wt Readings from Last 3 Encounters:   20 (!) 354 lb 11.5 oz (160.9 kg)   20 (!) 320 lb (145.2 kg)   19 (!) 330 lb 0.5 oz (149.7 kg)       I/O (24 Hours)    Intake/Output Summary (Last 24 hours) at 3/17/2020 1357  Last data filed at 3/17/2020 0616  Gross per 24 hour   Intake --   Output 1250 ml   Net -1250 ml       EXAM     General Appearance  Awake, alert, oriented, in no acute distress  HEENT - normocephalic, atraumatic. Neck - Supple,  trachea midline   Lungs -coarse breath sounds no crackles rales or wheeze  Heart Exam:PMI normal. No lifts, heaves, or thrills. RRR. No murmurs, clicks, gallops, or rubs  Abdomen Exam: Abdomen soft, non-tender.    Extremity  Primary osteoarthritis of both knees    Primary cough headache    [facility]) ([facility]    Ventral hernia    Essential hypertension    Intermittent asthma    Anasarca    Acute kidney injury ([address]    Urinary tract infection without hematuria    Primary osteoarthritis of left knee    Hyperkalemia    Acute gastritis without hemorrhage    Open wound of left knee    Delayed surgical wound healing    Status post total left knee replacement    Primary osteoarthritis of right knee    Postprocedural hypotension    Acute kidney injury (MIGUE) with acute tubular necrosis (ATN) ([facility])    Morbid obesity with BMI of 50.0-59.9, adult (HCC)    Arthritis of both knees    Chronic obstructive pulmonary disease (HCC)    Colostomy present ([facility])    Disability of walking    Lymphedema    Obstructive sleep apnea syndrome    Severe depression ([facility])    Stage 2 chronic kidney disease      Will allow admitting team decide on how many days of  Merrem needed for ESBL urinary tract infection. Pulmonary wise.   Discharge to skilled nursing facility appears reasonable      Electronically signed by Pro Malik MD on 3/17/2020 at 1:57 PM

## 2020-03-17 NOTE — PROGRESS NOTES
[address] [facility]    INPATIENT OCCUPATIONAL THERAPY  PROGRESS NOTE  Date: 3/17/2020  Patient Name: Iris May      Room: -  MRN: [id number]    : 1955  (66 y.o.) Gender: female     Discharge Recommendations:  Further Occupational  Therapy is recommended upon facility discharge. Referring Practitioner: Dr Haylee Sanabria   Diagnosis: s/p Right Total Knee Replacement   General  Chart Reviewed: Yes, Orders, Operative Notes, Progress Notes, Previous Admission, History and Physical  Patient assessed for rehabilitation services?: Yes  Additional Pertinent Hx: Past Left Total KNee Replacement Sep-2019  Family / Caregiver Present: No  Referring Practitioner: Dr Haylee Sanabria   Diagnosis: s/p Right Total Knee Replacement     Restrictions  Restrictions/Precautions: Weight Bearing, Surgical Protocols, Fall Risk, Contact Precautions  Implants present? : Metal implants(L TKA/R TKA)  Other position/activity restrictions: Activity orders per Dr Haylee Sanabria  Right Lower Extremity Weight Bearing: Weight Bearing As Tolerated(Full WBAT)  Required Braces or Orthoses?: No      Subjective  Subjective: Pt reporting she plans to obtain hip kit for home. Comments: Pt to SNF this date. Patient Currently in Pain: Yes  Pain Location: Knee  Pain Orientation: Right  Overall Orientation Status: Within Functional Limits  Patient Observation  Observations: colostomy, R LE bandage over incision  Pain Assessment  Pain Assessment: 0-10  Pain Level: 5  Pain Type: Surgical pain  Pain Location: Knee  Pain Orientation: Right  Clinical Progression: Gradually improving  Response to Pain Intervention: Patient Satisfied    Objective        Balance  Sitting Balance: Modified independent   Standing Balance: Stand by assistance(static stand EOB c RW)  Standing Balance  Comment: no LOB      ADL  Feeding: Independent  Grooming: Setup;Stand by assistance  UE Bathing: Setup;Contact guard assistance  LE Bathing:  Moderate assistance;Maximum care needs   Short term goal 3: Participate in care using safe techniques for mobility and self care tasks   Short term goal 4: D/V understanding of Total Knee Program and Interventions for safe care     OT Individual Minutes  Time In: 2889  Time Out: [facility]  Minutes: 12      Electronically signed by LON Abdi on 3/17/20 at 3:34 PM EDT

## 2020-03-17 NOTE — PROGRESS NOTES
Physical Therapy  [facility]  Daily Treatment Note  NAME: Alda Jain  : 1955  MRN: [id number]       20 0915   Restrictions/Precautions   Restrictions/Precautions Weight Bearing;Surgical Protocols; Fall Risk;Contact Precautions   Required Braces or Orthoses? No   Implants present? Metal implants  (L TKA 2019, R TKA 3/10/20)   Lower Extremity Weight Bearing Restrictions   Right Lower Extremity Weight Bearing Weight Bearing As Tolerated  (Full WBAT)   Subjective   Subjective Pt in bed, anxious to get up and sit in chair. Pt agreeable to therapy. General Comment   Comments Pt very pleasant and cooperative. Pain Assessment   Pain Assessment 0-10   Pain Level 5   Patient's Stated Pain Goal No pain   Pain Type Surgical pain   Pain Orientation Right   Clinical Progression Not changed   Response to Pain Intervention Patient Satisfied   Patient Observation   Observations Pt alert and very cooperative. Orientation   Overall Orientation Status WNL   Bed Mobility   Scooting Stand by assistance   Transfers   Sit to Stand Contact guard assistance   Stand to sit Contact guard assistance   Bed to Chair Contact guard assistance   Stand Pivot Transfers Contact guard assistance   Comment v.c to stand tall   Ambulation   Ambulation? Yes   WB Status Full WBAT R LE   More Ambulation? Yes   Ambulation 1   Surface level tile   Device Rolling Walker   Assistance Contact guard assistance   Quality of Gait slow gabriela, small steps, no knee buckling or LOB, pt steady   Gait Deviations Slow Gabriela;Decreased step length   Distance 10'   Comments Constant v.c to correct posture. Ambulation 2   Surface - 2 level tile   Device 2 Rolling Walker   Assistance 2 Contact guard assistance   Quality of Gait 2 Slow and steady, No LOB   Gait Deviations Slow Gabriela;Decreased step length   Distance 30'   Comments Pt became nauseated @ end of treatment session, called nursing to assist pt. Stairs/Curb   Stairs? No   Balance   Posture Good   Sitting - Static Good   Sitting - Dynamic Good   Standing - Static Good;-  (w/RW)   Standing - Dynamic Fair  (w/RW)   Comments Fall risk   Other exercises   Other exercises? Yes   Other exercises 1 TKR Protocol supine x 10   Other exercises 2 TKR Protocol seated x 10   Other exercises 3 Skate x 20   Other exercises 4 Sit>Stand x 3   Other Activities   Other Activities Dangling protocol  (12 mins)   Comment Blayne stretches 89* flex seated/ -4* ext supine   Patient Goals    Patient goals  To go to rehab/get better   Short term goals   Time Frame for Short term goals 3-4 days   Short term goal 1 Pt to demonstrate supervision bed mobility (rolling, supine to sit). Short term goal 2 Pt to demonstrate min x1 sit to supine with good B LE management. Short term goal 3 Pt to improve transfers min/CGA x1. Short term goal 4 Pt to amb 54'-80' with RW, min/CGA x1. Short term goal 5 Pt to improve R knee AROM 0-95 to improve ease of functional mobility. Short term goal 6 Pt to reduce pain to 2-3/10 to improve ease of mobility. Conditions Requiring Skilled Therapeutic Intervention   Body structures, Functions, Activity limitations Decreased functional mobility ; Decreased ADL status; Decreased ROM; Decreased strength;Decreased endurance;Decreased balance; Increased pain   Assessment Pt requiring 2 assist for functional mobility at this time. Pt has no assist at home at d/c and would benefit from continued therapy to maximize pt's functional potential.   Treatment Diagnosis Impaired functional mobility 2* R TKA   Prognosis Good   Decision Making Low Complexity   History KNEE COMPLEX TOTAL ARTHROPLASTY R knee on 3/10/20 by Dr Chico Armenta   Exam ROM, MMT, bed mobility, transfers, amb, balance   Clinical Presentation Pt sleepy, agreeable and cooperative for PT.    Barriers to Learning none   REQUIRES PT FOLLOW UP Yes   Discharge Recommendations Patient would benefit from continued

## 2020-03-17 NOTE — PROGRESS NOTES
Reason for Follow up: MIGUE. Subjective:      I attempted twice to see the patient in her room   Patient is off the floor for midline placement  No reported new events   Discharge planning in process     Scheduled Meds:   predniSONE  10 mg Oral Daily    meropenem  1 g Intravenous Q8H    pantoprazole  40 mg Intravenous Daily    And    sodium chloride (PF)  10 mL Intravenous Daily    heparin (porcine)  5,000 Units Subcutaneous 3 times per day    albuterol  2.5 mg Nebulization Q4H    insulin lispro  0-12 Units Subcutaneous TID WC    insulin lispro  0-6 Units Subcutaneous Nightly    sodium chloride flush  10 mL Intravenous 2 times per day    acetaminophen  650 mg Oral Q6H    sennosides-docusate sodium  1 tablet Oral BID   Continuous Infusions:   dextrose       PRN Meds:benzonatate, glucose, dextrose, glucagon (rDNA), dextrose, fentanNYL, perflutren lipid microspheres, polyethylene glycol, ondansetron, sodium chloride flush, oxyCODONE **OR** [DISCONTINUED] oxyCODONE    Allergies   Allergen Reactions    Shellfish-Derived Products      Hives, nausea       Physical Exam:  Blood pressure 106/65, pulse 67, temperature 97.9 °F (36.6 °C), temperature source Oral, resp. rate 19, height 5' 5\" (1.651 m), weight (!) 354 lb 11.5 oz (160.9 kg), SpO2 98 %.   In: -   Out: 450   In: -   Out: 450 [Urine:300]    Data:  CBC:   Lab Results   Component Value Date    WBC 15.4 (H) 03/17/2020    HGB 9.3 (L) 03/17/2020    HCT 29.2 (L) 03/17/2020    MCV 88.1 03/17/2020     03/17/2020     BMP:    Lab Results   Component Value Date     03/17/2020    K 3.8 03/17/2020     03/17/2020    CO2 26 03/17/2020    BUN 49 (H) 03/17/2020    CREATININE 1.22 (H) 03/17/2020    GLUCOSE 115 (H) 03/17/2020     CMP:   Lab Results   Component Value Date     03/17/2020    K 3.8 03/17/2020     03/17/2020    CO2 26 03/17/2020    BUN 49 (H) 03/17/2020    CREATININE 1.22 (H) 03/17/2020    GLUCOSE 115 (H) 03/17/2020    CALCIUM 8.4 (L) 03/17/2020    PROT 5.3 (L) 03/15/2020    LABALBU 2.6 (L) 03/15/2020    BILITOT 0.58 03/15/2020    ALKPHOS 119 (H) 03/15/2020    AST 16 03/15/2020    ALT <5 (L) 03/15/2020      Hepatic:   Lab Results   Component Value Date    AST 16 03/15/2020    ALT <5 (L) 03/15/2020    BILITOT 0.58 03/15/2020    ALKPHOS 119 (H) 03/15/2020     BNP: No results found for: BNP  Lipids: No results found for: CHOL, HDL  INR:   Lab Results   Component Value Date    INR 1.1 05/21/2019     PTH: No results found for: PTH  Phosphorus:    Lab Results   Component Value Date    PHOS 2.4 (L) 03/17/2020     Ionized Calcium: No results found for: IONCA  Magnesium:   Lab Results   Component Value Date    MG 2.5 03/17/2020     Albumin:   Lab Results   Component Value Date    LABALBU 2.6 (L) 03/15/2020     Last 3 CK, CKMB, Troponin: @LABRCNT(CKTOTAL:3,CKMB:3,TROPONINI:3)       URINE:)No results found for: Adonica El    Radiology:   Reviewed. Assessment:    1. Acute Kidney Injury-likely prerenal. FeNa was 0.9% without urine eosinophils. US showed no hydronephrosis. Cr is better. 2. CKD stage 3.  3. Hypotension. 4. Metabolic acidosis. 5. Colostomy. 6. Hx of hypocalcemia and vit D deficiency. 7. Strangulated colonic hernia. 8. Sepsis. 9. CHF. Plan:    Serum creatinine is improving nicely. Results for Miachel Lefort (MRN [id number]) as of 3/17/2020 15:39   Ref. Range 3/13/2020 18:30 3/14/2020 04:57 3/15/2020 05:31 3/16/2020 05:16 3/17/2020 05:22   Creatinine Latest Ref Range: 0.50 - 0.90 mg/dL 1.97 (H) 1.68 (H) 1.47 (H) 1.36 (H) 1.22 (H)     Good urine output . Hemo-dynamically stable. Antibiotics per primary service. From a renal perspective patient is stable for discharge planning. Avoid hypotension, nephrotoxic drugs, Lovenox, Fleets enema and IV contrast exposure. Follow up labs ordered for AM.     Please do not hesitate to call with questions. We will follow with you.     Electronically signed by Joann Lackey MD  on 3/17/2020 at 3:38 PM  [facility].   Ph: [phone]

## 2020-03-17 NOTE — PROGRESS NOTES
Physical Therapy  Facility/Department: [facility] PROGRESSIVE CARE  Daily Treatment Note  NAME: Johna Prader  : 1955  MRN: [id number]    Date of Service: 3/17/2020    Discharge Recommendations:  Patient would benefit from continued therapy after discharge   PT Equipment Recommendations  Equipment Needed: No    Assessment   Body structures, Functions, Activity limitations: Decreased functional mobility ; Decreased ADL status; Decreased ROM; Decreased strength;Decreased endurance;Decreased balance  Assessment: Pt limited by endurance and strength. Pt very motivated and will benefit from continued therapy to maximize her functional potential.  Specific instructions for Next Treatment: Progress gait/exercise as able, monitor BP prn. Prognosis: Good  History: KNEE COMPLEX TOTAL ARTHROPLASTY R knee on 3/10/20 by Dr Jennifer Piper Presentation: Pt agreeable and cooperative for PT, very pleasant. Barriers to Learning: none known  REQUIRES PT FOLLOW UP: Yes  Activity Tolerance  Activity Tolerance: Patient limited by endurance; Patient limited by fatigue     Patient Diagnosis(es): The encounter diagnosis was Primary osteoarthritis of right knee. has a past medical history of Anemia, Arthritis, Colostomy in place [facility]), Diverticulitis, DJD (degenerative joint disease) of knee, H/O hand fracture, Hypertension, Morbid obesity with BMI of 50.0-59.9, adult ([facility] [address] Vitamin D deficiency, Wears glasses, and Wears partial dentures. has a past surgical history that includes  section; Tonsillectomy; colostomy; Colon surgery; Carpal tunnel release (Right, 2014); Carpal tunnel release (Left, 09/10/2014); Gastric bypass surgery (); Cholecystectomy; Colonoscopy; Hand surgery (Left, 2019); Total knee arthroplasty (Left, 2019); joint replacement (Left, 2019); Total knee arthroplasty (Right, 3/10/2020); and central line (3/12/2020).     Restrictions  Restrictions/Precautions  Restrictions/Precautions: Weight Bearing, Surgical Protocols, Fall Risk, Contact Precautions  Required Braces or Orthoses?: No  Implants present? : Metal implants(L TKA/R TKA)  Lower Extremity Weight Bearing Restrictions  Right Lower Extremity Weight Bearing: Weight Bearing As Tolerated(Full WBAT)  Position Activity Restriction  Other position/activity restrictions: Activity orders per Dr Mega Yancey  Chart Reviewed: Yes  Additional Pertinent Hx: HTN, gastric bypass, hx of L TKA in Sept 2019  Response To Previous Treatment: Patient with no complaints from previous session. Family / Caregiver Present: No  Referring Practitioner: Rhae Sacks, MD  Subjective  Subjective: Pt is pleasant and agreeable to PT. Pt on RA upon arrival. PM: PT is getting ready to be D/C per pt. Pt is agreeable to edu and RLE knee ROM measurement. General Comment  Comments: LAQUITA Branch pt for PT session. Pain Assessment  [place]-Garrett Pain Rating: Hurts little more;Hurts even more  Pain Location: Knee  Pain Orientation: Right  Non-Pharmaceutical Pain Intervention(s): Ambulation/Increased Activity;Cold applied  Response to Pain Intervention: Patient Satisfied  Oxygen Therapy  O2 Device: None (Room air)  Patient Observation  Observations:  IV colostomy, R LE bandage over incision       Orientation  Orientation  Overall Orientation Status: Within Normal Limits  Objective   Bed mobility  Rolling to Right: Stand by assistance  Supine to Sit: Stand by assistance  Sit to Supine: Stand by assistance  Scooting: Stand by assistance  Comment: Pt utilizes bed rails to complete. Transfers  Sit to Stand: Contact guard assistance  Stand to sit: Contact guard assistance  Comment: Pt demos good hand placement with all transfers. All transfers completed with Bariactric RW. No LOB noted. Pt able to transfer from various surfaces this date.    Ambulation  Ambulation?: Yes  WB Status: Full WBAT RLE  Ambulation 1  Surface: level tile  Device: Rolling Walker  Assistance: Contact guard assistance  Quality of Gait: slow gabriela, forward trunk lean. Pt demos increase hip flexion with decrease knee flexion to advance RLE. Pt requires vc's for technique. Pt demos decrease heel strike with LLE and ends stance phase with toe sstrike. Vc's for technique with fair carryover. Gait Deviations: Slow Gabriela  Distance: 70'x2  Comments: Pt's SPO2 ranges % on RA during amb. Ambulation 2  Surface - 2: level tile  Device 2: Rolling Walker  Quality of Gait 2: same as above. Distance: 70'x2  Stairs/Curb  Stairs?: No     Balance  Posture: Good  Sitting - Static: Good  Sitting - Dynamic: Good;-  Standing - Static: Fair;+(with RW)  Standing - Dynamic: Fair(with RW)  Comments: Fall risk, standing balance assessed with RW  Other exercises  Other exercises?: Yes  Other exercises 1: Seated program, blue tband x15 reps, breaks as needed  Other exercises 2: Seated skates to increase RLE ROM. Reps: 15, Manual assistance required to increase knee flexion stretch to pt's tolerance. Other exercises 3: Doffed and Donned cryo-cuff to R knee and educated pt to to have 20 min on/off throughout day to reduce swelling/pain  Other exercises 6: standing BLE ex's with RW for UE support. REps: 10 each. Pt requires a seated rest break between ex's. Other exercises 7: Education provided on BLE ex's to help maintain/increase strength and improve indep. Other exercises 8: bed mob x1. Pt requiresincrease time to complete. Other exercises 9: Seated functional dynamic activity x2. Pt able to don/doff socks while sitting EOB. CGA for safety. Pt able to don tennis shoes with Min A x1. Pt reports having a shoe horn at home to assist.   Other exercises 10: Standing functional dynamic activity x3. Pt able to complete hand washing and washing her face at sink side. Pt requires unilateral UE support for safety. Pt also able to complete pericare standing with unilateral UE support on RW. CGA for safety.     AROM RLE

## 2020-03-17 NOTE — CARE COORDINATION
KRANTHI received Final DC orders for this patient to DC to [address] [place] [address] on this date. KRANTHI set up transportation and informed the staff here, the facility, and KRANTHI also called the patient's sister to inform her of the DC/transport time. The patient is scheduled to be picked up at 2:00pm via wheelchair by Winsome Gatica. KRANTHI provided the number for report to this patient's nurse, Joe Vincent. ([phone]). ADD:  KRANTHI learned that transport had to be cancelled due to an IV antibiotic that she did need to have for 4 more days. KRANTHI checked with [facility] Rome to make sure they could still take her on that medication and they reported that they could. KRANTHI then re set transport and the patient is scheduled to be picked up at 6:30 pm via wheelchair by Winsome Gatica. RN and Clinical lead were notified.

## 2020-03-18 ENCOUNTER — CARE COORDINATION (OUTPATIENT)
Dept: CASE MANAGEMENT | Age: 65
End: 2020-03-18

## 2020-03-18 NOTE — DISCHARGE SUMMARY
[address]                 [address] [place], [address]                               DISCHARGE SUMMARY    PATIENT NAME: Santhosh Ryder                     :        1955  MED REC NO:   [id number]                              ROOM:       2112  ACCOUNT NO:   [de-identified]                           ADMIT DATE: 03/10/2020  PROVIDER:     Cristóbal Ramos DATE: 2020    PRINCIPAL DIAGNOSIS:  Osteoarthritis, knee. COMORBIDITIES:  Include,  1. Ventral hernia. 2.  Asthma. 3.  Morbid obesity. 4.  Post-procedural hypotension. 5.  Acute kidney injury. 6.  Large bowel obstruction. OPERATIONS AND PROCEDURES:  Total knee arthroplasty. HOSPITAL COURSE:  The patient is a 61-year-old female, who presents with  elective total knee replacement. Surgery went well; however, she  developed abdominal distention and pain postoperatively. KUB and CT  scan of the abdomen showed large bowel obstruction. Also, showed a  large ventral hernia. Surgery was consulted and they recommended a wait  and see policy. The patient was monitored and gradually, her colostomy  began to work again and as such, surgery was avoided. She was  maintained on albuterol aerosols throughout her hospital stay. CONDITION ON DISCHARGE:  Stable and improved. MEDICATIONS AND DOSAGE:  Per med reconciliation sheet. ACTIVITY LEVEL:  Per Physical Therapy. DIET:  As tolerated. OFFICE FOLLOWUP NECESSARY:  Yes, in one month. DISPOSITION:  Discharge to [facility].         Aaron GARCIA    D: 2020 10:07:45       T: 2020 13:16:09     [email or web address]  Job#: [id number]     Doc#: [id number]    CC:

## 2020-03-20 LAB
CULTURE: NORMAL
CULTURE: NORMAL
Lab: NORMAL
Lab: NORMAL
SPECIMEN DESCRIPTION: NORMAL
SPECIMEN DESCRIPTION: NORMAL

## 2020-03-24 ENCOUNTER — CARE COORDINATION (OUTPATIENT)
Dept: CASE MANAGEMENT | Age: 65
End: 2020-03-24

## 2020-03-25 ENCOUNTER — CARE COORDINATION (OUTPATIENT)
Dept: CASE MANAGEMENT | Age: 65
End: 2020-03-25

## 2020-03-25 NOTE — CARE COORDINATION
[facility] Update Call    3/25/2020    Patient: Jose Martin Bravo Patient : 1955   MRN: [id number]  Reason for Admission: [address]  Discharge Date: 3/17/20 RARS: Readmission Risk Score: 24         Care Transitions Post Acute Facility Update    Care Transitions Interventions  Post Acute Facility:  Angela Shrestha [facility] Update       Attempted to contact  @ SNF. Message left with request for a discharge plan, date. Contact information provided. Care Transitions will continue to follow.

## 2020-03-30 ENCOUNTER — CARE COORDINATION (OUTPATIENT)
Dept: CASE MANAGEMENT | Age: 65
End: 2020-03-30

## 2020-03-30 PROCEDURE — 1111F DSCHRG MED/CURRENT MED MERGE: CPT

## 2020-03-30 NOTE — CARE COORDINATION
[facility] Update Call    3/30/2020    Patient: Alonso Ta Patient : 1955   MRN: [id number]  Reason for Admission: [facility]  Discharge Date: 3/17/20 RARS: Readmission Risk Score: 24         Care Transitions Post Acute Facility Update    Care Transitions Interventions     Other Services:   (Comment: Sravanthi Gomez)   [address] [place] [address] Dr:  Home from [address] [place] [address] 3/29/20  [address] [place] [address] Dr Harleen Gomez to follow.

## 2020-03-30 NOTE — CARE COORDINATION
further questions/concerns. Is confident with the social isolation related to the COVID-19 restrictions.     Follow Up Home Health   Future Appointments   Date Time Provider Sparkle Magaña   4/23/2020  2:20 PM Brigido Koenig MD [address] [place] [address] Gerson Feliciano RN

## 2020-04-08 ENCOUNTER — CARE COORDINATION (OUTPATIENT)
Dept: CASE MANAGEMENT | Age: 65
End: 2020-04-08

## 2020-04-08 NOTE — CARE COORDINATION
[facility] Transitions Joint Bundle Follow Up Call    2020      Patient Name: Issac Bamberger         Patient : 1955     Discharge Date: 3/17/20    RARS: Readmission Risk Score: Readmission Risk Score: 24    PCP: Dorine Saint, MD                   Spoke with: Steph Ramon    Incision:  healed    Edema: little, ices and elevates    Pain: 7/8 on 0-10 pain scale, has just completed therapy in home    Pain Control Interventions: elevate, rest, Vicodin 1 tablet daily. OTC Tylenol. Bowels/Appetite: ok    Ability to care for basic needs; Shower, meal preparation: ok    Home Care/Out Patient Therapy: [address]    Medication Changes/Questions: denies    Continues to follow COVID-19 precautions.   Future Appointments   Date Time Provider Sparkle Magaña   2020  2:20 PM MD [address]

## 2020-04-14 ENCOUNTER — TELEPHONE (OUTPATIENT)
Dept: ORTHOPEDIC SURGERY | Age: 65
End: 2020-04-14

## 2020-04-14 ENCOUNTER — CARE COORDINATION (OUTPATIENT)
Dept: CASE MANAGEMENT | Age: 65
End: 2020-04-14

## 2020-04-22 ENCOUNTER — OFFICE VISIT (OUTPATIENT)
Dept: ORTHOPEDIC SURGERY | Age: 65
End: 2020-04-22

## 2020-04-22 VITALS — BODY MASS INDEX: 59.03 KG/M2 | HEIGHT: 65 IN

## 2020-04-22 PROCEDURE — 99024 POSTOP FOLLOW-UP VISIT: CPT | Performed by: ORTHOPAEDIC SURGERY

## 2020-04-22 NOTE — PROGRESS NOTES
Raven Thompson M.D.            [address] [place] [address]., [address] [facility],[address], [address] [facility]           Dept Phone: [phone]           Dept Fax:  [phone]           Hiram Dewey          Dept Phone: [phone]           Dept Fax:  [phone]      Chief Compliant:  Chief Complaint   Patient presents with    Post-Op Check     Patient states she is being seen for a post-op follow-op for right knee replacement. History of Present Illness:  Patient returns today status post right TKA times 7 weeks. Patient has no major complaints other than pain down her lower leg along the left lateral aspect. Patient states that she is home now she is getting home physical therapy. She is ambulating with a walker which is a big improvement for her she when she originally arrived here was in a wheelchair with 2 severe varus knees. Review of Systems   Constitutional: Negative for fever, chills, sweats, recent injury, recent illness  Neurological: Negative for Headaches, numbness, or weakness. Integumentary: Negative for rash, itching, ecchymosis, or wounds. Musculoskeletal: Positive for Post-Op Check (Patient states she is being seen for a post-op follow-op for right knee replacement.)       Physical Exam:  Constitutional: Patient is oriented to person, place, and time. Patient appears well-developed and well nourished. Musculoskeletal: Normal gait. Motion 0-100 degrees with expected pain with ROM. No Calf tenderness, Negative Yuan's sign. Neurovascular intact. Neurological: Patient is alert and oriented to person, place, and time. Normal strenght. No sensory deficit. Skin: Skin is warm and dry. Incision is healing well without signs of redness or drainage  Nursing note and vitals reviewed.      Labs and Imaging:     XR taken today: None taken today     No orders of the defined types were placed in this encounter. Assessment and Plan:  1. Arthritis of both knees    2. Primary osteoarthritis of right knee    3. History of total left knee replacement    4. S/P TKR (total knee replacement), right        7 weeks status post right TKA, hinged prosthesis for severe varus gonarthrosis  Status post left total knee hinged prosthesi        This is a 75 y.o. female who presents to the clinic today status post bilateral TKA. Continue home exercise program.  We will see the patient back here in 2 months      Provider Attestation:  Lewis Alvarez, personally performed the services described in this documentation. All medical record entries made by the scribe were at my direction and in my presence. I have reviewed the chart and discharge instructions and agree that the records reflect my personal performance and is accurate and complete. Rani George MD. 04/22/20        Please note that this chart was generated using voice recognition Dragon dictation software. Although every effort was made to ensure the accuracy of this automated transcription, some errors in transcription may have occurred.

## 2020-04-27 ENCOUNTER — CARE COORDINATION (OUTPATIENT)
Dept: CASE MANAGEMENT | Age: 65
End: 2020-04-27

## 2020-05-11 ENCOUNTER — CARE COORDINATION (OUTPATIENT)
Dept: CASE MANAGEMENT | Age: 65
End: 2020-05-11

## 2020-05-11 NOTE — CARE COORDINATION
[facility] Transitions Joint Bundle Follow Up Call    2020      Patient Name: Meredith Noel         Patient : 1955     Discharge Date: 3/17/20    RARS: Readmission Risk Score: Readmission Risk Score: 24    PCP: Hanna Vila MD                   Spoke with: Andrea Moritz    Incision:  healed    Pain: Stiffness    Pain Control Interventions: Occasional Tylenol    Home Care/Out Patient Therapy: [address]  Completed 2020. Is doing in home exercises.     Medication Changes/Questions: denies      Future Appointments   Date Time Provider Sparkle Magaña   2020  2:20 PM MD [address]

## 2020-05-18 ENCOUNTER — CARE COORDINATION (OUTPATIENT)
Dept: CASE MANAGEMENT | Age: 65
End: 2020-05-18

## 2020-05-18 NOTE — CARE COORDINATION
[address] [facility] Transitions Joint Bundle Follow Up Call    2020      Patient Name: Elena Esparza         Patient : 1955     Discharge Date: 3/17/20    RARS: Readmission Risk Score: Readmission Risk Score: 24    PCP: Sharon Ch MD                 Message left. Stated who I was, representing the [facility], Care Transitions Team. Requested to place a call to their Physician with any immediate needs/questions/concerns.       Future Appointments   Date Time Provider Sparkle Magaña   2020  2:20 PM MD [address]

## 2020-06-08 ENCOUNTER — CARE COORDINATION (OUTPATIENT)
Dept: CASE MANAGEMENT | Age: 65
End: 2020-06-08

## 2020-06-08 NOTE — CARE COORDINATION
[address] [facility] Transitions Joint Bundle Follow Up Call    2020      Patient Name: Hunter Pack         Patient : 1955     Discharge Date: 3/17/20    RARS: Readmission Risk Score: Readmission Risk Score: 24    PCP: Olga Cali MD                   Spoke with: Houston Ortega doing, \"really good\". Denies any questions/concerns. Joint Bundle program completed.         Future Appointments   Date Time Provider Sparkle Mensahi   2020  2:20 PM MD [address]

## 2020-06-25 ENCOUNTER — OFFICE VISIT (OUTPATIENT)
Dept: ORTHOPEDIC SURGERY | Age: 65
End: 2020-06-25
Payer: MEDICARE

## 2020-06-25 PROCEDURE — G8428 CUR MEDS NOT DOCUMENT: HCPCS | Performed by: ORTHOPAEDIC SURGERY

## 2020-06-25 PROCEDURE — 1090F PRES/ABSN URINE INCON ASSESS: CPT | Performed by: ORTHOPAEDIC SURGERY

## 2020-06-25 PROCEDURE — 1036F TOBACCO NON-USER: CPT | Performed by: ORTHOPAEDIC SURGERY

## 2020-06-25 PROCEDURE — 4040F PNEUMOC VAC/ADMIN/RCVD: CPT | Performed by: ORTHOPAEDIC SURGERY

## 2020-06-25 PROCEDURE — 1123F ACP DISCUSS/DSCN MKR DOCD: CPT | Performed by: ORTHOPAEDIC SURGERY

## 2020-06-25 PROCEDURE — G8400 PT W/DXA NO RESULTS DOC: HCPCS | Performed by: ORTHOPAEDIC SURGERY

## 2020-06-25 PROCEDURE — 3017F COLORECTAL CA SCREEN DOC REV: CPT | Performed by: ORTHOPAEDIC SURGERY

## 2020-06-25 PROCEDURE — 99213 OFFICE O/P EST LOW 20 MIN: CPT | Performed by: ORTHOPAEDIC SURGERY

## 2020-06-25 PROCEDURE — G8417 CALC BMI ABV UP PARAM F/U: HCPCS | Performed by: ORTHOPAEDIC SURGERY

## 2020-06-25 NOTE — PROGRESS NOTES
Suyapa Alex M.D.            [address] [facility] [address] [facility] [place], [address] [facility]           Dept Phone: [phone]           Dept Fax:  [phone]           Hiram Dewey          Dept Phone: [phone]           Dept Fax:  [phone]      Chief Compliant:  Chief Complaint   Patient presents with    Pain     h/o bilateral TKA        History of Present Illness:  Mac Kelp returns today. This is a 75 y.o. female who presents to the clinic today for evaluation of bilateral TKA. She reports that she is doing well at this time. She is not currently in therapy at this time as she has been doing home exercises. Review of Systems   Constitutional: Negative for fever, chills, sweats, recent illness, or recent injury. Neurological: Negative for headaches, numbness, or weakness. Integumentary: Negative for rash, itching, ecchymosis, abrasions, or laceration. Musculoskeletal: Positive for Pain (h/o bilateral TKA)       Physical Exam:  Constitutional: Patient is oriented to person, place, and time. Patient appears well-developed and well nourished. HENT: Negative otherwise noted  Head: Normocephalic and Atraumatic  Nose: Normal  Eyes: Conjunctivae and EOM are normal  Neck: Normal range of motion Neck supple. Respiratory/Cardio: Effort normal. No respiratory distress. Musculoskeletal:  Left knee: 0-120 degrees. stability is inherent due to hinged knees. Right knee: 0-120 degrees. Neurological: Patient is alert and oriented to person, place, and time. Normal strenght. No sensory deficit. Skin: Skin is warm and dry  Psychiatric: Behavior is normal. Thought content normal.  Nursing note and vitals reviewed. Labs and Imaging:     None taken today      Assessment and Plan:  1.  H/O total knee replacement, bilateral          This is a 75 y.o. female who presents to the clinic today for follow up bilateral TKA. She is doing great at this time as she is not in a lot of pain at this time. She is making great progress at this time. We discussed getting her into outpatient therapy. We will see her back in October 2020. Past History:    Current Outpatient Medications:     albuterol (PROVENTIL) (2.5 MG/3ML) 0.083% nebulizer solution, Take 3 mLs by nebulization every 4 hours (Patient not taking: Reported on 3/30/2020), Disp: 120 each, Rfl: 3    aspirin 325 MG EC tablet, Take 1 tablet by mouth 2 times daily (Patient not taking: Reported on 3/30/2020), Disp: 30 tablet, Rfl: 3    sertraline (ZOLOFT) 50 MG tablet, Take 50 mg by mouth nightly, Disp: , Rfl:     Calcium Carbonate Antacid (TUMS PO), Take 1 tablet by mouth 3 times daily, Disp: , Rfl:     Cholecalciferol (VITAMIN D3) 62530 units CAPS, Take 1 capsule by mouth once a week, Disp: , Rfl:     furosemide (LASIX) 40 MG tablet, Take 40 mg by mouth daily, Disp: , Rfl:     spironolactone (ALDACTONE) 50 MG tablet, Take 50 mg by mouth daily, Disp: , Rfl:     omeprazole (PRILOSEC) 20 MG delayed release capsule, Take 20 mg by mouth daily, Disp: , Rfl:     ferrous sulfate 325 (65 FE) MG tablet, Take 325 mg by mouth daily (with breakfast), Disp: , Rfl:   Allergies   Allergen Reactions    Shellfish-Derived Products      Hives, nausea     Social History     Socioeconomic History    Marital status:       Spouse name: Not on file    Number of children: Not on file    Years of education: Not on file    Highest education level: Not on file   Occupational History    Not on file   Social Needs    Financial resource strain: Not on file    Food insecurity     Worry: Not on file     Inability: Not on file    Transportation needs     Medical: Not on file     Non-medical: Not on file   Tobacco Use    Smoking status: Former Smoker     Packs/day: 0.50     Years: 36.00     Pack years: 18.00     Last attempt to quit: 2012     Years since quittin.3    Smokeless tobacco: Never Used   Substance and Sexual Activity    Alcohol use: No    Drug use: No    Sexual activity: Not on file   Lifestyle    Physical activity     Days per week: Not on file     Minutes per session: Not on file    Stress: Not on file   Relationships    Social connections     Talks on phone: Not on file     Gets together: Not on file     Attends Taoism service: Not on file     Active member of club or organization: Not on file     Attends meetings of clubs or organizations: Not on file     Relationship status: Not on file    Intimate partner violence     Fear of current or ex partner: Not on file     Emotionally abused: Not on file     Physically abused: Not on file     Forced sexual activity: Not on file   Other Topics Concern    Not on file   Social History Narrative    Not on file     Past Medical History:   Diagnosis Date    Anemia     on iron    Arthritis     Colostomy in place [facility])     Diverticulitis     Hx, had a rupture one that ended up in a colostomy    DJD (degenerative joint disease) of knee     H/O hand fracture     right hand was casted, no surgery    Hypertension     Morbid obesity with BMI of 50.0-59.9, adult ([facility] [address]     Vitamin D deficiency     Wears glasses     Wears partial dentures     upper and lower     Past Surgical History:   Procedure Laterality Date    CARPAL TUNNEL RELEASE Right 2014    CARPAL TUNNEL RELEASE Left 09/10/2014    CENTRAL LINE  3/12/2020          SECTION      X2    CHOLECYSTECTOMY      COLON SURGERY      ATTEMPTED TO REVERSE COLOSTOMY UNSUCCESSFUL    COLONOSCOPY      COLOSTOMY      for ruptured diverticuli    GASTRIC BYPASS SURGERY  2000    HAND SURGERY Left 2019    to shave down bone growth    JOINT REPLACEMENT Left 2019    TONSILLECTOMY      TOTAL KNEE ARTHROPLASTY Left 2019    KNEE TOTAL ARTHROPLASTY performed by John Magana MD at [address] [place] [address]

## 2020-07-02 ENCOUNTER — HOSPITAL ENCOUNTER (OUTPATIENT)
Dept: PHYSICAL THERAPY | Facility: CLINIC | Age: 65
Setting detail: THERAPIES SERIES
Discharge: HOME | End: 2020-07-02
Payer: MEDICARE

## 2020-07-02 PROCEDURE — 97110 THERAPEUTIC EXERCISES: CPT

## 2020-07-02 PROCEDURE — 97161 PT EVAL LOW COMPLEX 20 MIN: CPT

## 2020-07-02 NOTE — CONSULTS
[] [facility]) [facility] &  Therapy  [address]  [phone]  F: [phone] [] [address]  [facility]   [address]  P: [phone]  F: [phone] [x] [address] &  Therapy  [address] [facility]  P: [phone]  F: [phone] [] [address]  [facility]   [address]   [place]: [phone]  F: [phone]      Physical Therapy Lower Extremity Evaluation    Date:  2020  Patient: Kurt Wells  : 1955  MRN: [id number]  Physician: Nallely Malik MD Insurance: Medicare, [employer]  Medical Diagnosis: B knee OA, s/p TKA B  Rehab Codes: M17.0, Z96.653, R26.2 NEC, R26.89  Onset date: DOS 3/10/20 R knee, 19 L knee  Next Dr's appt.: prn    Subjective:   CC: Pt is a 75 y.o. female with a history of B TKA, L knee 19 and R knee 3/10/20. She currently reports she has minimal pain, but it increases to 6-7/10 after walking a while. She reports she has moderate difficulty doing her usual light housework, getting into and out of the bath and changing her socks and shoes. She has quite a bit of difficulty with going up or down steps, lifting any objects from the floor or performing light activities around her home. She has been using a wheeled walker to get around most of the time.   HPI: 3/10/20    PMHx: [] Unremarkable [] Diabetes [x] HTN  [] Pacemaker   [] MI/Heart Problems [] Cancer [x] Arthritis [x] Other:stomach problems, colostomy - unable to lay on stomach, depression, morbid obesity, B CTR , 2 c-sections, anemia, gastric bypass               [x] Refer to full medical chart  In EPIC       Comorbidities:   [x] Obesity [] Dialysis  [x] Other:pt has colostomy bag   [] Asthma/COPD [] Dementia [] Other:   [] Stroke [] Sleep apnea [] Other:   [x] Vascular disease [] Rheumatic disease [] Other:     Tests: [] X-Ray: [] MRI:  [x] Other:refer to EPIC  Medications: [x] Refer to full medical record [] None [] Other:  Allergies:      [x] Refer to full medical record [] None [] Other:    Function:  Hand Dominance  [x] Right  [] Left  Patient lives with: In what type of home [x]  One story   [] Two story   [] Split level   Number of stairs to enter    With handrail on the []  Right to enter   [] Left to enter   Bathroom has a []  Tub only  [x] Tub/shower combo   [] Walk in shower    [x]  Grab bars   Washing machine is on [x]  Main level   [] Second level   [] Basement   Employer    Job Status []  Normal duty   [] Light duty   [] Off due to condition    [x]  Retired   [] Not employed   [] Disability  [] Other:  []  Return to work:    Work activities/duties        ADL/IADL Previous level of function Current level of function Who currently assists the patient with task   Bathing  [x] Independent  [] Assist [] Independent  [] Assist    Dress/grooming [x] Independent  [] Assist [x] Independent  [] Assist    Transfer/mobility [x] Independent  [] Assist [x] Independent  [] Assist    Feeding [x] Independent  [] Assist [x] Independent  [] Assist    Toileting [x] Independent  [] Assist [x] Independent  [] Assist    Driving [x] Independent  [] Assist [] Independent  [x] Assist family   Housekeeping [x] Independent  [] Assist [] Independent  [x] Assist Assisted living   Grocery shop/meal prep [x] Independent  [] Assist [] Independent  [x] Assist Assisted living/family     Gait Prior level of function Current level of function    [x] Independent  [] Assist [x] Independent  [] Assist   Device: [] Independent [] Independent    [] Straight Cane [] Quad cane [] Straight Cane [] Quad cane    [] Standard walker [x] Rolling walker   [] 4 wheeled walker [] Standard walker [x] Rolling walker   [] 4 wheeled walker    [] Wheelchair [] Wheelchair     Pain:  [x] Yes  [] No Location: R Pain Rating: (0-10 scale) 2/10, goes up to a 6-7/10 B knees with moving and walking  Pain altered Tx:  [x] Yes  [] No  Action:    Symptoms:  [x] Improving [] Worsening [] Same  Better:  [] AM    [] PM    [x] Sit    [] Rise/Sit    []Stand    [] Walk    [] Lying    [] Other:  Worse: [] AM    [] PM    [] Sit    [] Rise/Sit    [x]Stand    [x] Walk    [] Lying    [] Bend                      [] Valsalva    [] Other:  Sleep: [] OK    [x] Disturbed    Objective:    ROM  ° A/P STRENGTH    Left Right Left Right   Hip Flex WFL WFL 3+ 3   Ext 50% 50% 3- 2+   ER       IR       ABD WFL WFL 3- 3   ADD       Knee Flex 100 100 3+ 4-   Ext 0 0 4+ 5-   Ankle DF WFL WFL 4 4+   PF       INV       EVER                  OBSERVATION No Deficit Deficit Not Tested Comments   Posture       Forward Head [] [x] []    Rounded Shoulders [] [x] []    Kyphosis [] [x] []    Lordosis [] [] []    Lateral Shift [] [x] []    Scoliosis [] [] []    Iliac Crest [] [x] [] R high   PSIS [] [x] [] R ant innominate   ASIS [] [x] [] R ant innominate   Genu Valgus [] [x] []    Genu Varus [] [] []    Genu Recurvatum [] [] []    Pronation [] [x] [] L > R   Supination [] [] []    Leg Length Discrp [] [] []    Slumped Sitting [] [] []    Palpation [] [x] [] Tenderness B incisions, knee at joint line, post knee   Sensation [] [x] [] Numbness B knees   Edema [] [] []    Neurological [x] [] []    Patellar Mobility [] [] []    Patellar Orientation [] [x] [] B lateral tilt   Gait [] [x] [] Analysis:ambulates with wobble-like gait with wheeled walker.  SOB after 25 feet of ambulation       FUNCTION Normal Difficult Unable   Sitting [] [x] []   Standing [] [x] []   Ambulation [] [x] []   Groom/Dress [] [x] []   Lift/Carry [] [x] []   Stairs [] [x] []   Bending [] [x] []   Squat [] [] [x]   Kneel [] [] [x]                                          White Fall Risk Assessment    Patient Name:  Eugenie Bullard  : 1955        Risk Factor Scale  Score   History of Falls [] Yes  [x] No 25  0    Secondary Diagnosis [] Yes  [x] No 15  0    Ambulatory Aid [] Furniture  [] Crutches/cane/walker  [x] None/bedrest/wheelchair/nurse 30  15  0    IV/Heparin Lock [] Yes  [x] No 20  0    Gait/Transferring [] Impaired  [] Weak  [x] Normal/bedrest/immobile 20  10  0    Mental Status [] Forgets limitations  [x] Oriented to own ability 15  0       0     Based on the Assessment score: check the appropriate box. [x]  No intervention needed   Low =   Score of 0-24    []  Use standard prevention interventions Moderate =  Score of 24-44   [] Give patient handout and discuss fall prevention strategies   [] Establish goal of education for patient/family RE: fall prevention strategies    []  Use high risk prevention interventions High = Score of 45 and higher   [] Give patient handout and discuss fall prevention strategies   [] Establish goal of education for patient/family Re: fall prevention strategies   [] Discuss lifeline / other resources    Electronically signed by:   Jaden Barrera PT  Date: 8/31/2020        Functional Test: LEFS 24/80 Score: 70% functionally impaired     Comments: Pt reports she has frequent LOB without her walker, so she uses it most of the time. Fatigued with some SOB with most ex and testing    Assessment:  Patient would benefit from skilled physical therapy services in order to: increase strength, endurance, function and maximize ROM. Problems:    [x] ? Pain:  [x] ? ROM:  [x] ? Strength:  [x] ? Function:  [x] Other:  LOB     STG: (to be met in 10 treatments)  1. ? Pain:by any with ambulation  2. ? ROM: to WFLs B knees  3. ? Strength: by 1/2 m.grade B LEs  4. ? Function: per pt report  5. Patient to be independent with home exercise program as demonstrated by performance with correct form without cues. 6. Demonstrate Knowledge of fall prevention  LTG: (to be met in 18+ treatments)  1. Minimize pain with ambulation of community distances  2. Increase strength B LEs to WFLs  3.  Safe ambulation with straight cane or without device. 4. Maximize ADL function                 Patient goals: walk without walker, no wobbling    Rehab Potential:  [] Good  [x] Fair  [] Poor   Suggested Professional Referral:  [x] No  [] Yes:  Barriers to Goal Achievement:  [] No  [x] Yes: morbid obesity  Domestic Concerns:  [x] No  [] Yes:    Pt. Education:  [x] Plans/Goals, Risks/Benefits discussed  [x] Home exercise program    Method of Education: [x] Verbal  [x] Demo  [] Written  Comprehension of Education:  [x] Verbalizes understanding. [x] Demonstrates understanding. [] Needs Review. [x] Demonstrates/verbalizes understanding of HEP/Ed previously given. Treatment Plan:  [x] Therapeutic Exercise   06286  [] Iontophoresis: 4 mg/mL Dexamethasone Sodium Phosphate  mAmin  01349   [x] Therapeutic Activity  28526 [x] Vasopneumatic cold with compression  67098    [x] Gait Training   16928 [] Ultrasound   20014   [x] Neuromuscular Re-education  71807 [] Electrical Stimulation Unattended  71053   [x] Manual Therapy  00702 [] Electrical Stimulation Attended  79650   [x] Instruction in HEP  [] Lumbar/Cervical Traction  21877   [x] Aquatic Therapy   66224 [x] Cold/hotpack    [] Massage   33149      [] Dry Needling, 1 or 2 muscles  05144   [] Biofeedback, first 15 minutes   83140  [] Biofeedback, additional 15 minutes   01317 [] Dry Needling, 3 or more muscles  52487     []  Medication allergies reviewed for use of    Dexamethasone Sodium Phosphate 4mg/ml     with iontophoresis treatments. Pt is not allergic.     Frequency:  2 x/week for 18+ visits        Todays Treatment:  Modalities:   Precautions:colostomy - unable to lay on stomach  Exercises:  Exercise Reps/ Time Weight/ Level Comments   MAT      SLR, B 10  HEP   Heel slides/quad sets R 10     Add/abd      BARS - marching 15  UE s for balance   3 way SLR B 10  UE s for balance   Heel raises next     FW/BW walk 3  W/ UEs   S to S walk 5  Cues for trunk control; UEs for balance   Step ups 8 4\" UE s for balance/some WB, cues for avoiding circumduction R LE,          Ambulation with wheeled walker 2 50ft Cues for posture   Sit to stand next                 Sci fit 8 min 1.0    Other:    Specific Instructions for next treatment: Progress ex, strengthening as tolerated, progress endurance, ROM and function, modalities prn. Evaluation Complexity:  History (Personal factors, comorbidities) [] 0 [x] 1-2 [] 3+   Exam (limitations, restrictions) [x] 1-2 [] 3 [] 4+   Clinical presentation (progression) [x] Stable [] Evolving  [] Unstable   Decision Making [x] Low [] Moderate [] High    [x] Low Complexity [] Moderate Complexity [] High Complexity       Treatment Charges: Mins Units   [x] Evaluation       [x]  Low       []  Moderate       []  High 15 1   []  Modalities     [x]  Ther Exercise 40 3   []  Manual Therapy     []  Ther Activities     []  Aquatics     []  Vasocompression     []  Other       TOTAL TREATMENT TIME: 54    Time in:1005 Time Out:1100    Electronically signed by: Rafia Mcwilliams PT        Physician Signature:________________________________Date:__________________  By signing above or cosigning this note, I have reviewed this plan of care and certify a need for medically necessary rehabilitation services.      *PLEASE SIGN ABOVE AND FAX BACK ALL PAGES*

## 2020-07-02 NOTE — FLOWSHEET NOTE
Medicare Cap   [x] Physical Therapy  [] Speech Therapy  [] Occupational therapy  *PT and Speech caps combine      $2040 Cap limit < kx modifier needed        Patient Name: Melvin Fermin: 1955    Note:  This is an estimate of charges billed.      Date of [address] Name # units/ charge $$$ charge Daily Total Charge Ongoing Total $$$   7/2/20 eval low   82.82     TE3 23.22*3  69.66 152.48

## 2020-07-07 ENCOUNTER — HOSPITAL ENCOUNTER (OUTPATIENT)
Dept: PHYSICAL THERAPY | Facility: CLINIC | Age: 65
Setting detail: THERAPIES SERIES
Discharge: HOME | End: 2020-07-07
Payer: MEDICARE

## 2020-07-10 ENCOUNTER — HOSPITAL ENCOUNTER (OUTPATIENT)
Dept: PHYSICAL THERAPY | Facility: CLINIC | Age: 65
Setting detail: THERAPIES SERIES
Discharge: HOME | End: 2020-07-10
Payer: MEDICARE

## 2020-07-10 NOTE — FLOWSHEET NOTE
[] [address]  [address]    [phone]  F: [phone]   [] [address] [address]   [address]  P: [phone]  F: [phone]  [x] [place]  [address] [facility]  P: [phone]  F: [phone]  [] [address] [facility] [address]   [place]: [phone]  F: [phone]   [] [address]  [facility]: [phone]   F: [phone]     Physical Therapy Cancel/No Show note    Date: 7/10/2020  Patient: Kin Lazaro  : 1955  MRN: [id number]    Cancels/No Shows to date: 0  For today's appointment patient:    [x]  Cancelled    [] Rescheduled appointment    [] No-show     Reason given by patient:    [x]  Patient ill    []  Conflicting appointment    [] No transportation      [] Conflict with work    [] No reason given    [] Weather related    [] [id number]    [] Other:      Comments:  Suffering from allergies       [x] Next appointment was confirmed    Electronically signed by: Adrian Yang PTA

## 2020-07-14 ENCOUNTER — HOSPITAL ENCOUNTER (OUTPATIENT)
Dept: PHYSICAL THERAPY | Facility: CLINIC | Age: 65
Setting detail: THERAPIES SERIES
Discharge: HOME | End: 2020-07-14
Payer: MEDICARE

## 2020-07-14 NOTE — FLOWSHEET NOTE
[] [address]  [address]    [phone]  F: [phone]   [] [address] [address]   [address]  P: [phone]  F: [phone]  [x] [place]  [address] [facility]  P: [phone]  F: [phone]  [] [address]  [facility]   [address]   [place]: [phone]  F: [phone]   [] [address]  [place]: [phone]   F: [phone]     Physical Therapy Cancel/No Show note    Date: 2020  Patient: Kimberlee Marc  : 1955  MRN: [id number]    Cancels/No Shows to date: 0  For today's appointment patient:    [x]  Cancelled    [] Rescheduled appointment    [] No-show     Reason given by patient:    [x]  Patient ill    []  Conflicting appointment    [] No transportation      [] Conflict with work    [] No reason given    [] Weather related    [] COVID-19    [] Other:      Comments:  Daughter called to cx, stated mom is really ill.        [x] Next appointment was confirmed    Electronically signed by: Jacy Quevedo 116

## 2020-07-17 ENCOUNTER — APPOINTMENT (OUTPATIENT)
Dept: PHYSICAL THERAPY | Facility: CLINIC | Age: 65
End: 2020-07-17
Payer: MEDICARE

## 2020-07-21 ENCOUNTER — HOSPITAL ENCOUNTER (OUTPATIENT)
Dept: PHYSICAL THERAPY | Facility: CLINIC | Age: 65
Setting detail: THERAPIES SERIES
Discharge: HOME | End: 2020-07-21
Payer: MEDICARE

## 2020-07-21 NOTE — FLOWSHEET NOTE
[] [address]  [address]    [phone]  F: [phone]   [] [address] [facility] [address]  [facility] [address]   [address]  P: [phone]  F: [phone]  [x] [place]  [address] [facility]  P: [phone]  F: [phone]  [] [address]  [facility]   [address]   [place]: [phone]  F: [phone]   [] [address]  [place]: [phone]   F: [phone]     Physical Therapy Cancel/No Show note    Date: 2020  Patient: Kimberlee Marc  : 1955  MRN: [id number]    Cancels/No Shows to date:   For today's appointment patient:    []  Cancelled    [] Rescheduled appointment    [x] No-show     Reason given by patient:    []  Patient ill    []  Conflicting appointment    [] No transportation      [] Conflict with work    [] No reason given    [] Weather related    [] [id number]    [] Other:      Comments:        [x] Next appointment was confirmed    Electronically signed by: Amy Ferguson PTA

## 2020-07-24 ENCOUNTER — APPOINTMENT (OUTPATIENT)
Dept: PHYSICAL THERAPY | Facility: CLINIC | Age: 65
End: 2020-07-24
Payer: MEDICARE

## 2020-10-29 ENCOUNTER — OFFICE VISIT (OUTPATIENT)
Dept: ORTHOPEDIC SURGERY | Age: 65
End: 2020-10-29
Payer: MEDICARE

## 2020-10-29 VITALS — BODY MASS INDEX: 48.82 KG/M2 | WEIGHT: 293 LBS | HEIGHT: 65 IN

## 2020-10-29 PROCEDURE — 1090F PRES/ABSN URINE INCON ASSESS: CPT | Performed by: ORTHOPAEDIC SURGERY

## 2020-10-29 PROCEDURE — 99213 OFFICE O/P EST LOW 20 MIN: CPT | Performed by: ORTHOPAEDIC SURGERY

## 2020-10-29 PROCEDURE — 4040F PNEUMOC VAC/ADMIN/RCVD: CPT | Performed by: ORTHOPAEDIC SURGERY

## 2020-10-29 PROCEDURE — G8417 CALC BMI ABV UP PARAM F/U: HCPCS | Performed by: ORTHOPAEDIC SURGERY

## 2020-10-29 PROCEDURE — 3017F COLORECTAL CA SCREEN DOC REV: CPT | Performed by: ORTHOPAEDIC SURGERY

## 2020-10-29 PROCEDURE — G8400 PT W/DXA NO RESULTS DOC: HCPCS | Performed by: ORTHOPAEDIC SURGERY

## 2020-10-29 PROCEDURE — 1036F TOBACCO NON-USER: CPT | Performed by: ORTHOPAEDIC SURGERY

## 2020-10-29 PROCEDURE — 1123F ACP DISCUSS/DSCN MKR DOCD: CPT | Performed by: ORTHOPAEDIC SURGERY

## 2020-10-29 PROCEDURE — G8427 DOCREV CUR MEDS BY ELIG CLIN: HCPCS | Performed by: ORTHOPAEDIC SURGERY

## 2020-10-29 PROCEDURE — G8484 FLU IMMUNIZE NO ADMIN: HCPCS | Performed by: ORTHOPAEDIC SURGERY

## 2020-10-29 NOTE — PROGRESS NOTES
Noel Castleman M.D.            [address] [facility] [address] [facility],[address], [facility] [address]           Dept Phone: [phone]           Dept Fax:  [phone]           [place] Dewey          Dept Phone: [phone]           Dept Fax:  [phone]      Chief Compliant:  Chief Complaint   Patient presents with    Follow-up     bilateral TKA DOS 3/10/20        History of Present Illness: This is a 75 y.o. female who presents to the clinic today for evaluation / follow up of status post bilateral total knees with bilateral hinged knee prosthesis for severe varus gonarthrosis of both knees. Patient states that she really has a new life. She is ambulating on a regular basis. She still uses a walker but she is getting better and better and starting to wean herself from this. The only complaint she has is a hint of left thigh pain but otherwise she has no pain in her knees. Review of Systems   Constitutional: Negative for fever, chills, sweats. Eyes: Negative for changes in vision, or pain. HENT: Negative for ear ache, epistaxis, or sore throat. Respiratory/Cardio: Negative for Chest pain, palpitations, SOB, or cough. Gastrointestinal: Negative for abdominal pain, N/V/D. Genitourinary: Negative for dysuria, frequency, urgency, or hematuria. Neurological: Negative for headache, numbness, or weakness. Integumentary: Negative for rash, itching, laceration, or abrasion. Musculoskeletal: Positive for Follow-up (bilateral TKA DOS 3/10/20)       Physical Exam:  Constitutional: Patient is oriented to person, place, and time. Patient appears well-developed and well nourished. HENT: Negative otherwise noted  Head: Normocephalic and Atraumatic  Nose: Normal  Eyes: Conjunctivae and EOM are normal  Neck: Normal range of motion Neck supple. Respiratory/Cardio: Effort normal. No respiratory distress. Musculoskeletal: Examination of both knees are identical.  Her motion is excellent 0-100 degrees. She obviously has good varus and valgus stability have a hinged knees. She has no pain whatsoever this. She has a little bit of tenderness in the mid thigh where she states that she had this happens to be  Neurological: Patient is alert and oriented to person, place, and time. Normal strenght. No sensory deficit. Skin: Skin is warm and dry  Psychiatric: Behavior is normal. Thought content normal.  Nursing note and vitals reviewed. Labs and Imaging:     XR taken today:  No results found. No orders of the defined types were placed in this encounter. Assessment and Plan:  1. Arthritis of both knees    2. H/O total knee replacement, bilateral            This is a 75 y.o. female who presents to the clinic today for evaluation / follow up of status post bilateral total knees hinged prostheses.      Past History:    Current Outpatient Medications:     albuterol (PROVENTIL) (2.5 MG/3ML) 0.083% nebulizer solution, Take 3 mLs by nebulization every 4 hours, Disp: 120 each, Rfl: 3    aspirin 325 MG EC tablet, Take 1 tablet by mouth 2 times daily, Disp: 30 tablet, Rfl: 3    sertraline (ZOLOFT) 50 MG tablet, Take 50 mg by mouth nightly, Disp: , Rfl:     Calcium Carbonate Antacid (TUMS PO), Take 1 tablet by mouth 3 times daily, Disp: , Rfl:     Cholecalciferol (VITAMIN D3) 58218 units CAPS, Take 1 capsule by mouth once a week, Disp: , Rfl:     furosemide (LASIX) 40 MG tablet, Take 40 mg by mouth daily, Disp: , Rfl:     spironolactone (ALDACTONE) 50 MG tablet, Take 50 mg by mouth daily, Disp: , Rfl:     omeprazole (PRILOSEC) 20 MG delayed release capsule, Take 20 mg by mouth daily, Disp: , Rfl:     ferrous sulfate 325 (65 FE) MG tablet, Take 325 mg by mouth daily (with breakfast), Disp: , Rfl:   Allergies   Allergen Reactions    Shellfish-Derived Products      Hives, nausea     Social History     Socioeconomic History    Marital status:       Spouse name: Not on file    Number of children: Not on file    Years of education: Not on file    Highest education level: Not on file   Occupational History    Not on file   Social Needs    Financial resource strain: Not on file    Food insecurity     Worry: Not on file     Inability: Not on file    Transportation needs     Medical: Not on file     Non-medical: Not on file   Tobacco Use    Smoking status: Former Smoker     Packs/day: 0.50     Years: 36.00     Pack years: 18.00     Last attempt to quit: 2012     Years since quittin.6    Smokeless tobacco: Never Used   Substance and Sexual Activity    Alcohol use: No    Drug use: No    Sexual activity: Not on file   Lifestyle    Physical activity     Days per week: Not on file     Minutes per session: Not on file    Stress: Not on file   Relationships    Social connections     Talks on phone: Not on file     Gets together: Not on file     Attends Denominational service: Not on file     Active member of club or organization: Not on file     Attends meetings of clubs or organizations: Not on file     Relationship status: Not on file    Intimate partner violence     Fear of current or ex partner: Not on file     Emotionally abused: Not on file     Physically abused: Not on file     Forced sexual activity: Not on file   Other Topics Concern    Not on file   Social History Narrative    Not on file     Past Medical History:   Diagnosis Date    Anemia     on iron    Arthritis     Colostomy in place [facility])     Diverticulitis     Hx, had a rupture one that ended up in a colostomy    DJD (degenerative joint disease) of knee     H/O hand fracture     right hand was casted, no surgery    Hypertension     Morbid obesity with BMI of 50.0-59.9, adult ([facility] [address]     Vitamin D deficiency     Wears glasses     Wears partial dentures     upper and lower Past Surgical History:   Procedure Laterality Date    CARPAL TUNNEL RELEASE Right 2014    CARPAL TUNNEL RELEASE Left 09/10/2014    CENTRAL LINE  3/12/2020          SECTION      X2    CHOLECYSTECTOMY      COLON SURGERY      ATTEMPTED TO REVERSE COLOSTOMY UNSUCCESSFUL    COLONOSCOPY      COLOSTOMY      for ruptured diverticuli    GASTRIC BYPASS SURGERY      HAND SURGERY Left 2019    to shave down bone growth    JOINT REPLACEMENT Left 2019    TONSILLECTOMY      TOTAL KNEE ARTHROPLASTY Left 2019    KNEE TOTAL ARTHROPLASTY performed by Everette Roldan MD at [address] Right 3/10/2020    KNEE COMPLEX TOTAL ARTHROPLASTY performed by Everette Roldan MD at [address]     Family History   Problem Relation Age of Onset    Diabetes Mother     Heart Disease Father     Diabetes Father     Heart Failure Father       Monica Sorto  I informed patient is not uncommon to have a little bit of tenderness at the end of the stem tip which most likely is her condition. Basically told is really not much to do for this and she is okay with this that she says she is doing better than she has in years. I encouraged her to continue exercises and weight loss. We will see her back here in 1 year    Provider Attestation:  Domitila Arnold, personally performed the services described in this documentation. All medical record entries made by the scribe were at my direction and in my presence. I have reviewed the chart and discharge instructions and agree that the records reflect my personal performance and is accurate and complete. Everette Roldan MD. 10/29/20      Please note that this chart was generated using voice recognition Dragon dictation software. Although every effort was made to ensure the accuracy of this automated transcription, some errors in transcription may have occurred.

## 2021-07-27 ENCOUNTER — HOSPITAL ENCOUNTER (OUTPATIENT)
Age: 66
Discharge: HOME | End: 2021-07-27
Payer: MEDICARE

## 2021-07-27 LAB
ABSOLUTE EOS #: 0.27 K/UL (ref 0–0.44)
ABSOLUTE IMMATURE GRANULOCYTE: 0.03 K/UL (ref 0–0.3)
ABSOLUTE LYMPH #: 1.57 K/UL (ref 1.1–3.7)
ABSOLUTE MONO #: 0.32 K/UL (ref 0.1–1.2)
ALBUMIN SERPL-MCNC: 4 G/DL (ref 3.5–5.2)
ALBUMIN/GLOBULIN RATIO: 1.5 (ref 1–2.5)
ALP BLD-CCNC: 235 U/L (ref 35–104)
ALT SERPL-CCNC: 15 U/L (ref 5–33)
ANION GAP SERPL CALCULATED.3IONS-SCNC: 16 MMOL/L (ref 9–17)
AST SERPL-CCNC: 24 U/L
BASOPHILS # BLD: 1 % (ref 0–2)
BASOPHILS ABSOLUTE: 0.04 K/UL (ref 0–0.2)
BILIRUB SERPL-MCNC: 0.61 MG/DL (ref 0.3–1.2)
BNP INTERPRETATION: ABNORMAL
BUN BLDV-MCNC: 44 MG/DL (ref 8–23)
BUN/CREAT BLD: ABNORMAL (ref 9–20)
CALCIUM SERPL-MCNC: 8.9 MG/DL (ref 8.6–10.4)
CHLORIDE BLD-SCNC: 111 MMOL/L (ref 98–107)
CO2: 17 MMOL/L (ref 20–31)
CREAT SERPL-MCNC: 1.53 MG/DL (ref 0.5–0.9)
DIFFERENTIAL TYPE: ABNORMAL
EOSINOPHILS RELATIVE PERCENT: 4 % (ref 1–4)
GFR AFRICAN AMERICAN: 41 ML/MIN
GFR NON-AFRICAN AMERICAN: 34 ML/MIN
GFR SERPL CREATININE-BSD FRML MDRD: ABNORMAL ML/MIN/{1.73_M2}
GFR SERPL CREATININE-BSD FRML MDRD: ABNORMAL ML/MIN/{1.73_M2}
GLUCOSE BLD-MCNC: 94 MG/DL (ref 70–99)
HCT VFR BLD CALC: 37.9 % (ref 36.3–47.1)
HEMOGLOBIN: 11.1 G/DL (ref 11.9–15.1)
IMMATURE GRANULOCYTES: 1 %
LYMPHOCYTES # BLD: 25 % (ref 24–43)
MCH RBC QN AUTO: 28.8 PG (ref 25.2–33.5)
MCHC RBC AUTO-ENTMCNC: 29.3 G/DL (ref 28.4–34.8)
MCV RBC AUTO: 98.2 FL (ref 82.6–102.9)
MONOCYTES # BLD: 5 % (ref 3–12)
NRBC AUTOMATED: 0 PER 100 WBC
PDW BLD-RTO: 14.1 % (ref 11.8–14.4)
PLATELET # BLD: 215 K/UL (ref 138–453)
PLATELET ESTIMATE: ABNORMAL
PMV BLD AUTO: 10.2 FL (ref 8.1–13.5)
POTASSIUM SERPL-SCNC: 4.9 MMOL/L (ref 3.7–5.3)
PRO-BNP: 407 PG/ML
RBC # BLD: 3.86 M/UL (ref 3.95–5.11)
RBC # BLD: ABNORMAL 10*6/UL
SEG NEUTROPHILS: 64 % (ref 36–65)
SEGMENTED NEUTROPHILS ABSOLUTE COUNT: 4.01 K/UL (ref 1.5–8.1)
SODIUM BLD-SCNC: 144 MMOL/L (ref 135–144)
TOTAL PROTEIN: 6.7 G/DL (ref 6.4–8.3)
WBC # BLD: 6.2 K/UL (ref 3.5–11.3)
WBC # BLD: ABNORMAL 10*3/UL

## 2021-07-27 PROCEDURE — 36415 COLL VENOUS BLD VENIPUNCTURE: CPT

## 2021-07-27 PROCEDURE — 85025 COMPLETE CBC W/AUTO DIFF WBC: CPT

## 2021-07-27 PROCEDURE — 80053 COMPREHEN METABOLIC PANEL: CPT

## 2021-07-27 PROCEDURE — 83880 ASSAY OF NATRIURETIC PEPTIDE: CPT

## 2021-10-29 ENCOUNTER — OFFICE VISIT (OUTPATIENT)
Dept: ORTHOPEDIC SURGERY | Age: 66
End: 2021-10-29
Payer: MEDICARE

## 2021-10-29 DIAGNOSIS — M25.561 PAIN IN BOTH KNEES, UNSPECIFIED CHRONICITY: Primary | ICD-10-CM

## 2021-10-29 DIAGNOSIS — M25.562 PAIN IN BOTH KNEES, UNSPECIFIED CHRONICITY: Primary | ICD-10-CM

## 2021-10-29 PROCEDURE — 1123F ACP DISCUSS/DSCN MKR DOCD: CPT | Performed by: ORTHOPAEDIC SURGERY

## 2021-10-29 PROCEDURE — G8417 CALC BMI ABV UP PARAM F/U: HCPCS | Performed by: ORTHOPAEDIC SURGERY

## 2021-10-29 PROCEDURE — 3017F COLORECTAL CA SCREEN DOC REV: CPT | Performed by: ORTHOPAEDIC SURGERY

## 2021-10-29 PROCEDURE — G8484 FLU IMMUNIZE NO ADMIN: HCPCS | Performed by: ORTHOPAEDIC SURGERY

## 2021-10-29 PROCEDURE — 4040F PNEUMOC VAC/ADMIN/RCVD: CPT | Performed by: ORTHOPAEDIC SURGERY

## 2021-10-29 PROCEDURE — G8428 CUR MEDS NOT DOCUMENT: HCPCS | Performed by: ORTHOPAEDIC SURGERY

## 2021-10-29 PROCEDURE — G8400 PT W/DXA NO RESULTS DOC: HCPCS | Performed by: ORTHOPAEDIC SURGERY

## 2021-10-29 PROCEDURE — 1036F TOBACCO NON-USER: CPT | Performed by: ORTHOPAEDIC SURGERY

## 2021-10-29 PROCEDURE — 99213 OFFICE O/P EST LOW 20 MIN: CPT | Performed by: ORTHOPAEDIC SURGERY

## 2021-10-29 PROCEDURE — 1090F PRES/ABSN URINE INCON ASSESS: CPT | Performed by: ORTHOPAEDIC SURGERY

## 2021-10-29 NOTE — PROGRESS NOTES
Respiratory/Cardio: Effort normal. No respiratory distress. Musculoskeletal: Emanation of right knee notes motion 0 to 100 degrees she has excellent stability obvious apprehension component good patellar tracking no other findings    Left knee examination essentially identical.    Neurological: Patient is alert and oriented to person, place, and time. Normal strenght. No sensory deficit. Skin: Skin is warm and dry  Psychiatric: Behavior is normal. Thought content normal.  Nursing note and vitals reviewed. Labs and Imaging:     XR taken today:  XR KNEE BILATERAL STANDING    Result Date: 10/29/2021  X-rays taken today and reviewed by me show standing AP and lateral of patient's both knees. Patient status post bilateral total knee arthroplasties with hinged knee components secondary to severe degenerative joint disease of previous knees. AP and lateral views of both knees shows components in excellent position with stemmed components both femoral tibial sides. No evidence for loosening or component implant problems are seen. Orders Placed This Encounter   Procedures    XR KNEE BILATERAL STANDING     Standing Status:   Future     Number of Occurrences:   1     Standing Expiration Date:   10/28/2022       Assessment and Plan:  1. Pain in both knees, unspecified chronicity    2. Status post bilateral total knees for severe degenerative joint disease both knees      This is a 77 y.o. female who presents to the clinic today for evaluation / follow up of status post bilateral total knee replacement.      Past History:    Current Outpatient Medications:     albuterol (PROVENTIL) (2.5 MG/3ML) 0.083% nebulizer solution, Take 3 mLs by nebulization every 4 hours, Disp: 120 each, Rfl: 3    aspirin 325 MG EC tablet, Take 1 tablet by mouth 2 times daily, Disp: 30 tablet, Rfl: 3    sertraline (ZOLOFT) 50 MG tablet, Take 50 mg by mouth nightly, Disp: , Rfl:     Calcium Carbonate Antacid (TUMS PO), Take 1  Attends Club or Organization Meetings:     Marital Status:    Intimate Partner Violence:     Fear of Current or Ex-Partner:     Emotionally Abused:     Physically Abused:     Sexually Abused:      Past Medical History:   Diagnosis Date    Anemia     on iron    Arthritis     [facility] [address]     Diverticulitis     Hx, had a rupture one that ended up in a colostomy    DJD (degenerative joint disease) of knee     H/O hand fracture     right hand was casted, no surgery    Hypertension     Morbid obesity with BMI of 50.0-59.9, adult ([facility] [address]     Vitamin D deficiency     Wears glasses     Wears partial dentures     upper and lower     Past Surgical History:   Procedure Laterality Date    CARPAL TUNNEL RELEASE Right 2014    CARPAL TUNNEL RELEASE Left 09/10/2014    CENTRAL LINE  3/12/2020          SECTION      X2    CHOLECYSTECTOMY      COLON SURGERY      ATTEMPTED TO REVERSE COLOSTOMY UNSUCCESSFUL    COLONOSCOPY      COLOSTOMY      for ruptured diverticuli    GASTRIC BYPASS SURGERY      HAND SURGERY Left 2019    to shave down bone growth    JOINT REPLACEMENT Left 2019    TONSILLECTOMY      TOTAL KNEE ARTHROPLASTY Left 2019    KNEE TOTAL ARTHROPLASTY performed by Enrique Salinas MD at [facility] Right 3/10/2020    KNEE COMPLEX TOTAL ARTHROPLASTY performed by Enrique Salinas MD at [address]     Family History   Problem Relation Age of Onset    Diabetes Mother     Heart Disease Father     Diabetes Father     Heart Failure Father    Plan  Patient is very happy with the results of her knees. Encouraged her to continue with exercises. She feels the necessary use a walker because of balance issues otherwise she is doing beautifully. We will see her back here in 2 years      Provider Attestation:  Court Vallejo, personally performed the services described in this documentation.  All medical record entries made by the carlene were at my direction and in my presence. I have reviewed the chart and discharge instructions and agree that the records reflect my personal performance and is accurate and complete. Mayda Gallegos MD. 10/29/21      Please note that this chart was generated using voice recognition Dragon dictation software. Although every effort was made to ensure the accuracy of this automated transcription, some errors in transcription may have occurred.

## 2022-04-15 ENCOUNTER — HOSPITAL ENCOUNTER (OUTPATIENT)
Age: 67
Setting detail: SPECIMEN
Discharge: HOME | End: 2022-04-15
Payer: MEDICARE

## 2022-04-15 LAB
ABSOLUTE EOS #: 0.25 K/UL (ref 0–0.44)
ABSOLUTE IMMATURE GRANULOCYTE: 0.04 K/UL (ref 0–0.3)
ABSOLUTE LYMPH #: 1.39 K/UL (ref 1.1–3.7)
ABSOLUTE MONO #: 0.56 K/UL (ref 0.1–1.2)
BASOPHILS # BLD: 1 % (ref 0–2)
BASOPHILS ABSOLUTE: 0.05 K/UL (ref 0–0.2)
EOSINOPHILS RELATIVE PERCENT: 4 % (ref 1–4)
HCT VFR BLD CALC: 36.8 % (ref 36.3–47.1)
HEMOGLOBIN: 11 G/DL (ref 11.9–15.1)
IMMATURE GRANULOCYTES: 1 %
LYMPHOCYTES # BLD: 21 % (ref 24–43)
MCH RBC QN AUTO: 29 PG (ref 25.2–33.5)
MCHC RBC AUTO-ENTMCNC: 29.9 G/DL (ref 28.4–34.8)
MCV RBC AUTO: 97.1 FL (ref 82.6–102.9)
MONOCYTES # BLD: 8 % (ref 3–12)
NRBC AUTOMATED: 0 PER 100 WBC
PDW BLD-RTO: 13.4 % (ref 11.8–14.4)
PLATELET # BLD: 205 K/UL (ref 138–453)
PMV BLD AUTO: 10.3 FL (ref 8.1–13.5)
RBC # BLD: 3.79 M/UL (ref 3.95–5.11)
SEG NEUTROPHILS: 65 % (ref 36–65)
SEGMENTED NEUTROPHILS ABSOLUTE COUNT: 4.46 K/UL (ref 1.5–8.1)
WBC # BLD: 6.8 K/UL (ref 3.5–11.3)

## 2022-04-15 PROCEDURE — 85025 COMPLETE CBC W/AUTO DIFF WBC: CPT

## 2022-04-15 PROCEDURE — 80053 COMPREHEN METABOLIC PANEL: CPT

## 2022-04-15 PROCEDURE — 83880 ASSAY OF NATRIURETIC PEPTIDE: CPT

## 2022-04-15 PROCEDURE — 36415 COLL VENOUS BLD VENIPUNCTURE: CPT

## 2022-04-16 LAB
ALBUMIN SERPL-MCNC: 4 G/DL (ref 3.5–5.2)
ALBUMIN/GLOBULIN RATIO: 1.6 (ref 1–2.5)
ALP BLD-CCNC: 253 U/L (ref 35–104)
ALT SERPL-CCNC: 15 U/L (ref 5–33)
ANION GAP SERPL CALCULATED.3IONS-SCNC: 11 MMOL/L (ref 9–17)
AST SERPL-CCNC: 25 U/L
BILIRUB SERPL-MCNC: 0.7 MG/DL (ref 0.3–1.2)
BUN BLDV-MCNC: 35 MG/DL (ref 8–23)
CALCIUM SERPL-MCNC: 9 MG/DL (ref 8.6–10.4)
CHLORIDE BLD-SCNC: 107 MMOL/L (ref 98–107)
CO2: 24 MMOL/L (ref 20–31)
CREAT SERPL-MCNC: 1.76 MG/DL (ref 0.5–0.9)
GFR AFRICAN AMERICAN: 35 ML/MIN
GFR NON-AFRICAN AMERICAN: 29 ML/MIN
GFR SERPL CREATININE-BSD FRML MDRD: ABNORMAL ML/MIN/{1.73_M2}
GLUCOSE BLD-MCNC: 77 MG/DL (ref 70–99)
POTASSIUM SERPL-SCNC: 4.3 MMOL/L (ref 3.7–5.3)
PRO-BNP: 457 PG/ML
SODIUM BLD-SCNC: 142 MMOL/L (ref 135–144)
TOTAL PROTEIN: 6.5 G/DL (ref 6.4–8.3)

## 2022-05-23 ENCOUNTER — HOSPITAL ENCOUNTER (OUTPATIENT)
Age: 67
Setting detail: SPECIMEN
Discharge: HOME | End: 2022-05-23
Payer: MEDICARE

## 2022-05-23 LAB
ABSOLUTE EOS #: 0.21 K/UL (ref 0–0.44)
ABSOLUTE IMMATURE GRANULOCYTE: 0.03 K/UL (ref 0–0.3)
ABSOLUTE LYMPH #: 1.9 K/UL (ref 1.1–3.7)
ABSOLUTE MONO #: 0.61 K/UL (ref 0.1–1.2)
ALBUMIN SERPL-MCNC: 4.4 G/DL (ref 3.5–5.2)
ALBUMIN/GLOBULIN RATIO: 1.3 (ref 1–2.5)
ALP BLD-CCNC: 285 U/L (ref 35–104)
ALT SERPL-CCNC: 23 U/L (ref 5–33)
ANION GAP SERPL CALCULATED.3IONS-SCNC: 20 MMOL/L (ref 9–17)
AST SERPL-CCNC: 35 U/L
BASOPHILS # BLD: 1 % (ref 0–2)
BASOPHILS ABSOLUTE: 0.04 K/UL (ref 0–0.2)
BILIRUB SERPL-MCNC: 0.67 MG/DL (ref 0.3–1.2)
BUN BLDV-MCNC: 66 MG/DL (ref 8–23)
CALCIUM SERPL-MCNC: 9.7 MG/DL (ref 8.6–10.4)
CHLORIDE BLD-SCNC: 96 MMOL/L (ref 98–107)
CO2: 23 MMOL/L (ref 20–31)
CREAT SERPL-MCNC: 1.94 MG/DL (ref 0.5–0.9)
EOSINOPHILS RELATIVE PERCENT: 3 % (ref 1–4)
GFR AFRICAN AMERICAN: 31 ML/MIN
GFR NON-AFRICAN AMERICAN: 26 ML/MIN
GFR SERPL CREATININE-BSD FRML MDRD: ABNORMAL ML/MIN/{1.73_M2}
GLUCOSE BLD-MCNC: 104 MG/DL (ref 70–99)
HCT VFR BLD CALC: 42.4 % (ref 36.3–47.1)
HEMOGLOBIN: 12.5 G/DL (ref 11.9–15.1)
IMMATURE GRANULOCYTES: 0 %
LYMPHOCYTES # BLD: 25 % (ref 24–43)
MCH RBC QN AUTO: 28.9 PG (ref 25.2–33.5)
MCHC RBC AUTO-ENTMCNC: 29.5 G/DL (ref 28.4–34.8)
MCV RBC AUTO: 98.1 FL (ref 82.6–102.9)
MONOCYTES # BLD: 8 % (ref 3–12)
NRBC AUTOMATED: 0 PER 100 WBC
PDW BLD-RTO: 13.1 % (ref 11.8–14.4)
PLATELET # BLD: 223 K/UL (ref 138–453)
PMV BLD AUTO: 10.5 FL (ref 8.1–13.5)
POTASSIUM SERPL-SCNC: 4.1 MMOL/L (ref 3.7–5.3)
RBC # BLD: 4.32 M/UL (ref 3.95–5.11)
SEG NEUTROPHILS: 63 % (ref 36–65)
SEGMENTED NEUTROPHILS ABSOLUTE COUNT: 4.68 K/UL (ref 1.5–8.1)
SODIUM BLD-SCNC: 139 MMOL/L (ref 135–144)
TOTAL PROTEIN: 7.7 G/DL (ref 6.4–8.3)
WBC # BLD: 7.5 K/UL (ref 3.5–11.3)

## 2022-05-23 PROCEDURE — 80053 COMPREHEN METABOLIC PANEL: CPT

## 2022-05-23 PROCEDURE — 36415 COLL VENOUS BLD VENIPUNCTURE: CPT

## 2022-05-23 PROCEDURE — 85025 COMPLETE CBC W/AUTO DIFF WBC: CPT

## 2023-03-07 ENCOUNTER — HOSPITAL ENCOUNTER (OUTPATIENT)
Age: 68
Setting detail: SPECIMEN
Discharge: HOME | End: 2023-03-07
Payer: MEDICARE

## 2023-03-07 LAB
ABSOLUTE EOS #: 0.19 K/UL (ref 0–0.44)
ABSOLUTE IMMATURE GRANULOCYTE: <0.03 K/UL (ref 0–0.3)
ABSOLUTE LYMPH #: 2.07 K/UL (ref 1.1–3.7)
ABSOLUTE MONO #: 0.43 K/UL (ref 0.1–1.2)
ALBUMIN SERPL-MCNC: 4.4 G/DL (ref 3.5–5.2)
ALBUMIN/GLOBULIN RATIO: 1.4 (ref 1–2.5)
ALP SERPL-CCNC: 255 U/L (ref 35–104)
ALT SERPL-CCNC: 14 U/L (ref 5–33)
ANION GAP SERPL CALCULATED.3IONS-SCNC: 14 MMOL/L (ref 9–17)
AST SERPL-CCNC: 24 U/L
BASOPHILS # BLD: 1 % (ref 0–2)
BASOPHILS ABSOLUTE: 0.04 K/UL (ref 0–0.2)
BILIRUB SERPL-MCNC: 1 MG/DL (ref 0.3–1.2)
BUN SERPL-MCNC: 39 MG/DL (ref 8–23)
CALCIUM SERPL-MCNC: 9.6 MG/DL (ref 8.6–10.4)
CHLORIDE SERPL-SCNC: 108 MMOL/L (ref 98–107)
CO2 SERPL-SCNC: 23 MMOL/L (ref 20–31)
CREAT SERPL-MCNC: 2.02 MG/DL (ref 0.5–0.9)
EOSINOPHILS RELATIVE PERCENT: 3 % (ref 1–4)
GFR SERPL CREATININE-BSD FRML MDRD: 26 ML/MIN/1.73M2
GLUCOSE SERPL-MCNC: 100 MG/DL (ref 70–99)
HCT VFR BLD AUTO: 40.6 % (ref 36.3–47.1)
HGB BLD-MCNC: 12.2 G/DL (ref 11.9–15.1)
IMMATURE GRANULOCYTES: 0 %
LYMPHOCYTES # BLD: 34 % (ref 24–43)
MAGNESIUM SERPL-MCNC: 2.2 MG/DL (ref 1.6–2.6)
MCH RBC QN AUTO: 28.8 PG (ref 25.2–33.5)
MCHC RBC AUTO-ENTMCNC: 30 G/DL (ref 28.4–34.8)
MCV RBC AUTO: 95.8 FL (ref 82.6–102.9)
MONOCYTES # BLD: 7 % (ref 3–12)
NRBC AUTOMATED: 0 PER 100 WBC
PDW BLD-RTO: 14.2 % (ref 11.8–14.4)
PLATELET # BLD AUTO: 235 K/UL (ref 138–453)
PMV BLD AUTO: 9.9 FL (ref 8.1–13.5)
POTASSIUM SERPL-SCNC: 4.4 MMOL/L (ref 3.7–5.3)
PROT SERPL-MCNC: 7.5 G/DL (ref 6.4–8.3)
RBC # BLD: 4.24 M/UL (ref 3.95–5.11)
SEG NEUTROPHILS: 55 % (ref 36–65)
SEGMENTED NEUTROPHILS ABSOLUTE COUNT: 3.29 K/UL (ref 1.5–8.1)
SODIUM SERPL-SCNC: 145 MMOL/L (ref 135–144)
WBC # BLD AUTO: 6 K/UL (ref 3.5–11.3)

## 2023-03-07 PROCEDURE — 83735 ASSAY OF MAGNESIUM: CPT

## 2023-03-07 PROCEDURE — 85025 COMPLETE CBC W/AUTO DIFF WBC: CPT

## 2023-03-07 PROCEDURE — 36415 COLL VENOUS BLD VENIPUNCTURE: CPT

## 2023-03-07 PROCEDURE — 80053 COMPREHEN METABOLIC PANEL: CPT

## 2023-03-15 ENCOUNTER — OFFICE VISIT (OUTPATIENT)
Dept: ORTHOPEDIC SURGERY | Age: 68
End: 2023-03-15

## 2023-03-15 DIAGNOSIS — M25.561 PAIN IN BOTH KNEES, UNSPECIFIED CHRONICITY: Primary | ICD-10-CM

## 2023-03-15 DIAGNOSIS — M25.562 PAIN IN BOTH KNEES, UNSPECIFIED CHRONICITY: Primary | ICD-10-CM

## 2023-03-15 NOTE — PROGRESS NOTES
Edith Boyce M.D.            [address] [facility] [address] [facility],[address], [address] [facility]           Dept Phone: [phone]           Dept Fax:  [phone]           [place]          Dept Phone: [phone]           Dept Fax:  [phone]      Chief Compliant:  Chief Complaint   Patient presents with    Pain     H/o bilateral TKA        History of Present Illness: This is a 69 y.o. female who presents to the clinic today for evaluation / follow up of status post bilateral total knees. Patient is a several years out from having these these done patient had preoperatively has severe varus arthrosis both knees would medial tibial erosion and significant subluxation. This required the placement of OSS/hinged knee prosthesis due to lack of ligamentous stability patient was initially seen in the office with ambulation per wheelchair she is now ambulating with a walker. She does state that she still has some moderate thigh pain but the knees itself are much better and she is in fact ambulating. .       Review of Systems   Constitutional: Negative for fever, chills, sweats. Eyes: Negative for changes in vision, or pain. HENT: Negative for ear ache, epistaxis, or sore throat. Respiratory/Cardio: Negative for Chest pain, palpitations, SOB, or cough. Gastrointestinal: Negative for abdominal pain, N/V/D. Genitourinary: Negative for dysuria, frequency, urgency, or hematuria. Neurological: Negative for headache, numbness, or weakness. Integumentary: Negative for rash, itching, laceration, or abrasion. Musculoskeletal: Positive for Pain (H/o bilateral TKA)       Physical Exam:  Constitutional: Patient is oriented to person, place, and time. Patient appears well-developed and well nourished.    HENT: Negative otherwise noted  Head: Normocephalic and Atraumatic  Nose: Normal  Eyes: Conjunctivae and EOM are normal  Neck: Normal range of motion Neck supple. Respiratory/Cardio: Effort normal. No respiratory distress. Musculoskeletal: Physical examination notes the patient has active extension against gravity of the right knee from about 100 degrees to full extension she has no lag she obviously has varus and valgus stability having had a hinged prosthesis. Patellar tracking is good    The left knee examination identical.    Neurological: Patient is alert and oriented to person, place, and time. Normal strength. No sensory deficit. Skin: Skin is warm and dry  Psychiatric: Behavior is normal. Thought content normal.  Nursing note and vitals reviewed. Labs and Imaging:     XR taken today:  XR KNEE BILATERAL STANDING    Result Date: 3/15/2023  X-rays taken today reviewed by me show standing AP of both knees and lateral of both knees. Patient is status post total knee hinged arthroplasties for severe varus arthrosis of both knees. All components appear to be in excellent line position on AP and lateral views there is been no evidence for interval change since most recent x-rays. Orders Placed This Encounter   Procedures    XR KNEE BILATERAL STANDING     Standing Status:   Future     Number of Occurrences:   1     Standing Expiration Date:   3/15/2024       Assessment and Plan:  Status post bilateral total knee arthroplasties with hinged prosthesis due to severe preoperative deformities now ambulating        This is a 69 y.o. female who presents to the clinic today for evaluation / follow up of status post bilateral total knees.      Past History:    Current Outpatient Medications:     albuterol (PROVENTIL) (2.5 MG/3ML) 0.083% nebulizer solution, Take 3 mLs by nebulization every 4 hours, Disp: 120 each, Rfl: 3    aspirin 325 MG EC tablet, Take 1 tablet by mouth 2 times daily, Disp: 30 tablet, Rfl: 3    sertraline (ZOLOFT) 50 MG tablet, Take 50 mg by mouth nightly, Disp: , Rfl:     Calcium Carbonate Antacid (TUMS PO), Take 1 tablet by mouth 3 times daily, Disp: , Rfl:     Cholecalciferol (VITAMIN D3) 79684 units CAPS, Take 1 capsule by mouth once a week, Disp: , Rfl:     furosemide (LASIX) 40 MG tablet, Take 40 mg by mouth daily, Disp: , Rfl:     spironolactone (ALDACTONE) 50 MG tablet, Take 50 mg by mouth daily, Disp: , Rfl:     omeprazole (PRILOSEC) 20 MG delayed release capsule, Take 20 mg by mouth daily, Disp: , Rfl:     ferrous sulfate 325 (65 FE) MG tablet, Take 325 mg by mouth daily (with breakfast), Disp: , Rfl:   Allergies   Allergen Reactions    Shellfish-Derived Products      Hives, nausea     Social History     Socioeconomic History    Marital status:       Spouse name: Not on file    Number of children: Not on file    Years of education: Not on file    Highest education level: Not on file   Occupational History    Not on file   Tobacco Use    Smoking status: Former     Packs/day: 0.50     Years: 36.00     Pack years: 18.00     Types: Cigarettes     Quit date: 2012     Years since quittin.0    Smokeless tobacco: Never   Vaping Use    Vaping Use: Never used   Substance and Sexual Activity    Alcohol use: No    Drug use: No    Sexual activity: Not on file   Other Topics Concern    Not on file   Social History Narrative    Not on file     Social Determinants of Health     Financial Resource Strain: Not on file   Food Insecurity: Not on file   Transportation Needs: Not on file   Physical Activity: Not on file   Stress: Not on file   Social Connections: Not on file   Intimate Partner Violence: Not on file   Housing Stability: Not on file     Past Medical History:   Diagnosis Date    Anemia     on iron    Arthritis     Colostomy in place [facility])     Diverticulitis     Hx, had a rupture one that ended up in a colostomy    DJD (degenerative joint disease) of knee     H/O hand fracture     right hand was casted, no surgery    Hypertension Morbid obesity with BMI of 50.0-59.9, adult ([address]     Vitamin D deficiency     Wears glasses     Wears partial dentures     upper and lower     Past Surgical History:   Procedure Laterality Date    CARPAL TUNNEL RELEASE Right 2014    CARPAL TUNNEL RELEASE Left 09/10/2014    CENTRAL LINE  3/12/2020          SECTION      X2    CHOLECYSTECTOMY      COLON SURGERY      ATTEMPTED TO REVERSE COLOSTOMY UNSUCCESSFUL    COLONOSCOPY      COLOSTOMY      for ruptured diverticuli    GASTRIC BYPASS SURGERY  2000    HAND SURGERY Left 2019    to shave down bone growth    JOINT REPLACEMENT Left 2019    TONSILLECTOMY      TOTAL KNEE ARTHROPLASTY Left 2019    KNEE TOTAL ARTHROPLASTY performed by Byron Thorpe MD at [address] Right 3/10/2020    KNEE COMPLEX TOTAL ARTHROPLASTY performed by Byron Thorpe MD at [address]     Family History   Problem Relation Age of Onset    Diabetes Mother     Heart Disease Father     Diabetes Father     Heart Failure Father    Plan  Patient overall is doing fairly well I did inform her that she is probably always could have some achiness in her muscles given the fact that she had not used these for years and a nonambulatory status and the fact that she still has a very elevated BMI. Overall the patient is pleased however and is ambulatory and which is a huge change in her life. We will see her back here on an annual basis call any problems prior to that time      Provider Attestation:  Jazmyn Ying, personally performed the services described in this documentation. All medical record entries made by the scribe were at my direction and in my presence. I have reviewed the chart and discharge instructions and agree that the records reflect my personal performance and is accurate and complete. Byron Thorpe MD. 03/15/23      Please note that this chart was generated using voice recognition Dragon dictation software.   Although every effort was made to ensure the accuracy of this automated transcription, some errors in transcription may have occurred.

## 2023-04-19 ENCOUNTER — HOSPITAL ENCOUNTER (OUTPATIENT)
Dept: ULTRASOUND IMAGING | Age: 68
Discharge: HOME | End: 2023-04-21
Payer: MEDICARE

## 2023-04-19 DIAGNOSIS — N17.9 ACUTE KIDNEY INJURY (NONTRAUMATIC) (HCC): ICD-10-CM

## 2023-04-19 PROCEDURE — 76770 US EXAM ABDO BACK WALL COMP: CPT

## 2023-05-04 ENCOUNTER — HOSPITAL ENCOUNTER (OUTPATIENT)
Age: 68
Setting detail: SPECIMEN
Discharge: HOME | End: 2023-05-04
Payer: MEDICARE

## 2023-05-04 LAB
25(OH)D3 SERPL-MCNC: 71.7 NG/ML
ABSOLUTE EOS #: 0.3 K/UL (ref 0–0.44)
ABSOLUTE IMMATURE GRANULOCYTE: <0.03 K/UL (ref 0–0.3)
ABSOLUTE LYMPH #: 1.81 K/UL (ref 1.1–3.7)
ABSOLUTE MONO #: 0.49 K/UL (ref 0.1–1.2)
ALBUMIN SERPL-MCNC: 3.8 G/DL (ref 3.5–5.2)
ANION GAP SERPL CALCULATED.3IONS-SCNC: 13 MMOL/L (ref 9–17)
BACTERIA: ABNORMAL
BASOPHILS # BLD: 0 % (ref 0–2)
BASOPHILS ABSOLUTE: <0.03 K/UL (ref 0–0.2)
BILIRUBIN URINE: NEGATIVE
BUN SERPL-MCNC: 44 MG/DL (ref 8–23)
CALCIUM SERPL-MCNC: 9 MG/DL (ref 8.6–10.4)
CASTS UA: ABNORMAL /LPF (ref 0–8)
CHLORIDE SERPL-SCNC: 106 MMOL/L (ref 98–107)
CO2 SERPL-SCNC: 23 MMOL/L (ref 20–31)
COLOR: YELLOW
CREAT SERPL-MCNC: 1.82 MG/DL (ref 0.5–0.9)
EOSINOPHILS RELATIVE PERCENT: 5 % (ref 1–4)
EPITHELIAL CELLS UA: ABNORMAL /HPF (ref 0–5)
GFR SERPL CREATININE-BSD FRML MDRD: 30 ML/MIN/1.73M2
GLUCOSE SERPL-MCNC: 106 MG/DL (ref 70–99)
GLUCOSE UR STRIP.AUTO-MCNC: NEGATIVE MG/DL
HCT VFR BLD AUTO: 35.6 % (ref 36.3–47.1)
HGB BLD-MCNC: 11.1 G/DL (ref 11.9–15.1)
IMMATURE GRANULOCYTES: 0 %
KETONES UR STRIP.AUTO-MCNC: NEGATIVE MG/DL
LEUKOCYTE ESTERASE UR QL STRIP.AUTO: ABNORMAL
LYMPHOCYTES # BLD: 28 % (ref 24–43)
MAGNESIUM SERPL-MCNC: 2 MG/DL (ref 1.6–2.6)
MCH RBC QN AUTO: 29.3 PG (ref 25.2–33.5)
MCHC RBC AUTO-ENTMCNC: 31.2 G/DL (ref 28.4–34.8)
MCV RBC AUTO: 93.9 FL (ref 82.6–102.9)
MONOCYTES # BLD: 7 % (ref 3–12)
NITRITE UR QL STRIP.AUTO: NEGATIVE
NRBC AUTOMATED: 0 PER 100 WBC
PDW BLD-RTO: 13.7 % (ref 11.8–14.4)
PHOSPHATE SERPL-MCNC: 3.9 MG/DL (ref 2.6–4.5)
PLATELET # BLD AUTO: 205 K/UL (ref 138–453)
PMV BLD AUTO: 10.2 FL (ref 8.1–13.5)
POTASSIUM SERPL-SCNC: 3.9 MMOL/L (ref 3.7–5.3)
PROT UR STRIP.AUTO-MCNC: 5 MG/DL (ref 5–8)
PROT UR STRIP.AUTO-MCNC: NEGATIVE MG/DL
PTH-INTACT SERPL-MCNC: 307.6 PG/ML (ref 14–72)
RBC # BLD: 3.79 M/UL (ref 3.95–5.11)
RBC CLUMPS #/AREA URNS AUTO: ABNORMAL /HPF (ref 0–4)
SEG NEUTROPHILS: 60 % (ref 36–65)
SEGMENTED NEUTROPHILS ABSOLUTE COUNT: 3.95 K/UL (ref 1.5–8.1)
SODIUM SERPL-SCNC: 142 MMOL/L (ref 135–144)
SPECIFIC GRAVITY UA: 1.01 (ref 1–1.03)
TURBIDITY: ABNORMAL
URINE HGB: ABNORMAL
UROBILINOGEN, URINE: NORMAL
WBC # BLD AUTO: 6.6 K/UL (ref 3.5–11.3)
WBC UA: ABNORMAL /HPF (ref 0–5)

## 2023-05-04 PROCEDURE — 85025 COMPLETE CBC W/AUTO DIFF WBC: CPT

## 2023-05-04 PROCEDURE — 36415 COLL VENOUS BLD VENIPUNCTURE: CPT

## 2023-05-04 PROCEDURE — 82570 ASSAY OF URINE CREATININE: CPT

## 2023-05-04 PROCEDURE — 83735 ASSAY OF MAGNESIUM: CPT

## 2023-05-04 PROCEDURE — 82040 ASSAY OF SERUM ALBUMIN: CPT

## 2023-05-04 PROCEDURE — 83970 ASSAY OF PARATHORMONE: CPT

## 2023-05-04 PROCEDURE — 84100 ASSAY OF PHOSPHORUS: CPT

## 2023-05-04 PROCEDURE — 82306 VITAMIN D 25 HYDROXY: CPT

## 2023-05-04 PROCEDURE — 82043 UR ALBUMIN QUANTITATIVE: CPT

## 2023-05-04 PROCEDURE — 81001 URINALYSIS AUTO W/SCOPE: CPT

## 2023-05-04 PROCEDURE — 80048 BASIC METABOLIC PNL TOTAL CA: CPT

## 2023-05-06 LAB
CREATININE URINE: 64.9 MG/DL (ref 28–217)
MICROALBUMIN/CREAT 24H UR: 15 MG/L
MICROALBUMIN/CREAT UR-RTO: 23 MCG/MG CREAT

## 2023-11-03 ENCOUNTER — HOSPITAL ENCOUNTER (EMERGENCY)
Age: 68
Discharge: HOME | End: 2023-11-03
Attending: EMERGENCY MEDICINE
Payer: MEDICARE

## 2023-11-03 ENCOUNTER — APPOINTMENT (OUTPATIENT)
Dept: GENERAL RADIOLOGY | Age: 68
End: 2023-11-03
Payer: MEDICARE

## 2023-11-03 VITALS
WEIGHT: 293 LBS | OXYGEN SATURATION: 99 % | TEMPERATURE: 98.2 F | HEIGHT: 67 IN | RESPIRATION RATE: 18 BRPM | BODY MASS INDEX: 45.99 KG/M2 | HEART RATE: 67 BPM | DIASTOLIC BLOOD PRESSURE: 68 MMHG | SYSTOLIC BLOOD PRESSURE: 94 MMHG

## 2023-11-03 DIAGNOSIS — M25.562 CHRONIC PAIN OF BOTH KNEES: ICD-10-CM

## 2023-11-03 DIAGNOSIS — G89.29 CHRONIC PAIN OF BOTH KNEES: ICD-10-CM

## 2023-11-03 DIAGNOSIS — M25.561 CHRONIC PAIN OF BOTH KNEES: ICD-10-CM

## 2023-11-03 DIAGNOSIS — M16.11 OSTEOARTHRITIS OF RIGHT HIP, UNSPECIFIED OSTEOARTHRITIS TYPE: ICD-10-CM

## 2023-11-03 DIAGNOSIS — M87.9 OSTEONECROSIS OF LEFT HIP (HCC): Primary | ICD-10-CM

## 2023-11-03 PROCEDURE — 73502 X-RAY EXAM HIP UNI 2-3 VIEWS: CPT

## 2023-11-03 PROCEDURE — 99283 EMERGENCY DEPT VISIT LOW MDM: CPT

## 2023-11-03 PROCEDURE — 73565 X-RAY EXAM OF KNEES: CPT

## 2023-11-03 PROCEDURE — 6370000000 HC RX 637 (ALT 250 FOR IP): Performed by: PHYSICIAN ASSISTANT

## 2023-11-03 RX ORDER — OXYCODONE HYDROCHLORIDE 5 MG/1
2.5 TABLET ORAL ONCE
Status: COMPLETED | OUTPATIENT
Start: 2023-11-03 | End: 2023-11-03

## 2023-11-03 RX ADMIN — OXYCODONE HYDROCHLORIDE 2.5 MG: 5 TABLET ORAL at 13:07

## 2023-11-03 ASSESSMENT — PATIENT HEALTH QUESTIONNAIRE - PHQ9
SUM OF ALL RESPONSES TO PHQ9 QUESTIONS 1 & 2: 0
2. FEELING DOWN, DEPRESSED OR HOPELESS: 0
SUM OF ALL RESPONSES TO PHQ QUESTIONS 1-9: 0
SUM OF ALL RESPONSES TO PHQ QUESTIONS 1-9: 0
1. LITTLE INTEREST OR PLEASURE IN DOING THINGS: 0
SUM OF ALL RESPONSES TO PHQ QUESTIONS 1-9: 0
SUM OF ALL RESPONSES TO PHQ QUESTIONS 1-9: 0

## 2023-11-03 ASSESSMENT — LIFESTYLE VARIABLES
HOW MANY STANDARD DRINKS CONTAINING ALCOHOL DO YOU HAVE ON A TYPICAL DAY: PATIENT DOES NOT DRINK
HOW OFTEN DO YOU HAVE A DRINK CONTAINING ALCOHOL: NEVER

## 2023-11-03 ASSESSMENT — PAIN DESCRIPTION - ORIENTATION: ORIENTATION: LEFT

## 2023-11-03 ASSESSMENT — PAIN - FUNCTIONAL ASSESSMENT: PAIN_FUNCTIONAL_ASSESSMENT: 0-10

## 2023-11-03 ASSESSMENT — PAIN SCALES - GENERAL: PAINLEVEL_OUTOF10: 8

## 2023-11-03 ASSESSMENT — PAIN DESCRIPTION - LOCATION: LOCATION: KNEE

## 2023-11-03 NOTE — ED PROVIDER NOTES
eMERGENCY dEPARTMENT eNCOUnter   Independent Attestation     Pt Name: Jordi Hopper  MRN: [id number]  [address] [facility] 1955  Date of evaluation: 11/3/23     Jordi Hopper is a 69 y.o. female with CC: Knee Pain and Leg Pain      Based on the medical record the care appears appropriate. I was personally available for consultation in the Emergency Department.     DO Eber  Attending Emergency Physician                  Michael Orozco, [address] [place] [address]  11/03/23 1942

## 2023-11-03 NOTE — DISCHARGE INSTRUCTIONS
Limit the amount of time you are up and walking around. Use a walker at all times to take pressure off of the left hip. Continue your tylenol arthritis as currently taking (2 tabs every 8 hours). I am also prescribing a very small amount of oxycodone for you to use for severe breakthrough pain if needed. Follow up with Dr. Oliveros Go as soon as possible.

## 2023-11-03 NOTE — ED TRIAGE NOTES
Mode of arrival (squad #, walk in, police, etc) : walk in        Chief complaint(s): Knee pain, Leg pain        Arrival Note (brief scenario, treatment PTA, etc). : In 2019  patient had both knees replaced and hasn't had the pain go completely away. Pt reports L knee and R lower leg pain with the Left being the worst. Pt is A&Ox4.        C= \"Have you ever felt that you should Cut down on your drinking? \"  No  A= \"Have people Annoyed you by criticizing your drinking? \"  No  G= \"Have you ever felt bad or Guilty about your drinking? \"  No  E= \"Have you ever had a drink as an Eye-opener first thing in the morning to steady your nerves or to help a hangover? \"  No      Deferred []      Reason for deferring: N/A    *If yes to two or more: probable alcohol abuse. *

## 2023-11-03 NOTE — ED PROVIDER NOTES
and palpation of the right hip. High level of discomfort noted PROM and palpation of left hip. Particularly with internal rotation of the hip. She has intact light touch sensation throughout both lower extremities. Pedal pulses 2+ bilat. Mild 1+ pitting edema BLE (states it is chronic). MEDICAL DECISION MAKING AND ED COURSE:   1)  Number and Complexity of Problems  Problem List This Visit:  bilateral knee and hip pain, particularly left hip    Differential Diagnosis: OA, stress fracture, bursitis, hardware malfunction, AVN    Diagnoses Considered but Do Not Suspect:  septic arthritis    2)  Treatment and Disposition  Patient appears very uncomfortable. She is already on tylenol, can't take NSAIDs, does not do well with tramadol. Will try low dose of oxycodone here in ED and see how she tolerates it. Also obtained bilat weightbearing x-rays of hips and knees. 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 ED Course as of 11/04/23 1108   Fri Nov 03, 2023   1428 Patient has significant pain relief after 2.5mg oxycodone. Does not feel drowsy or loopy. [ET]      ED Course User Index  [ET] Kathleen Staff, PA     Disposition discussion with patient/family, Shared Decision Making: At this point, patient needs some pain control until she can have definitive management of her left hip issues with ortho. A low dose of oxy was effective here in the ED and she seemed to tolerate it well. Discussed that I will dispense a few pills for severe pain at home to tie her over until her appt with Dr. Luis James. We discussed side effects of narcotics and a narcan kit was dispensed. Recommended calling to try and get appt moved up. She should continue her tylenol arthritis as currently prescribed.  Minimize weightbearing on the left hip to only as absolutely needed and always with colostomy    DJD (degenerative joint disease) of knee     H/O hand fracture     right hand was casted, no surgery    Hypertension     Morbid obesity with BMI of 50.0-59.9, adult ([facility])     Vitamin D deficiency     Wears glasses     Wears partial dentures     upper and lower     Patient Active Problem List   Diagnosis Code    Asthmatic bronchitis with acute exacerbation J45.901    Primary osteoarthritis of both knees M17.0    Primary cough headache G44.83    SBO (small bowel obstruction) ([address]) K56.609    Ventral hernia K43.9    Essential hypertension I10    Intermittent asthma J45.20    Anasarca R60.1    Acute kidney injury ([address]) N17.9    Urinary tract infection without hematuria N39.0    Primary osteoarthritis of left knee M17.12    Hyperkalemia E87.5    Acute gastritis without hemorrhage K29.00    Open wound of left knee S81.002A    Delayed surgical wound healing T81.89XA    Status post total left knee replacement Z96.652    Primary osteoarthritis of right knee M17.11    Postprocedural hypotension I95.81    Acute kidney injury (MIGUE) with acute tubular necrosis (ATN) ([facility]) N17.0    Morbid obesity with BMI of 50.0-59.9, adult ([facility]) E66.01, Z68.43    Arthritis of both knees M17.0    Chronic obstructive pulmonary disease ([facility]) J44.9    Colostomy present ([address]) Z93.3    Disability of walking R26.2    Lymphedema I89.0    Obstructive sleep apnea syndrome G47.33    Severe depression ([facility]) F32.2    Stage 2 chronic kidney disease N18.2     SURGICAL HISTORY       Past Surgical History:   Procedure Laterality Date    CARPAL TUNNEL RELEASE Right 2014    CARPAL TUNNEL RELEASE Left 09/10/2014    CENTRAL LINE  3/12/2020          SECTION      X2    CHOLECYSTECTOMY      COLON SURGERY      ATTEMPTED TO REVERSE COLOSTOMY UNSUCCESSFUL    COLONOSCOPY      COLOSTOMY      for ruptured diverticuli    GASTRIC BYPASS SURGERY  2000    HAND SURGERY Left 2019    to shave down bone growth    JOINT REPLACEMENT Left 2019

## 2023-11-15 ENCOUNTER — OFFICE VISIT (OUTPATIENT)
Dept: ORTHOPEDIC SURGERY | Age: 68
End: 2023-11-15
Payer: MEDICARE

## 2023-11-15 DIAGNOSIS — M17.12 PRIMARY OSTEOARTHRITIS OF LEFT KNEE: ICD-10-CM

## 2023-11-15 DIAGNOSIS — M25.552 LEFT HIP PAIN: Primary | ICD-10-CM

## 2023-11-15 PROCEDURE — 4004F PT TOBACCO SCREEN RCVD TLK: CPT | Performed by: ORTHOPAEDIC SURGERY

## 2023-11-15 PROCEDURE — 3017F COLORECTAL CA SCREEN DOC REV: CPT | Performed by: ORTHOPAEDIC SURGERY

## 2023-11-15 PROCEDURE — 1123F ACP DISCUSS/DSCN MKR DOCD: CPT | Performed by: ORTHOPAEDIC SURGERY

## 2023-11-15 PROCEDURE — 1090F PRES/ABSN URINE INCON ASSESS: CPT | Performed by: ORTHOPAEDIC SURGERY

## 2023-11-15 PROCEDURE — 99213 OFFICE O/P EST LOW 20 MIN: CPT | Performed by: ORTHOPAEDIC SURGERY

## 2023-11-15 PROCEDURE — G8400 PT W/DXA NO RESULTS DOC: HCPCS | Performed by: ORTHOPAEDIC SURGERY

## 2023-11-15 PROCEDURE — G8428 CUR MEDS NOT DOCUMENT: HCPCS | Performed by: ORTHOPAEDIC SURGERY

## 2023-11-15 PROCEDURE — G8484 FLU IMMUNIZE NO ADMIN: HCPCS | Performed by: ORTHOPAEDIC SURGERY

## 2023-11-15 PROCEDURE — G8417 CALC BMI ABV UP PARAM F/U: HCPCS | Performed by: ORTHOPAEDIC SURGERY

## 2023-11-15 NOTE — PROGRESS NOTES
Willem Guy M.D.            [address] [facility], [address] [facility], [address] [place] [address] [address] [place]           Dept Phone: [phone]           Dept Fax:  [address]          Dept Phone: [phone]           Dept Fax:  [phone]      Chief Compliant:  Chief Complaint   Patient presents with    Pain     LT KNEE        History of Present Illness: This is a 69 y.o. female who presents to the clinic today for evaluation / follow up of left hip pain. Patient has a history of bilateral total knees which are hinged prosthesis for severe degenerative joint disease of the knees she is doing okay with these she was recently seen at [facility] for severe left hip pain. She denies any injury or trauma or falls. Review of Systems   Constitutional: Negative for fever, chills, sweats. Eyes: Negative for changes in vision, or pain. HENT: Negative for ear ache, epistaxis, or sore throat. Respiratory/Cardio: Negative for Chest pain, palpitations, SOB, or cough. Gastrointestinal: Negative for abdominal pain, N/V/D. Genitourinary: Negative for dysuria, frequency, urgency, or hematuria. Neurological: Negative for headache, numbness, or weakness. Integumentary: Negative for rash, itching, laceration, or abrasion. Musculoskeletal: Positive for Pain (LT KNEE)       Physical Exam:  Constitutional: Patient is oriented to person, place, and time. Patient appears well-developed and well nourished. HENT: Negative otherwise noted  Head: Normocephalic and Atraumatic  Nose: Normal  Eyes: Conjunctivae and EOM are normal  Neck: Normal range of motion Neck supple. Respiratory/Cardio: Effort normal. No respiratory distress. Musculoskeletal: Examination notes severe pain with any kind of motion of her left hip.   Neurological: Patient is alert and

## 2023-11-30 ENCOUNTER — HOSPITAL ENCOUNTER (OUTPATIENT)
Dept: CT IMAGING | Age: 68
Discharge: HOME | End: 2023-12-02
Attending: ORTHOPAEDIC SURGERY
Payer: MEDICARE

## 2023-11-30 DIAGNOSIS — M25.552 LEFT HIP PAIN: ICD-10-CM

## 2023-11-30 PROCEDURE — 73700 CT LOWER EXTREMITY W/O DYE: CPT

## 2023-12-05 ENCOUNTER — TELEPHONE (OUTPATIENT)
Dept: ORTHOPEDIC SURGERY | Age: 68
End: 2023-12-05

## 2023-12-05 NOTE — TELEPHONE ENCOUNTER
----- Message from Brielle Rubio MD sent at 11/30/2023 12:27 PM EST -----  Patient has obviously severe osteoarthritis of her hip but she also has pretty significant acetabular insufficiency.   I think she be best served by a total joint revision specialist.  I would recommend Dr. James Shown at [facility]  ----- Message -----  From: Kadie Santana Incoming Radiant Results From [employer]  Sent: 11/30/2023  12:23 PM EST  To: Brielle Rubio MD

## 2023-12-06 ENCOUNTER — TELEPHONE (OUTPATIENT)
Dept: ORTHOPEDIC SURGERY | Age: 68
End: 2023-12-06

## 2023-12-06 DIAGNOSIS — M25.552 LEFT HIP PAIN: Primary | ICD-10-CM

## 2023-12-06 NOTE — TELEPHONE ENCOUNTER
I contacted the patient regarding the results of her left hip CT. Referral made to Dr. Art Gardner and faxed.

## 2023-12-06 NOTE — TELEPHONE ENCOUNTER
----- Message from Shine Morrison MD sent at 11/30/2023 12:27 PM EST -----  Patient has obviously severe osteoarthritis of her hip but she also has pretty significant acetabular insufficiency.   I think she be best served by a total joint revision specialist.  I would recommend Dr. Joe King at [facility]  ----- Message -----  From: Kadie Santana Incoming Radiant Results From [employer]  Sent: 11/30/2023  12:23 PM EST  To: Shine Morrison MD

## 2024-01-25 ENCOUNTER — HOSPITAL ENCOUNTER (OUTPATIENT)
Age: 69
Setting detail: SPECIMEN
Discharge: HOME | End: 2024-01-25

## 2024-01-25 LAB
ALBUMIN SERPL-MCNC: 3.4 G/DL (ref 3.5–5.2)
ALP SERPL-CCNC: 220 U/L (ref 35–104)
ALT SERPL-CCNC: 14 U/L (ref 5–33)
ANION GAP SERPL CALCULATED.3IONS-SCNC: 12 MMOL/L (ref 9–17)
AST SERPL-CCNC: 23 U/L
BILIRUB SERPL-MCNC: 0.5 MG/DL (ref 0.3–1.2)
BUN SERPL-MCNC: 40 MG/DL (ref 8–23)
BUN/CREAT SERPL: 24 (ref 9–20)
CALCIUM SERPL-MCNC: 9 MG/DL (ref 8.6–10.4)
CHLORIDE SERPL-SCNC: 106 MMOL/L (ref 98–107)
CO2 SERPL-SCNC: 23 MMOL/L (ref 20–31)
CREAT SERPL-MCNC: 1.7 MG/DL (ref 0.5–0.9)
ERYTHROCYTE [DISTWIDTH] IN BLOOD BY AUTOMATED COUNT: 13.5 % (ref 11.8–14.4)
GFR SERPL CREATININE-BSD FRML MDRD: 32 ML/MIN/1.73M2
GLUCOSE SERPL-MCNC: 80 MG/DL (ref 70–99)
HCT VFR BLD AUTO: 34.4 % (ref 36.3–47.1)
HGB BLD-MCNC: 10.2 G/DL (ref 11.9–15.1)
MAGNESIUM SERPL-MCNC: 2.3 MG/DL (ref 1.6–2.6)
MCH RBC QN AUTO: 28.7 PG (ref 25.2–33.5)
MCHC RBC AUTO-ENTMCNC: 29.7 G/DL (ref 28.4–34.8)
MCV RBC AUTO: 96.6 FL (ref 82.6–102.9)
NRBC BLD-RTO: 0 PER 100 WBC
PLATELET # BLD AUTO: 206 K/UL (ref 138–453)
PMV BLD AUTO: 10 FL (ref 8.1–13.5)
POTASSIUM SERPL-SCNC: 4.6 MMOL/L (ref 3.7–5.3)
PROT SERPL-MCNC: 5.8 G/DL (ref 6.4–8.3)
RBC # BLD AUTO: 3.56 M/UL (ref 3.95–5.11)
SODIUM SERPL-SCNC: 141 MMOL/L (ref 135–144)
WBC OTHER # BLD: 4.8 K/UL (ref 3.5–11.3)

## 2024-01-25 PROCEDURE — 36415 COLL VENOUS BLD VENIPUNCTURE: CPT

## 2024-01-25 PROCEDURE — 85027 COMPLETE CBC AUTOMATED: CPT

## 2024-01-25 PROCEDURE — 80053 COMPREHEN METABOLIC PANEL: CPT

## 2024-01-25 PROCEDURE — 83735 ASSAY OF MAGNESIUM: CPT

## 2024-01-25 PROCEDURE — P9603 ONE-WAY ALLOW PRORATED MILES: HCPCS

## 2024-01-26 ENCOUNTER — HOSPITAL ENCOUNTER (OUTPATIENT)
Age: 69
Setting detail: SPECIMEN
Discharge: HOME | End: 2024-01-26

## 2024-01-26 LAB
ANION GAP SERPL CALCULATED.3IONS-SCNC: 10 MMOL/L (ref 9–17)
BUN SERPL-MCNC: 43 MG/DL (ref 8–23)
BUN/CREAT SERPL: 24 (ref 9–20)
CALCIUM SERPL-MCNC: 8.7 MG/DL (ref 8.6–10.4)
CHLORIDE SERPL-SCNC: 107 MMOL/L (ref 98–107)
CO2 SERPL-SCNC: 25 MMOL/L (ref 20–31)
CREAT SERPL-MCNC: 1.8 MG/DL (ref 0.5–0.9)
ERYTHROCYTE [DISTWIDTH] IN BLOOD BY AUTOMATED COUNT: 13.5 % (ref 11.8–14.4)
GFR SERPL CREATININE-BSD FRML MDRD: 30 ML/MIN/1.73M2
GLUCOSE SERPL-MCNC: 80 MG/DL (ref 70–99)
HCT VFR BLD AUTO: 32.1 % (ref 36.3–47.1)
HGB BLD-MCNC: 9.9 G/DL (ref 11.9–15.1)
MCH RBC QN AUTO: 29.8 PG (ref 25.2–33.5)
MCHC RBC AUTO-ENTMCNC: 30.8 G/DL (ref 28.4–34.8)
MCV RBC AUTO: 96.7 FL (ref 82.6–102.9)
NRBC BLD-RTO: 0 PER 100 WBC
PLATELET # BLD AUTO: 211 K/UL (ref 138–453)
PMV BLD AUTO: 10.6 FL (ref 8.1–13.5)
POTASSIUM SERPL-SCNC: 4.7 MMOL/L (ref 3.7–5.3)
RBC # BLD AUTO: 3.32 M/UL (ref 3.95–5.11)
SODIUM SERPL-SCNC: 142 MMOL/L (ref 135–144)
WBC OTHER # BLD: 4.7 K/UL (ref 3.5–11.3)

## 2024-01-26 PROCEDURE — 85027 COMPLETE CBC AUTOMATED: CPT

## 2024-01-26 PROCEDURE — 36415 COLL VENOUS BLD VENIPUNCTURE: CPT

## 2024-01-26 PROCEDURE — 80048 BASIC METABOLIC PNL TOTAL CA: CPT

## 2024-01-26 PROCEDURE — P9603 ONE-WAY ALLOW PRORATED MILES: HCPCS

## 2024-01-30 ENCOUNTER — HOSPITAL ENCOUNTER (OUTPATIENT)
Age: 69
Setting detail: SPECIMEN
Discharge: HOME | End: 2024-01-30

## 2024-01-30 LAB
ANION GAP SERPL CALCULATED.3IONS-SCNC: 13 MMOL/L (ref 9–17)
BUN SERPL-MCNC: 52 MG/DL (ref 8–23)
BUN/CREAT SERPL: 20 (ref 9–20)
CALCIUM SERPL-MCNC: 8.4 MG/DL (ref 8.6–10.4)
CHLORIDE SERPL-SCNC: 106 MMOL/L (ref 98–107)
CO2 SERPL-SCNC: 23 MMOL/L (ref 20–31)
CREAT SERPL-MCNC: 2.6 MG/DL (ref 0.5–0.9)
ERYTHROCYTE [DISTWIDTH] IN BLOOD BY AUTOMATED COUNT: 13.7 % (ref 11.8–14.4)
GFR SERPL CREATININE-BSD FRML MDRD: 19 ML/MIN/1.73M2
GLUCOSE SERPL-MCNC: 144 MG/DL (ref 70–99)
HCT VFR BLD AUTO: 33.3 % (ref 36.3–47.1)
HGB BLD-MCNC: 10 G/DL (ref 11.9–15.1)
MCH RBC QN AUTO: 29.3 PG (ref 25.2–33.5)
MCHC RBC AUTO-ENTMCNC: 30 G/DL (ref 28.4–34.8)
MCV RBC AUTO: 97.7 FL (ref 82.6–102.9)
NRBC BLD-RTO: 0 PER 100 WBC
PLATELET # BLD AUTO: 203 K/UL (ref 138–453)
PMV BLD AUTO: 11 FL (ref 8.1–13.5)
POTASSIUM SERPL-SCNC: 4.6 MMOL/L (ref 3.7–5.3)
RBC # BLD AUTO: 3.41 M/UL (ref 3.95–5.11)
SODIUM SERPL-SCNC: 142 MMOL/L (ref 135–144)
WBC OTHER # BLD: 5.7 K/UL (ref 3.5–11.3)

## 2024-01-30 PROCEDURE — 85027 COMPLETE CBC AUTOMATED: CPT

## 2024-01-30 PROCEDURE — P9603 ONE-WAY ALLOW PRORATED MILES: HCPCS

## 2024-01-30 PROCEDURE — 36415 COLL VENOUS BLD VENIPUNCTURE: CPT

## 2024-01-30 PROCEDURE — 80048 BASIC METABOLIC PNL TOTAL CA: CPT

## 2024-01-31 ENCOUNTER — HOSPITAL ENCOUNTER (OUTPATIENT)
Age: 69
Setting detail: SPECIMEN
Discharge: HOME | End: 2024-01-31

## 2024-01-31 LAB
ANION GAP SERPL CALCULATED.3IONS-SCNC: 12 MMOL/L (ref 9–17)
BUN SERPL-MCNC: 51 MG/DL (ref 8–23)
BUN/CREAT SERPL: 22 (ref 9–20)
CALCIUM SERPL-MCNC: 8.9 MG/DL (ref 8.6–10.4)
CHLORIDE SERPL-SCNC: 105 MMOL/L (ref 98–107)
CO2 SERPL-SCNC: 25 MMOL/L (ref 20–31)
CREAT SERPL-MCNC: 2.3 MG/DL (ref 0.5–0.9)
GFR SERPL CREATININE-BSD FRML MDRD: 22 ML/MIN/1.73M2
GLUCOSE SERPL-MCNC: 117 MG/DL (ref 70–99)
POTASSIUM SERPL-SCNC: 4.6 MMOL/L (ref 3.7–5.3)
SODIUM SERPL-SCNC: 142 MMOL/L (ref 135–144)

## 2024-01-31 PROCEDURE — 36415 COLL VENOUS BLD VENIPUNCTURE: CPT

## 2024-01-31 PROCEDURE — P9603 ONE-WAY ALLOW PRORATED MILES: HCPCS

## 2024-01-31 PROCEDURE — 80048 BASIC METABOLIC PNL TOTAL CA: CPT

## 2024-02-02 ENCOUNTER — HOSPITAL ENCOUNTER (OUTPATIENT)
Age: 69
Setting detail: SPECIMEN
Discharge: HOME | End: 2024-02-02

## 2024-02-02 LAB
ANION GAP SERPL CALCULATED.3IONS-SCNC: 11 MMOL/L (ref 9–17)
BUN SERPL-MCNC: 53 MG/DL (ref 8–23)
BUN/CREAT SERPL: 21 (ref 9–20)
CALCIUM SERPL-MCNC: 8.6 MG/DL (ref 8.6–10.4)
CHLORIDE SERPL-SCNC: 107 MMOL/L (ref 98–107)
CO2 SERPL-SCNC: 25 MMOL/L (ref 20–31)
CREAT SERPL-MCNC: 2.5 MG/DL (ref 0.5–0.9)
ERYTHROCYTE [DISTWIDTH] IN BLOOD BY AUTOMATED COUNT: 14 % (ref 11.8–14.4)
GFR SERPL CREATININE-BSD FRML MDRD: 20 ML/MIN/1.73M2
GLUCOSE SERPL-MCNC: 82 MG/DL (ref 70–99)
HCT VFR BLD AUTO: 32.9 % (ref 36.3–47.1)
HGB BLD-MCNC: 10 G/DL (ref 11.9–15.1)
MCH RBC QN AUTO: 29.8 PG (ref 25.2–33.5)
MCHC RBC AUTO-ENTMCNC: 30.4 G/DL (ref 28.4–34.8)
MCV RBC AUTO: 97.9 FL (ref 82.6–102.9)
NRBC BLD-RTO: 0 PER 100 WBC
PLATELET # BLD AUTO: 228 K/UL (ref 138–453)
PMV BLD AUTO: 10.1 FL (ref 8.1–13.5)
POTASSIUM SERPL-SCNC: 4.6 MMOL/L (ref 3.7–5.3)
RBC # BLD AUTO: 3.36 M/UL (ref 3.95–5.11)
SODIUM SERPL-SCNC: 143 MMOL/L (ref 135–144)
WBC OTHER # BLD: 5.3 K/UL (ref 3.5–11.3)

## 2024-02-02 PROCEDURE — 36415 COLL VENOUS BLD VENIPUNCTURE: CPT

## 2024-02-02 PROCEDURE — 85027 COMPLETE CBC AUTOMATED: CPT

## 2024-02-02 PROCEDURE — 80048 BASIC METABOLIC PNL TOTAL CA: CPT

## 2024-02-02 PROCEDURE — P9603 ONE-WAY ALLOW PRORATED MILES: HCPCS

## 2024-03-20 ENCOUNTER — OFFICE VISIT (OUTPATIENT)
Dept: ORTHOPEDIC SURGERY | Age: 69
End: 2024-03-20
Payer: MEDICARE

## 2024-03-20 DIAGNOSIS — M25.551 BILATERAL HIP PAIN: ICD-10-CM

## 2024-03-20 DIAGNOSIS — M25.561 PAIN IN BOTH KNEES, UNSPECIFIED CHRONICITY: Primary | ICD-10-CM

## 2024-03-20 DIAGNOSIS — M25.562 PAIN IN BOTH KNEES, UNSPECIFIED CHRONICITY: Primary | ICD-10-CM

## 2024-03-20 DIAGNOSIS — Z96.653 S/P TOTAL KNEE ARTHROPLASTY, BILATERAL: ICD-10-CM

## 2024-03-20 DIAGNOSIS — M25.552 BILATERAL HIP PAIN: ICD-10-CM

## 2024-03-20 PROCEDURE — G8417 CALC BMI ABV UP PARAM F/U: HCPCS | Performed by: ORTHOPAEDIC SURGERY

## 2024-03-20 PROCEDURE — G8400 PT W/DXA NO RESULTS DOC: HCPCS | Performed by: ORTHOPAEDIC SURGERY

## 2024-03-20 PROCEDURE — G8427 DOCREV CUR MEDS BY ELIG CLIN: HCPCS | Performed by: ORTHOPAEDIC SURGERY

## 2024-03-20 PROCEDURE — 1123F ACP DISCUSS/DSCN MKR DOCD: CPT | Performed by: ORTHOPAEDIC SURGERY

## 2024-03-20 PROCEDURE — 3017F COLORECTAL CA SCREEN DOC REV: CPT | Performed by: ORTHOPAEDIC SURGERY

## 2024-03-20 PROCEDURE — G8484 FLU IMMUNIZE NO ADMIN: HCPCS | Performed by: ORTHOPAEDIC SURGERY

## 2024-03-20 PROCEDURE — 99213 OFFICE O/P EST LOW 20 MIN: CPT | Performed by: ORTHOPAEDIC SURGERY

## 2024-03-20 PROCEDURE — 1090F PRES/ABSN URINE INCON ASSESS: CPT | Performed by: ORTHOPAEDIC SURGERY

## 2024-03-20 PROCEDURE — 4004F PT TOBACCO SCREEN RCVD TLK: CPT | Performed by: ORTHOPAEDIC SURGERY

## 2024-03-20 NOTE — PROGRESS NOTES
Problem Relation Age of Onset    Diabetes Mother     Heart Disease Father     Diabetes Father     Heart Failure Father    Plan  Patient is aware that she is can require hip arthroplasty at some point in future but BMI is 62 puts her at extremely high risk for having problems with hip procedure.  She is in the weight management program I think would behoove her to continue this for several more months prior to considering even doing anything with her hips.  I think that she would benefit at least functionally to have IR injections to help out with her disc discomfort.  Will see her back here in 4 months      Provider Attestation:  I, Kamari White, personally performed the services described in this documentation. All medical record entries made by the scribe were at my direction and in my presence. I have reviewed the chart and discharge instructions and agree that the records reflect my personal performance and is accurate and complete. Kamari White MD. 03/20/24      Please note that this chart was generated using voice recognition Dragon dictation software.  Although every effort was made to ensure the accuracy of this automated transcription, some errors in transcription may have occurred.

## 2024-03-21 ENCOUNTER — TELEPHONE (OUTPATIENT)
Dept: INTERVENTIONAL RADIOLOGY/VASCULAR | Age: 69
End: 2024-03-21

## 2024-04-02 ENCOUNTER — TELEPHONE (OUTPATIENT)
Dept: INTERVENTIONAL RADIOLOGY/VASCULAR | Age: 69
End: 2024-04-02

## 2024-10-16 ENCOUNTER — TELEPHONE (OUTPATIENT)
Dept: ORTHOPEDIC SURGERY | Age: 69
End: 2024-10-16

## 2024-10-16 NOTE — TELEPHONE ENCOUNTER
Patients daughter Opal called wanting to know what is needed for patient to get hip replacement. Goal of BMI and weight before surgery?   Wanting to know if this can be discussed prior to next appointment so they have her goal for surgery in mind  Please advise  Thank you

## 2024-11-20 ENCOUNTER — OFFICE VISIT (OUTPATIENT)
Dept: ORTHOPEDIC SURGERY | Age: 69
End: 2024-11-20
Payer: MEDICARE

## 2024-11-20 VITALS — WEIGHT: 293 LBS | RESPIRATION RATE: 14 BRPM | BODY MASS INDEX: 45.99 KG/M2 | HEIGHT: 67 IN

## 2024-11-20 DIAGNOSIS — M16.0 OSTEOARTHRITIS OF BOTH HIPS, UNSPECIFIED OSTEOARTHRITIS TYPE: Primary | ICD-10-CM

## 2024-11-20 PROCEDURE — G8400 PT W/DXA NO RESULTS DOC: HCPCS | Performed by: PHYSICIAN ASSISTANT

## 2024-11-20 PROCEDURE — 99213 OFFICE O/P EST LOW 20 MIN: CPT | Performed by: PHYSICIAN ASSISTANT

## 2024-11-20 PROCEDURE — G8417 CALC BMI ABV UP PARAM F/U: HCPCS | Performed by: PHYSICIAN ASSISTANT

## 2024-11-20 PROCEDURE — 1036F TOBACCO NON-USER: CPT | Performed by: PHYSICIAN ASSISTANT

## 2024-11-20 PROCEDURE — 1123F ACP DISCUSS/DSCN MKR DOCD: CPT | Performed by: PHYSICIAN ASSISTANT

## 2024-11-20 PROCEDURE — 1125F AMNT PAIN NOTED PAIN PRSNT: CPT | Performed by: PHYSICIAN ASSISTANT

## 2024-11-20 PROCEDURE — 1090F PRES/ABSN URINE INCON ASSESS: CPT | Performed by: PHYSICIAN ASSISTANT

## 2024-11-20 PROCEDURE — 3017F COLORECTAL CA SCREEN DOC REV: CPT | Performed by: PHYSICIAN ASSISTANT

## 2024-11-20 PROCEDURE — G8484 FLU IMMUNIZE NO ADMIN: HCPCS | Performed by: PHYSICIAN ASSISTANT

## 2024-11-20 PROCEDURE — G8428 CUR MEDS NOT DOCUMENT: HCPCS | Performed by: PHYSICIAN ASSISTANT

## 2024-11-20 NOTE — PROGRESS NOTES
every effort was made to ensure the accuracy of this automated transcription, some errors in transcription may have occurred.

## 2024-11-21 ENCOUNTER — TELEPHONE (OUTPATIENT)
Dept: INTERVENTIONAL RADIOLOGY/VASCULAR | Age: 69
End: 2024-11-21

## 2024-12-13 ENCOUNTER — HOSPITAL ENCOUNTER (OUTPATIENT)
Dept: INTERVENTIONAL RADIOLOGY/VASCULAR | Age: 69
Discharge: HOME | End: 2024-12-15
Payer: MEDICARE

## 2024-12-13 DIAGNOSIS — M16.0 OSTEOARTHRITIS OF BOTH HIPS, UNSPECIFIED OSTEOARTHRITIS TYPE: ICD-10-CM

## 2024-12-13 PROCEDURE — 20610 DRAIN/INJ JOINT/BURSA W/O US: CPT

## 2024-12-13 PROCEDURE — 6360000004 HC RX CONTRAST MEDICATION: Performed by: RADIOLOGY

## 2024-12-13 PROCEDURE — 2709999900 IR ARTHR/ASP/INJ MAJOR JT/BURSA RIGHT WO US

## 2024-12-13 PROCEDURE — 6360000002 HC RX W HCPCS: Performed by: PHYSICIAN ASSISTANT

## 2024-12-13 PROCEDURE — 77002 NEEDLE LOCALIZATION BY XRAY: CPT

## 2024-12-13 RX ORDER — METHYLPREDNISOLONE ACETATE 80 MG/ML
80 INJECTION, SUSPENSION INTRA-ARTICULAR; INTRALESIONAL; INTRAMUSCULAR; SOFT TISSUE ONCE
Status: COMPLETED | OUTPATIENT
Start: 2024-12-13 | End: 2024-12-13

## 2024-12-13 RX ORDER — IOPAMIDOL 612 MG/ML
INJECTION, SOLUTION INTRAVASCULAR PRN
Status: COMPLETED | OUTPATIENT
Start: 2024-12-13 | End: 2024-12-13

## 2024-12-13 RX ORDER — BUPIVACAINE HYDROCHLORIDE 5 MG/ML
4 INJECTION, SOLUTION EPIDURAL; INTRACAUDAL ONCE
Status: COMPLETED | OUTPATIENT
Start: 2024-12-13 | End: 2024-12-13

## 2024-12-13 RX ORDER — LIDOCAINE HYDROCHLORIDE 10 MG/ML
4 INJECTION, SOLUTION EPIDURAL; INFILTRATION; INTRACAUDAL; PERINEURAL ONCE
Status: COMPLETED | OUTPATIENT
Start: 2024-12-13 | End: 2024-12-13

## 2024-12-13 RX ADMIN — IOPAMIDOL 3 ML: 612 INJECTION, SOLUTION INTRAVENOUS at 13:56

## 2024-12-13 RX ADMIN — METHYLPREDNISOLONE ACETATE 80 MG: 80 INJECTION, SUSPENSION INTRA-ARTICULAR; INTRALESIONAL; INTRAMUSCULAR; SOFT TISSUE at 13:53

## 2024-12-13 RX ADMIN — METHYLPREDNISOLONE ACETATE 80 MG: 80 INJECTION, SUSPENSION INTRA-ARTICULAR; INTRALESIONAL; INTRAMUSCULAR; SOFT TISSUE at 13:52

## 2024-12-13 RX ADMIN — LIDOCAINE HYDROCHLORIDE 4 ML: 10 INJECTION, SOLUTION EPIDURAL; INFILTRATION; INTRACAUDAL; PERINEURAL at 13:53

## 2024-12-13 RX ADMIN — BUPIVACAINE HYDROCHLORIDE 4 ML: 5 INJECTION, SOLUTION EPIDURAL; INTRACAUDAL; PERINEURAL at 13:52

## 2024-12-13 RX ADMIN — BUPIVACAINE HYDROCHLORIDE 4 ML: 5 INJECTION, SOLUTION EPIDURAL; INTRACAUDAL; PERINEURAL at 13:53

## 2024-12-13 NOTE — OR NURSING
Patient brought to the IR suite via cart, after consent obtained, patient placed on the procedure table.  Patient then positioned/prepped/draped.

## 2024-12-13 NOTE — BRIEF OP NOTE
Brief Postoperative Note for Hip Injection    Cici Lanier  YOB: 1955  [id number]    Pre-operative Diagnosis:  Bilateral Hip Pain     Post-operative Diagnosis: Same    Procedure: Fluoroscopy Guided Hip Injection     Anesthesia: 1% Lidocaine    Contrast: Isovue 300     Surgeons/Assistants: Dr. Villafana and Genia Barnett PA-C    Complications: none    EBL: Minimal    Specimens: Were not obtained    Successful bilateral hip steroid mixture injection.  Dressings applied. Patient tolerated procedure well.    Electronically signed by Genia Barnett PA-C on 12/13/2024 at 2:07 PM

## 2025-02-03 ENCOUNTER — HOSPITAL ENCOUNTER (OUTPATIENT)
Dept: GENERAL RADIOLOGY | Age: 70
Discharge: HOME | End: 2025-02-05
Payer: MEDICARE

## 2025-02-03 ENCOUNTER — HOSPITAL ENCOUNTER (OUTPATIENT)
Age: 70
Discharge: HOME | End: 2025-02-05
Payer: MEDICARE

## 2025-02-03 ENCOUNTER — HOSPITAL ENCOUNTER (OUTPATIENT)
Age: 70
Discharge: HOME | End: 2025-02-03
Payer: MEDICARE

## 2025-02-03 DIAGNOSIS — R06.02 SHORTNESS OF BREATH: ICD-10-CM

## 2025-02-03 LAB
ALBUMIN SERPL-MCNC: 3.7 G/DL (ref 3.5–5.2)
ALBUMIN/GLOB SERPL: 1.5 {RATIO} (ref 1–2.5)
ALP SERPL-CCNC: 318 U/L (ref 35–104)
ALT SERPL-CCNC: 19 U/L (ref 10–35)
ANION GAP SERPL CALCULATED.3IONS-SCNC: 11 MMOL/L (ref 9–16)
AST SERPL-CCNC: 24 U/L (ref 10–35)
BILIRUB SERPL-MCNC: 0.5 MG/DL (ref 0–1.2)
BUN SERPL-MCNC: 29 MG/DL (ref 8–23)
CALCIUM SERPL-MCNC: 8.7 MG/DL (ref 8.6–10.4)
CHLORIDE SERPL-SCNC: 117 MMOL/L (ref 98–107)
CO2 SERPL-SCNC: 16 MMOL/L (ref 20–31)
CREAT SERPL-MCNC: 1.8 MG/DL (ref 0.6–0.9)
ERYTHROCYTE [DISTWIDTH] IN BLOOD BY AUTOMATED COUNT: 15.5 % (ref 11.8–14.4)
GFR, ESTIMATED: 30 ML/MIN/1.73M2
GLUCOSE SERPL-MCNC: 97 MG/DL (ref 74–99)
HCT VFR BLD AUTO: 37.8 % (ref 36.3–47.1)
HGB BLD-MCNC: 10.6 G/DL (ref 11.9–15.1)
MCH RBC QN AUTO: 28 PG (ref 25.2–33.5)
MCHC RBC AUTO-ENTMCNC: 28 G/DL (ref 28.4–34.8)
MCV RBC AUTO: 100 FL (ref 82.6–102.9)
NRBC BLD-RTO: 0 PER 100 WBC
PLATELET # BLD AUTO: 218 K/UL (ref 138–453)
PMV BLD AUTO: 10.6 FL (ref 8.1–13.5)
POTASSIUM SERPL-SCNC: 4.1 MMOL/L (ref 3.7–5.3)
PROT SERPL-MCNC: 6.1 G/DL (ref 6.6–8.7)
RBC # BLD AUTO: 3.78 M/UL (ref 3.95–5.11)
SODIUM SERPL-SCNC: 144 MMOL/L (ref 136–145)
WBC OTHER # BLD: 5.8 K/UL (ref 3.5–11.3)

## 2025-02-03 PROCEDURE — 85027 COMPLETE CBC AUTOMATED: CPT

## 2025-02-03 PROCEDURE — 71046 X-RAY EXAM CHEST 2 VIEWS: CPT

## 2025-02-03 PROCEDURE — 80053 COMPREHEN METABOLIC PANEL: CPT

## 2025-02-03 PROCEDURE — 36415 COLL VENOUS BLD VENIPUNCTURE: CPT

## 2025-05-07 ENCOUNTER — OFFICE VISIT (OUTPATIENT)
Dept: ORTHOPEDIC SURGERY | Age: 70
End: 2025-05-07
Payer: MEDICARE

## 2025-05-07 ENCOUNTER — TELEPHONE (OUTPATIENT)
Dept: ORTHOPEDIC SURGERY | Age: 70
End: 2025-05-07

## 2025-05-07 VITALS — BODY MASS INDEX: 45.99 KG/M2 | WEIGHT: 293 LBS | RESPIRATION RATE: 18 BRPM | HEIGHT: 67 IN

## 2025-05-07 DIAGNOSIS — M25.561 PAIN IN BOTH KNEES, UNSPECIFIED CHRONICITY: ICD-10-CM

## 2025-05-07 DIAGNOSIS — M25.551 BILATERAL HIP PAIN: Primary | ICD-10-CM

## 2025-05-07 DIAGNOSIS — M25.551 PAIN OF RIGHT HIP: ICD-10-CM

## 2025-05-07 DIAGNOSIS — M25.552 BILATERAL HIP PAIN: Primary | ICD-10-CM

## 2025-05-07 DIAGNOSIS — M25.562 PAIN IN BOTH KNEES, UNSPECIFIED CHRONICITY: ICD-10-CM

## 2025-05-07 PROCEDURE — 99213 OFFICE O/P EST LOW 20 MIN: CPT | Performed by: ORTHOPAEDIC SURGERY

## 2025-05-07 PROCEDURE — 1090F PRES/ABSN URINE INCON ASSESS: CPT | Performed by: ORTHOPAEDIC SURGERY

## 2025-05-07 PROCEDURE — 1159F MED LIST DOCD IN RCRD: CPT | Performed by: ORTHOPAEDIC SURGERY

## 2025-05-07 PROCEDURE — G8427 DOCREV CUR MEDS BY ELIG CLIN: HCPCS | Performed by: ORTHOPAEDIC SURGERY

## 2025-05-07 PROCEDURE — 1036F TOBACCO NON-USER: CPT | Performed by: ORTHOPAEDIC SURGERY

## 2025-05-07 PROCEDURE — G8400 PT W/DXA NO RESULTS DOC: HCPCS | Performed by: ORTHOPAEDIC SURGERY

## 2025-05-07 PROCEDURE — G8417 CALC BMI ABV UP PARAM F/U: HCPCS | Performed by: ORTHOPAEDIC SURGERY

## 2025-05-07 PROCEDURE — 1125F AMNT PAIN NOTED PAIN PRSNT: CPT | Performed by: ORTHOPAEDIC SURGERY

## 2025-05-07 PROCEDURE — 1123F ACP DISCUSS/DSCN MKR DOCD: CPT | Performed by: ORTHOPAEDIC SURGERY

## 2025-05-07 PROCEDURE — 3017F COLORECTAL CA SCREEN DOC REV: CPT | Performed by: ORTHOPAEDIC SURGERY

## 2025-05-07 NOTE — PROGRESS NOTES
[facility]                   Kamari White M.D.            [address], [address]               [place], [place] [address]           Dept Phone: [phone]           Dept Fax:  [phone] [address] [facility]                       [address]           [place], [place] [address]          Dept Phone: [phone]           Dept Fax:  [phone]      Chief Compliant:  Chief Complaint   Patient presents with    Pain     Bilateral knees & Rt hip        History of Present Illness:  This is a 69 y.o. female who presents to the clinic today for evaluation / follow up of severe bilateral hip pain.  Patient is well-known to us as she has had a previous bilateral total knee arthroplasties.  She had the perhaps the 2 worst knees that have seen in my career that she ended up having hinged components placed on both sides and did very well was ambulatory for quite some time.  She has however over the course of the past couple years to become less ambulatory as her hips have become severely arthritic.  She has been seen in pain management and they were doing SI joint injections.  Hip does appear that she has had an IR injection of her hips but this was quite a while ago unfortunately for the patient that she is not a surgical candidate for hip arthroplasty as her BMI is at 62.65 and she is in excess of 400 pounds and significant and abundant overlying soft tissue.       Review of Systems   Constitutional: Negative for fever, chills, sweats.   Eyes: Negative for changes in vision, or pain.   HENT: Negative for ear ache, epistaxis, or sore throat.  Respiratory/Cardio: Negative for Chest pain, palpitations, SOB, or cough.  Gastrointestinal: Negative for abdominal pain, N/V/D.   Genitourinary: Negative for dysuria, frequency, urgency, or hematuria.   Neurological: Negative for headache, numbness, or weakness.   Integumentary: Negative for rash, itching, laceration, or abrasion.

## 2025-05-07 NOTE — TELEPHONE ENCOUNTER
Gladys with interventional radiology called stating orders R ARTHR/ASP/INJ MAJOR JT/BURSA LEFT WO US [[id number]] (Order [id number]) IR ARTHR/ASP/INJ MAJOR JT/BURSA RIGHT WO US [[id number]]   need medications listed  Please update  Thank you

## 2025-05-08 ENCOUNTER — TELEPHONE (OUTPATIENT)
Dept: INTERVENTIONAL RADIOLOGY/VASCULAR | Age: 70
End: 2025-05-08

## 2025-05-08 NOTE — TELEPHONE ENCOUNTER
Corrected the orders in the office visit to include the medications to be used for the IR injections of the hips.

## 2025-05-20 ENCOUNTER — HOSPITAL ENCOUNTER (OUTPATIENT)
Dept: INTERVENTIONAL RADIOLOGY/VASCULAR | Age: 70
Discharge: HOME | End: 2025-05-22
Attending: ORTHOPAEDIC SURGERY
Payer: MEDICARE

## 2025-05-20 DIAGNOSIS — M25.551 BILATERAL HIP PAIN: ICD-10-CM

## 2025-05-20 DIAGNOSIS — M25.552 BILATERAL HIP PAIN: ICD-10-CM

## 2025-05-20 PROCEDURE — 6360000004 HC RX CONTRAST MEDICATION: Performed by: RADIOLOGY

## 2025-05-20 PROCEDURE — 20610 DRAIN/INJ JOINT/BURSA W/O US: CPT

## 2025-05-20 PROCEDURE — 6360000002 HC RX W HCPCS: Performed by: ORTHOPAEDIC SURGERY

## 2025-05-20 PROCEDURE — 77002 NEEDLE LOCALIZATION BY XRAY: CPT

## 2025-05-20 PROCEDURE — 2709999900 IR ARTHR/ASP/INJ MAJOR JT/BURSA LEFT WO US

## 2025-05-20 RX ORDER — LIDOCAINE HYDROCHLORIDE 10 MG/ML
4 INJECTION, SOLUTION EPIDURAL; INFILTRATION; INTRACAUDAL; PERINEURAL ONCE
Status: DISCONTINUED | OUTPATIENT
Start: 2025-05-20 | End: 2025-05-20 | Stop reason: SDUPTHER

## 2025-05-20 RX ORDER — IOPAMIDOL 612 MG/ML
2 INJECTION, SOLUTION INTRAVASCULAR ONCE
Status: COMPLETED | OUTPATIENT
Start: 2025-05-20 | End: 2025-05-20

## 2025-05-20 RX ORDER — BUPIVACAINE HYDROCHLORIDE 5 MG/ML
4 INJECTION, SOLUTION EPIDURAL; INTRACAUDAL ONCE
Status: COMPLETED | OUTPATIENT
Start: 2025-05-20 | End: 2025-05-20

## 2025-05-20 RX ORDER — BUPIVACAINE HYDROCHLORIDE 5 MG/ML
4 INJECTION, SOLUTION PERINEURAL ONCE
Status: DISCONTINUED | OUTPATIENT
Start: 2025-05-20 | End: 2025-05-20 | Stop reason: SDUPTHER

## 2025-05-20 RX ORDER — LIDOCAINE HYDROCHLORIDE 10 MG/ML
4 INJECTION, SOLUTION EPIDURAL; INFILTRATION; INTRACAUDAL; PERINEURAL ONCE
Status: COMPLETED | OUTPATIENT
Start: 2025-05-20 | End: 2025-05-20

## 2025-05-20 RX ORDER — METHYLPREDNISOLONE ACETATE 80 MG/ML
80 INJECTION, SUSPENSION INTRA-ARTICULAR; INTRALESIONAL; INTRAMUSCULAR; SOFT TISSUE ONCE
Status: COMPLETED | OUTPATIENT
Start: 2025-05-20 | End: 2025-05-20

## 2025-05-20 RX ORDER — METHYLPREDNISOLONE ACETATE 80 MG/ML
80 INJECTION, SUSPENSION INTRA-ARTICULAR; INTRALESIONAL; INTRAMUSCULAR; SOFT TISSUE ONCE
Status: DISCONTINUED | OUTPATIENT
Start: 2025-05-20 | End: 2025-05-20 | Stop reason: SDUPTHER

## 2025-05-20 RX ADMIN — LIDOCAINE HYDROCHLORIDE 4 ML: 10 INJECTION, SOLUTION EPIDURAL; INFILTRATION; INTRACAUDAL; PERINEURAL at 14:42

## 2025-05-20 RX ADMIN — METHYLPREDNISOLONE ACETATE 80 MG: 80 INJECTION, SUSPENSION INTRA-ARTICULAR; INTRALESIONAL; INTRAMUSCULAR; SOFT TISSUE at 14:39

## 2025-05-20 RX ADMIN — METHYLPREDNISOLONE ACETATE 80 MG: 80 INJECTION, SUSPENSION INTRA-ARTICULAR; INTRALESIONAL; INTRAMUSCULAR; SOFT TISSUE at 14:41

## 2025-05-20 RX ADMIN — IOPAMIDOL 2 ML: 612 INJECTION, SOLUTION INTRAVENOUS at 14:37

## 2025-05-20 RX ADMIN — BUPIVACAINE HYDROCHLORIDE 20 MG: 5 INJECTION, SOLUTION EPIDURAL; INTRACAUDAL; PERINEURAL at 14:35

## 2025-05-20 RX ADMIN — BUPIVACAINE HYDROCHLORIDE 20 MG: 5 INJECTION, SOLUTION PERINEURAL at 14:40

## 2025-05-20 RX ADMIN — IOPAMIDOL 2 ML: 612 INJECTION, SOLUTION INTRAVENOUS at 14:44

## 2025-05-20 ASSESSMENT — PAIN SCALES - GENERAL
PAINLEVEL_OUTOF10: 8
PAINLEVEL_OUTOF10: 8

## 2025-05-20 ASSESSMENT — PAIN DESCRIPTION - LOCATION: LOCATION: HIP

## 2025-05-20 ASSESSMENT — PAIN DESCRIPTION - ORIENTATION: ORIENTATION: RIGHT

## 2025-05-20 ASSESSMENT — PAIN DESCRIPTION - DESCRIPTORS: DESCRIPTORS: ACHING;THROBBING;SHARP

## 2025-07-01 ENCOUNTER — TELEPHONE (OUTPATIENT)
Dept: ORTHOPEDIC SURGERY | Age: 70
End: 2025-07-01

## 2025-07-01 DIAGNOSIS — M25.552 BILATERAL HIP PAIN: Primary | ICD-10-CM

## 2025-07-01 DIAGNOSIS — M25.551 PAIN OF RIGHT HIP: ICD-10-CM

## 2025-07-01 DIAGNOSIS — M25.551 BILATERAL HIP PAIN: Primary | ICD-10-CM

## 2025-07-01 DIAGNOSIS — M25.552 LEFT HIP PAIN: ICD-10-CM

## 2025-07-01 NOTE — TELEPHONE ENCOUNTER
Pain management order placed. Phone call to pt but had to leave a message for her to call to be informed it was ordered.

## 2025-07-01 NOTE — TELEPHONE ENCOUNTER
Dr. White,   Patient's IR injection is wearing off, and she is asking if you have any other recommendations. Please see attached note, and please advise.

## 2025-07-01 NOTE — TELEPHONE ENCOUNTER
----- Message from Joshua CORTEZ sent at 6/30/2025 11:15 AM EDT -----  Regarding: Specialty Message to Provider  Specialty Message to Provider    Relationship to Patient: Self     Patient Message: Pt had an injection done 5/7/2025 in rt hip, the pain relief has started to wear off and pt wanted  recommendations. She wants to know if she should schedule with Pain Management.   --------------------------------------------------------------------------------------------------------------------------    Call Back Information: OK to leave message on voicemail  Preferred Call Back Number: [phone]

## 2025-07-23 ENCOUNTER — INITIAL CONSULT (OUTPATIENT)
Dept: PAIN MANAGEMENT | Age: 70
End: 2025-07-23
Payer: MEDICARE

## 2025-07-23 VITALS — HEIGHT: 67 IN | BODY MASS INDEX: 45.99 KG/M2 | WEIGHT: 293 LBS

## 2025-07-23 DIAGNOSIS — M46.1 SACROILIITIS: ICD-10-CM

## 2025-07-23 DIAGNOSIS — M25.552 BILATERAL HIP PAIN: Primary | ICD-10-CM

## 2025-07-23 DIAGNOSIS — M25.551 BILATERAL HIP PAIN: Primary | ICD-10-CM

## 2025-07-23 DIAGNOSIS — M47.817 LUMBOSACRAL SPONDYLOSIS WITHOUT MYELOPATHY: ICD-10-CM

## 2025-07-23 PROCEDURE — 99204 OFFICE O/P NEW MOD 45 MIN: CPT | Performed by: PAIN MEDICINE

## 2025-07-23 PROCEDURE — 1159F MED LIST DOCD IN RCRD: CPT | Performed by: PAIN MEDICINE

## 2025-07-23 PROCEDURE — G8427 DOCREV CUR MEDS BY ELIG CLIN: HCPCS | Performed by: PAIN MEDICINE

## 2025-07-23 PROCEDURE — G8400 PT W/DXA NO RESULTS DOC: HCPCS | Performed by: PAIN MEDICINE

## 2025-07-23 PROCEDURE — 1123F ACP DISCUSS/DSCN MKR DOCD: CPT | Performed by: PAIN MEDICINE

## 2025-07-23 PROCEDURE — 3017F COLORECTAL CA SCREEN DOC REV: CPT | Performed by: PAIN MEDICINE

## 2025-07-23 PROCEDURE — G8417 CALC BMI ABV UP PARAM F/U: HCPCS | Performed by: PAIN MEDICINE

## 2025-07-23 PROCEDURE — 1036F TOBACCO NON-USER: CPT | Performed by: PAIN MEDICINE

## 2025-07-23 PROCEDURE — 1090F PRES/ABSN URINE INCON ASSESS: CPT | Performed by: PAIN MEDICINE

## 2025-07-23 RX ORDER — FLUTICASONE PROPIONATE 50 MCG
SPRAY, SUSPENSION (ML) NASAL
COMMUNITY

## 2025-07-23 RX ORDER — ATORVASTATIN CALCIUM 40 MG/1
40 TABLET, FILM COATED ORAL NIGHTLY
COMMUNITY
Start: 2024-10-24

## 2025-07-23 RX ORDER — AMLODIPINE AND BENAZEPRIL HYDROCHLORIDE 10; 20 MG/1; MG/1
1 CAPSULE ORAL
COMMUNITY

## 2025-07-23 RX ORDER — HEPARIN SODIUM 1000 [USP'U]/ML
5000 INJECTION, SOLUTION INTRAVENOUS; SUBCUTANEOUS ONCE
COMMUNITY

## 2025-07-23 RX ORDER — ACETAMINOPHEN 325 MG/1
TABLET ORAL
COMMUNITY

## 2025-07-23 RX ORDER — LEVOFLOXACIN 250 MG/1
TABLET, FILM COATED ORAL
COMMUNITY
Start: 2025-03-25

## 2025-07-23 RX ORDER — BUDESONIDE AND FORMOTEROL FUMARATE DIHYDRATE 160; 4.5 UG/1; UG/1
AEROSOL RESPIRATORY (INHALATION)
COMMUNITY
Start: 2025-01-23

## 2025-07-23 RX ORDER — SENNOSIDES 8.6 MG
1300 CAPSULE ORAL EVERY 8 HOURS PRN
COMMUNITY

## 2025-07-23 RX ORDER — OXYCODONE HYDROCHLORIDE 5 MG/1
TABLET ORAL
COMMUNITY
Start: 2024-10-15

## 2025-07-23 RX ORDER — ACETAMINOPHEN 500 MG
500 TABLET ORAL EVERY 6 HOURS PRN
COMMUNITY

## 2025-07-23 RX ORDER — AZITHROMYCIN 250 MG/1
TABLET, FILM COATED ORAL
COMMUNITY
Start: 2025-05-15

## 2025-07-23 RX ORDER — BUPROPION HYDROCHLORIDE 150 MG/1
TABLET, EXTENDED RELEASE ORAL
COMMUNITY
Start: 2025-05-11

## 2025-07-23 RX ORDER — MISOPROSTOL 200 UG/1
800 TABLET ORAL
COMMUNITY

## 2025-07-23 RX ORDER — GABAPENTIN 300 MG/1
300 CAPSULE ORAL DAILY
COMMUNITY

## 2025-07-23 RX ORDER — ATORVASTATIN CALCIUM 40 MG/1
40 TABLET, FILM COATED ORAL
COMMUNITY

## 2025-07-23 RX ORDER — MAGNESIUM OXIDE 400 MG/1
TABLET ORAL
COMMUNITY

## 2025-07-23 RX ORDER — ONDANSETRON 4 MG/1
TABLET, FILM COATED ORAL
COMMUNITY
Start: 2025-02-26

## 2025-07-23 RX ORDER — ACETAZOLAMIDE 250 MG/1
TABLET ORAL
COMMUNITY
Start: 2024-10-25

## 2025-07-23 RX ORDER — METHYLPREDNISOLONE 4 MG/1
TABLET ORAL
COMMUNITY
Start: 2025-05-15

## 2025-07-23 RX ORDER — BUMETANIDE 2 MG/1
TABLET ORAL
COMMUNITY

## 2025-07-23 ASSESSMENT — ENCOUNTER SYMPTOMS: BACK PAIN: 1

## 2025-07-23 NOTE — PROGRESS NOTES
Heart Failure Father        Social History     Socioeconomic History    Marital status:      Spouse name: Not on file    Number of children: Not on file    Years of education: Not on file    Highest education level: Not on file   Occupational History    Not on file   Tobacco Use    Smoking status: Former     Current packs/day: 0.00     Average packs/day: 0.5 packs/day for 36.0 years (18.0 ttl pk-yrs)     Types: Cigarettes     Start date: 1976     Quit date: 2012     Years since quittin.4    Smokeless tobacco: Never   Vaping Use    Vaping status: Never Used   Substance and Sexual Activity    Alcohol use: No    Drug use: No    Sexual activity: Not on file   Other Topics Concern    Not on file   Social History Narrative    Not on file     Social Drivers of Health     Financial Resource Strain: Medium Risk (10/16/2024)    Received from [facility]    Overall Financial Resource Strain (CARDIA)     Difficulty of Paying Living Expenses: Somewhat hard   Food Insecurity: No Food Insecurity (10/16/2024)    Received from [facility]    Hunger Vital Sign     Within the past 12 months, you worried that your food would run out before you got the money to buy more.: Never true     Ran Out of Food in the Last Year: Not on file   Transportation Needs: Unmet Transportation Needs (10/16/2024)    Received from [facility]    Transportation     In the past 12 months, has lack of transportation kept you from medical appointments or from getting medications?: Yes     Lack of Transportation (Non-Medical): Not on file   Physical Activity: Not on file   Stress: Stress Concern Present (2024)    [facility] from [facility]    [employer] [place] [employer] - Occupational Stress Questionnaire     Feeling of Stress : Rather much   Social Connections: Moderately Isolated (2024)    Received from [facility]

## 2025-09-04 ENCOUNTER — TELEPHONE (OUTPATIENT)
Dept: ORTHOPEDIC SURGERY | Age: 70
End: 2025-09-04

## 2025-09-04 DIAGNOSIS — M25.552 BILATERAL HIP PAIN: Primary | ICD-10-CM

## 2025-09-04 DIAGNOSIS — M16.0 OSTEOARTHRITIS OF BOTH HIPS, UNSPECIFIED OSTEOARTHRITIS TYPE: ICD-10-CM

## 2025-09-04 DIAGNOSIS — M25.551 BILATERAL HIP PAIN: Primary | ICD-10-CM

## (undated) DEVICE — SOLUTION IV IRRIG POUR BRL 0.9% SODIUM CHL 2F7124

## (undated) DEVICE — STERILE PATIENT PROTECTIVE PAD FOR IMP® KNEE POSITIONERS & COHESIVE WRAP (10 / CASE): Brand: DE MAYO KNEE POSITIONER®

## (undated) DEVICE — SUTURE STRATAFIX SYMMETRIC PDS + SZ 1 L18IN ABSRB VLT L48MM SXPP1A400

## (undated) DEVICE — SOLUTION IV IRRIG WATER 1000ML POUR BRL 2F7114

## (undated) DEVICE — STRAP POS MP 30X3 IN HK LOOP CLOSURE FOAM DISP

## (undated) DEVICE — SUTURE FIBERWIRE SZ 5 L38IN NONABSORBABLE BLU L48MM 1/2 AR7211

## (undated) DEVICE — Z DUP USE 2428662 DRESSING POSTOP AG PRISMASEAL 3.5X14IN

## (undated) DEVICE — 3M™ WARMING BLANKET, UPPER BODY, 10 PER CASE, 42268: Brand: BAIR HUGGER™

## (undated) DEVICE — 2108 SERIES SAGITTAL BLADE FLARED, GROUND  (29.0 X 1.32 X 84.0MM)

## (undated) DEVICE — MASTISOL ADHESIVE LIQ 2/3ML

## (undated) DEVICE — DUAL CUT SAGITTAL BLADE

## (undated) DEVICE — DRESSING GZ W3XL16IN CELOS ACETT OIL EMUL N ADH

## (undated) DEVICE — SUTURE VCRL SZ 0 L36IN ABSRB UD CT-1 L36MM 1/2 CIR TAPR PNT VCP946H

## (undated) DEVICE — Device

## (undated) DEVICE — BANDAGE,SELF ADHRNT,COFLEX,4"X5YD,STRL: Brand: COLABEL

## (undated) DEVICE — Z INACTIVE USE 2660664 SOLUTION IRRIG 3000ML 0.9% SOD CHL USP UROMATIC PLAS CONT

## (undated) DEVICE — 4-PORT MANIFOLD: Brand: NEPTUNE 2

## (undated) DEVICE — COVER,TABLE,HEAVY DUTY,50"X90",STRL: Brand: MEDLINE

## (undated) DEVICE — SUTURE STRATAFIX SPRL MCRYL + SZ 2 0 L27IN ABSRB UD W NDL SXMP1B419

## (undated) DEVICE — COOLER THER 20-31IN L CRYO COMB W/ PD KNEE TB GRAV FLO

## (undated) DEVICE — KIT AUTOTRNS APPL AERO 2 SET SYR 2 TIP FOR PLT SEP SYS GPS

## (undated) DEVICE — STRAP,POSITIONING,KNEE/BODY,FOAM,4X60": Brand: MEDLINE

## (undated) DEVICE — BLANKET WRM W29.9XL79.1IN UP BODY FORC AIR MISTRAL-AIR

## (undated) DEVICE — INTENDED TO SUPPORT AND MAINTAIN THE POSITION OF AN ANESTHETIZED PATIENT DURING SURGERY: Brand: HERMANTOR XL PINK KNEE POSITIONING PAD

## (undated) DEVICE — GLOVE SURG SZ 85 STD WHT LTX SYN POLYMER BEAD REINF ANTI RL

## (undated) DEVICE — COVER,MAYO STAND,XL,STERILE: Brand: MEDLINE

## (undated) DEVICE — GLOVE SURG SZ 8 L12IN FNGR THK13MIL BRN LTX SYN POLYMER W

## (undated) DEVICE — SET HNDPC W COAX BNE CLN TIP SUCT TB BTTRY PWR DISPOSABLE

## (undated) DEVICE — E-Z CLEAN, NON-STICK, PTFE COATED, ELECTROSURGICAL BLADE ELECTRODE, 2.75 INCH (7 CM): Brand: MEGADYNE

## (undated) DEVICE — KIT SEP W/ BLD DRAW TB SYR NDL TRNQT PD

## (undated) DEVICE — DRAPE,REIN 53X77,STERILE: Brand: MEDLINE

## (undated) DEVICE — 450 ML BOTTLE OF 0.05% CHLORHEXIDINE GLUCONATE IN 99.95% STERILE WATER FOR IRRIGATION, USP AND APPLICATOR.: Brand: IRRISEPT ANTIMICROBIAL WOUND LAVAGE

## (undated) DEVICE — CEMENT MIXING SYSTEM WITH FEMORAL BREAKWAY NOZZLE: Brand: REVOLUTION

## (undated) DEVICE — ELECTRODE ES L3IN S STL BLDE INSUL DISP VALLEYLAB EDGE

## (undated) DEVICE — GLOVE ORTHO 8   MSG9480

## (undated) DEVICE — CLOSURE SKIN FLX NONINVASIVE PRELOC TECHNOLOGY FOR 24IN

## (undated) DEVICE — GOWN,SIRUS,NONRNF,SETINSLV,XL,20/CS: Brand: MEDLINE

## (undated) DEVICE — CHLORAPREP 26ML ORANGE